# Patient Record
Sex: MALE | Race: ASIAN | NOT HISPANIC OR LATINO
[De-identification: names, ages, dates, MRNs, and addresses within clinical notes are randomized per-mention and may not be internally consistent; named-entity substitution may affect disease eponyms.]

---

## 2017-01-03 ENCOUNTER — RX RENEWAL (OUTPATIENT)
Age: 77
End: 2017-01-03

## 2017-01-26 ENCOUNTER — RX RENEWAL (OUTPATIENT)
Age: 77
End: 2017-01-26

## 2017-04-04 ENCOUNTER — MEDICATION RENEWAL (OUTPATIENT)
Age: 77
End: 2017-04-04

## 2017-04-05 ENCOUNTER — RX RENEWAL (OUTPATIENT)
Age: 77
End: 2017-04-05

## 2017-04-06 ENCOUNTER — MEDICATION RENEWAL (OUTPATIENT)
Age: 77
End: 2017-04-06

## 2017-05-11 ENCOUNTER — RX RENEWAL (OUTPATIENT)
Age: 77
End: 2017-05-11

## 2017-06-28 ENCOUNTER — RX RENEWAL (OUTPATIENT)
Age: 77
End: 2017-06-28

## 2017-06-29 ENCOUNTER — MEDICATION RENEWAL (OUTPATIENT)
Age: 77
End: 2017-06-29

## 2017-07-20 ENCOUNTER — NON-APPOINTMENT (OUTPATIENT)
Age: 77
End: 2017-07-20

## 2017-07-20 ENCOUNTER — APPOINTMENT (OUTPATIENT)
Dept: CARDIOLOGY | Facility: CLINIC | Age: 77
End: 2017-07-20

## 2017-07-20 VITALS
SYSTOLIC BLOOD PRESSURE: 124 MMHG | HEART RATE: 62 BPM | OXYGEN SATURATION: 96 % | HEIGHT: 68 IN | BODY MASS INDEX: 26.52 KG/M2 | WEIGHT: 175 LBS | DIASTOLIC BLOOD PRESSURE: 70 MMHG

## 2017-07-25 ENCOUNTER — MEDICATION RENEWAL (OUTPATIENT)
Age: 77
End: 2017-07-25

## 2017-08-03 ENCOUNTER — MEDICATION RENEWAL (OUTPATIENT)
Age: 77
End: 2017-08-03

## 2017-08-04 ENCOUNTER — RX RENEWAL (OUTPATIENT)
Age: 77
End: 2017-08-04

## 2017-09-25 ENCOUNTER — RX RENEWAL (OUTPATIENT)
Age: 77
End: 2017-09-25

## 2017-10-02 ENCOUNTER — RX RENEWAL (OUTPATIENT)
Age: 77
End: 2017-10-02

## 2017-10-09 ENCOUNTER — RX RENEWAL (OUTPATIENT)
Age: 77
End: 2017-10-09

## 2017-10-10 ENCOUNTER — MEDICATION RENEWAL (OUTPATIENT)
Age: 77
End: 2017-10-10

## 2017-10-20 ENCOUNTER — RX RENEWAL (OUTPATIENT)
Age: 77
End: 2017-10-20

## 2017-10-23 ENCOUNTER — MEDICATION RENEWAL (OUTPATIENT)
Age: 77
End: 2017-10-23

## 2017-12-11 ENCOUNTER — RX RENEWAL (OUTPATIENT)
Age: 77
End: 2017-12-11

## 2017-12-28 ENCOUNTER — RX RENEWAL (OUTPATIENT)
Age: 77
End: 2017-12-28

## 2018-01-02 ENCOUNTER — RX RENEWAL (OUTPATIENT)
Age: 78
End: 2018-01-02

## 2018-01-15 ENCOUNTER — MEDICATION RENEWAL (OUTPATIENT)
Age: 78
End: 2018-01-15

## 2018-01-15 ENCOUNTER — RX RENEWAL (OUTPATIENT)
Age: 78
End: 2018-01-15

## 2018-03-12 ENCOUNTER — RX RENEWAL (OUTPATIENT)
Age: 78
End: 2018-03-12

## 2018-03-15 ENCOUNTER — RX RENEWAL (OUTPATIENT)
Age: 78
End: 2018-03-15

## 2018-03-15 ENCOUNTER — MEDICATION RENEWAL (OUTPATIENT)
Age: 78
End: 2018-03-15

## 2018-04-02 ENCOUNTER — RX RENEWAL (OUTPATIENT)
Age: 78
End: 2018-04-02

## 2018-04-03 ENCOUNTER — MEDICATION RENEWAL (OUTPATIENT)
Age: 78
End: 2018-04-03

## 2018-04-03 ENCOUNTER — RX RENEWAL (OUTPATIENT)
Age: 78
End: 2018-04-03

## 2018-04-04 ENCOUNTER — MEDICATION RENEWAL (OUTPATIENT)
Age: 78
End: 2018-04-04

## 2018-04-04 ENCOUNTER — RX RENEWAL (OUTPATIENT)
Age: 78
End: 2018-04-04

## 2018-04-05 ENCOUNTER — NON-APPOINTMENT (OUTPATIENT)
Age: 78
End: 2018-04-05

## 2018-04-05 ENCOUNTER — APPOINTMENT (OUTPATIENT)
Dept: CARDIOLOGY | Facility: CLINIC | Age: 78
End: 2018-04-05
Payer: MEDICARE

## 2018-04-05 VITALS — HEIGHT: 68 IN | BODY MASS INDEX: 26.83 KG/M2 | WEIGHT: 177 LBS

## 2018-04-05 VITALS — SYSTOLIC BLOOD PRESSURE: 138 MMHG | OXYGEN SATURATION: 96 % | DIASTOLIC BLOOD PRESSURE: 76 MMHG | HEART RATE: 66 BPM

## 2018-04-05 PROCEDURE — 99214 OFFICE O/P EST MOD 30 MIN: CPT | Mod: 25

## 2018-04-05 PROCEDURE — 93000 ELECTROCARDIOGRAM COMPLETE: CPT

## 2018-04-05 RX ORDER — SIMVASTATIN 10 MG/1
10 TABLET, FILM COATED ORAL
Qty: 90 | Refills: 0 | Status: DISCONTINUED | COMMUNITY
End: 2018-04-05

## 2018-04-09 ENCOUNTER — RX RENEWAL (OUTPATIENT)
Age: 78
End: 2018-04-09

## 2018-05-01 ENCOUNTER — RX RENEWAL (OUTPATIENT)
Age: 78
End: 2018-05-01

## 2018-05-25 ENCOUNTER — APPOINTMENT (OUTPATIENT)
Dept: CARDIOLOGY | Facility: CLINIC | Age: 78
End: 2018-05-25

## 2018-06-07 ENCOUNTER — RX RENEWAL (OUTPATIENT)
Age: 78
End: 2018-06-07

## 2018-06-11 ENCOUNTER — MEDICATION RENEWAL (OUTPATIENT)
Age: 78
End: 2018-06-11

## 2018-06-11 ENCOUNTER — RX RENEWAL (OUTPATIENT)
Age: 78
End: 2018-06-11

## 2018-06-29 ENCOUNTER — RX RENEWAL (OUTPATIENT)
Age: 78
End: 2018-06-29

## 2018-08-02 ENCOUNTER — APPOINTMENT (OUTPATIENT)
Dept: CARDIOLOGY | Facility: CLINIC | Age: 78
End: 2018-08-02

## 2018-10-01 ENCOUNTER — RX RENEWAL (OUTPATIENT)
Age: 78
End: 2018-10-01

## 2018-10-01 ENCOUNTER — MEDICATION RENEWAL (OUTPATIENT)
Age: 78
End: 2018-10-01

## 2018-10-11 ENCOUNTER — APPOINTMENT (OUTPATIENT)
Dept: CARDIOLOGY | Facility: CLINIC | Age: 78
End: 2018-10-11
Payer: MEDICARE

## 2018-10-11 ENCOUNTER — NON-APPOINTMENT (OUTPATIENT)
Age: 78
End: 2018-10-11

## 2018-10-11 VITALS — WEIGHT: 176 LBS | HEIGHT: 68 IN | BODY MASS INDEX: 26.67 KG/M2

## 2018-10-11 VITALS — SYSTOLIC BLOOD PRESSURE: 113 MMHG | DIASTOLIC BLOOD PRESSURE: 62 MMHG | OXYGEN SATURATION: 98 % | HEART RATE: 62 BPM

## 2018-10-11 DIAGNOSIS — K62.5 HEMORRHAGE OF ANUS AND RECTUM: ICD-10-CM

## 2018-10-11 PROCEDURE — 93000 ELECTROCARDIOGRAM COMPLETE: CPT

## 2018-10-11 PROCEDURE — 90686 IIV4 VACC NO PRSV 0.5 ML IM: CPT

## 2018-10-11 PROCEDURE — G0008: CPT

## 2018-10-11 PROCEDURE — 99214 OFFICE O/P EST MOD 30 MIN: CPT | Mod: 25

## 2018-10-16 ENCOUNTER — MED ADMIN CHARGE (OUTPATIENT)
Age: 78
End: 2018-10-16

## 2018-10-30 ENCOUNTER — MEDICATION RENEWAL (OUTPATIENT)
Age: 78
End: 2018-10-30

## 2018-10-30 ENCOUNTER — RX RENEWAL (OUTPATIENT)
Age: 78
End: 2018-10-30

## 2018-11-16 ENCOUNTER — APPOINTMENT (OUTPATIENT)
Dept: CARDIOLOGY | Facility: CLINIC | Age: 78
End: 2018-11-16
Payer: MEDICARE

## 2018-11-16 PROCEDURE — 93880 EXTRACRANIAL BILAT STUDY: CPT

## 2018-11-27 ENCOUNTER — RX RENEWAL (OUTPATIENT)
Age: 78
End: 2018-11-27

## 2019-02-24 ENCOUNTER — RX RENEWAL (OUTPATIENT)
Age: 79
End: 2019-02-24

## 2019-02-28 ENCOUNTER — NON-APPOINTMENT (OUTPATIENT)
Age: 79
End: 2019-02-28

## 2019-02-28 ENCOUNTER — APPOINTMENT (OUTPATIENT)
Dept: CARDIOLOGY | Facility: CLINIC | Age: 79
End: 2019-02-28
Payer: MEDICARE

## 2019-02-28 VITALS
OXYGEN SATURATION: 93 % | SYSTOLIC BLOOD PRESSURE: 132 MMHG | HEART RATE: 61 BPM | DIASTOLIC BLOOD PRESSURE: 74 MMHG | BODY MASS INDEX: 26.37 KG/M2 | HEIGHT: 68 IN | WEIGHT: 174 LBS

## 2019-02-28 PROCEDURE — 93000 ELECTROCARDIOGRAM COMPLETE: CPT

## 2019-02-28 PROCEDURE — 99214 OFFICE O/P EST MOD 30 MIN: CPT | Mod: 25

## 2019-02-28 RX ORDER — SITAGLIPTIN AND METFORMIN HYDROCHLORIDE 50; 500 MG/1; MG/1
50-500 TABLET, FILM COATED ORAL TWICE DAILY
Qty: 180 | Refills: 1 | Status: DISCONTINUED | COMMUNITY
Start: 2018-10-19 | End: 2019-02-28

## 2019-02-28 NOTE — ASSESSMENT
[FreeTextEntry1] : Patient with above hx \par \par \par CAD  CABG Prior PCI  : stable , continue medication , continue his present medication . will obtain carotid doppler to rule out carotid stenosis .\par \par DM:elevated , uncontrolled , advised the patient to change metformin to 1000 mg po daily  add januvia 50 mg po BID  , glipizide to 5 mg twice   , home monitoring , will obtain blood work , recommended opthalmology , podiatric evaluation . \par \par HLD : continue low cholesterol diet ,change  medication due to side effect , crestor 5 mg po daily , \par \par HTN:controlled , continue metoprolol succinate , \par \par BPH , advised the patient to be evaluated by urologist ,\par

## 2019-02-28 NOTE — REVIEW OF SYSTEMS
[Fever] : no fever [Blurry Vision] : no blurred vision [Earache] : no earache [Shortness Of Breath] : no shortness of breath [Palpitations] : no palpitations [Cough] : no cough [Abdominal Pain] : no abdominal pain [Heartburn] : no heartburn [Urinary Frequency] : no change in urinary frequency [Joint Pain] : no joint pain [Muscle Cramps] : no muscle cramps [Skin: A Rash] : no rash: [Skin Lesions] : no skin lesions [Dizziness] : no dizziness [Confusion] : no confusion was observed [Excessive Thirst] : no polydipsia [Easy Bleeding] : no tendency for easy bleeding

## 2019-02-28 NOTE — DISCUSSION/SUMMARY
[Coronary Artery Disease] : coronary artery disease [Hyperlipidemia] : hyperlipidemia [Diet Modification] : diet modification [Exercise] : exercise [Hypertension] : hypertension [Stable] : stable [None] : none [Exercise Regimen] : an exercise regimen [Sodium Restriction] : sodium restriction

## 2019-02-28 NOTE — REASON FOR VISIT
[Follow-Up - Clinic] : a clinic follow-up of [Coronary Artery Disease] : coronary artery disease [Hyperlipidemia] : hyperlipidemia [Hypertension] : hypertension [Medication Management] : Medication management [FreeTextEntry1] : BPH ,DM

## 2019-02-28 NOTE — HISTORY OF PRESENT ILLNESS
[FreeTextEntry1] : Patient  with hx of DM,HTN,HLD CABG 3 vessel CABG who came for follow up  says he had rectal bleeding  bright red  blood last month ,which was heavy one time  ( had hemorrhoid in the past with bleed ) seen by  GI dr KIM , was recommended  to have colonoscopy   however he did not have \par \par  Patient denies any complains ,denies any exertional chest pain or shortness of breath or dizziness , Patient does exercise regularly without any difficult y\par \par Patient did have colonoscopy 5 years ago ,\par \par Patient had blood work showed elevated fasting sugar 154, hemoglobin A1c 8.6    patient is not taking  janumet \par \par patients home BS  159  patient is taking low dose statin as he claims he feels muscle ache  with simvastatin ,

## 2019-02-28 NOTE — PHYSICAL EXAM
[General Appearance - Well Developed] : well developed [Normal Conjunctiva] : the conjunctiva exhibited no abnormalities [Normal Oral Mucosa] : normal oral mucosa [Normal Jugular Venous A Waves Present] : normal jugular venous A waves present [] : no respiratory distress [Respiration, Rhythm And Depth] : normal respiratory rhythm and effort [Exaggerated Use Of Accessory Muscles For Inspiration] : no accessory muscle use [Auscultation Breath Sounds / Voice Sounds] : lungs were clear to auscultation bilaterally [Chest Palpation] : palpation of the chest revealed no abnormalities [Lungs Percussion] : the lungs were normal to percussion [Heart Rate And Rhythm] : heart rate and rhythm were normal [Heart Sounds] : normal S1 and S2 [Arterial Pulses Normal] : the arterial pulses were normal [Edema] : no peripheral edema present [Veins - Varicosity Changes] : no varicosital changes were noted in the lower extremities [Systolic grade ___/6] : A grade [unfilled]/6 systolic murmur was heard. [Bowel Sounds] : normal bowel sounds [Abdomen Soft] : soft [Abdomen Tenderness] : non-tender [Abnormal Walk] : normal gait [Nail Clubbing] : no clubbing of the fingernails [Skin Color & Pigmentation] : normal skin color and pigmentation [Skin Turgor] : normal skin turgor [Oriented To Time, Place, And Person] : oriented to person, place, and time

## 2019-03-12 ENCOUNTER — MEDICATION RENEWAL (OUTPATIENT)
Age: 79
End: 2019-03-12

## 2019-03-18 ENCOUNTER — RX RENEWAL (OUTPATIENT)
Age: 79
End: 2019-03-18

## 2019-03-20 ENCOUNTER — MEDICATION RENEWAL (OUTPATIENT)
Age: 79
End: 2019-03-20

## 2019-05-16 ENCOUNTER — RX RENEWAL (OUTPATIENT)
Age: 79
End: 2019-05-16

## 2019-06-17 ENCOUNTER — MEDICATION RENEWAL (OUTPATIENT)
Age: 79
End: 2019-06-17

## 2019-06-17 ENCOUNTER — RX RENEWAL (OUTPATIENT)
Age: 79
End: 2019-06-17

## 2019-06-20 ENCOUNTER — NON-APPOINTMENT (OUTPATIENT)
Age: 79
End: 2019-06-20

## 2019-06-20 ENCOUNTER — APPOINTMENT (OUTPATIENT)
Dept: CARDIOLOGY | Facility: CLINIC | Age: 79
End: 2019-06-20
Payer: MEDICARE

## 2019-06-20 VITALS
HEIGHT: 68 IN | DIASTOLIC BLOOD PRESSURE: 72 MMHG | SYSTOLIC BLOOD PRESSURE: 122 MMHG | OXYGEN SATURATION: 96 % | WEIGHT: 168 LBS | HEART RATE: 56 BPM | BODY MASS INDEX: 25.46 KG/M2

## 2019-06-20 PROCEDURE — 93000 ELECTROCARDIOGRAM COMPLETE: CPT

## 2019-06-20 PROCEDURE — 99214 OFFICE O/P EST MOD 30 MIN: CPT | Mod: 25

## 2019-06-20 NOTE — REVIEW OF SYSTEMS
[Fever] : no fever [Earache] : no earache [Blurry Vision] : no blurred vision [Shortness Of Breath] : no shortness of breath [Palpitations] : no palpitations [Cough] : no cough [Abdominal Pain] : no abdominal pain [Heartburn] : no heartburn [Urinary Frequency] : no change in urinary frequency [Joint Pain] : no joint pain [Muscle Cramps] : no muscle cramps [Skin: A Rash] : no rash: [Skin Lesions] : no skin lesions [Dizziness] : no dizziness [Confusion] : no confusion was observed [Excessive Thirst] : no polydipsia [Easy Bleeding] : no tendency for easy bleeding

## 2019-06-20 NOTE — PHYSICAL EXAM
[General Appearance - Well Developed] : well developed [Normal Conjunctiva] : the conjunctiva exhibited no abnormalities [Normal Jugular Venous A Waves Present] : normal jugular venous A waves present [Normal Oral Mucosa] : normal oral mucosa [Respiration, Rhythm And Depth] : normal respiratory rhythm and effort [] : no respiratory distress [Exaggerated Use Of Accessory Muscles For Inspiration] : no accessory muscle use [Auscultation Breath Sounds / Voice Sounds] : lungs were clear to auscultation bilaterally [Chest Palpation] : palpation of the chest revealed no abnormalities [Lungs Percussion] : the lungs were normal to percussion [Heart Rate And Rhythm] : heart rate and rhythm were normal [Arterial Pulses Normal] : the arterial pulses were normal [Edema] : no peripheral edema present [Heart Sounds] : normal S1 and S2 [Veins - Varicosity Changes] : no varicosital changes were noted in the lower extremities [Systolic grade ___/6] : A grade [unfilled]/6 systolic murmur was heard. [Bowel Sounds] : normal bowel sounds [Abdomen Soft] : soft [Abdomen Tenderness] : non-tender [Abnormal Walk] : normal gait [Skin Color & Pigmentation] : normal skin color and pigmentation [Nail Clubbing] : no clubbing of the fingernails [Oriented To Time, Place, And Person] : oriented to person, place, and time [Skin Turgor] : normal skin turgor

## 2019-06-20 NOTE — REASON FOR VISIT
[Follow-Up - Clinic] : a clinic follow-up of [Hyperlipidemia] : hyperlipidemia [Coronary Artery Disease] : coronary artery disease [Hypertension] : hypertension [Medication Management] : Medication management [FreeTextEntry1] : BPH ,DM

## 2019-06-20 NOTE — HISTORY OF PRESENT ILLNESS
[FreeTextEntry1] : Patient  with hx of DM,HTN,HLD CABG 3 vessel CABG who came for follow up says he is doing well , wants viagra  \par \par says he had rectal bleeding  bright red  blood last month ,which was heavy one time  ( had hemorrhoid in the past with bleed ) seen by  GI dr KIM , was recommended  to have colonoscopy   however he did not see \par \par  Patient denies any complains ,denies any exertional chest pain or shortness of breath or dizziness , Patient does exercise regularly without any difficulty\par \par Patient did have colonoscopy 5 years ago ,\par \par Patient had blood work showed elevated fasting sugar 127, hemoglobin A1c 7.8    patient is not taking  janumet \par \par patient is taking low dose statin as he claims he feels muscle ache  with simvastatin ,

## 2019-07-02 ENCOUNTER — RX RENEWAL (OUTPATIENT)
Age: 79
End: 2019-07-02

## 2019-07-02 ENCOUNTER — MEDICATION RENEWAL (OUTPATIENT)
Age: 79
End: 2019-07-02

## 2019-08-02 ENCOUNTER — RX RENEWAL (OUTPATIENT)
Age: 79
End: 2019-08-02

## 2019-08-02 ENCOUNTER — MEDICATION RENEWAL (OUTPATIENT)
Age: 79
End: 2019-08-02

## 2019-10-13 ENCOUNTER — RX RENEWAL (OUTPATIENT)
Age: 79
End: 2019-10-13

## 2019-10-30 ENCOUNTER — RX RENEWAL (OUTPATIENT)
Age: 79
End: 2019-10-30

## 2019-10-30 ENCOUNTER — MEDICATION RENEWAL (OUTPATIENT)
Age: 79
End: 2019-10-30

## 2019-11-08 ENCOUNTER — APPOINTMENT (OUTPATIENT)
Dept: CARDIOLOGY | Facility: CLINIC | Age: 79
End: 2019-11-08
Payer: MEDICARE

## 2019-11-08 ENCOUNTER — NON-APPOINTMENT (OUTPATIENT)
Age: 79
End: 2019-11-08

## 2019-11-08 VITALS — HEART RATE: 55 BPM | OXYGEN SATURATION: 99 % | DIASTOLIC BLOOD PRESSURE: 78 MMHG | SYSTOLIC BLOOD PRESSURE: 171 MMHG

## 2019-11-08 VITALS — DIASTOLIC BLOOD PRESSURE: 70 MMHG | SYSTOLIC BLOOD PRESSURE: 140 MMHG

## 2019-11-08 VITALS — HEIGHT: 68 IN | BODY MASS INDEX: 25.01 KG/M2 | WEIGHT: 165 LBS

## 2019-11-08 PROCEDURE — 93000 ELECTROCARDIOGRAM COMPLETE: CPT

## 2019-11-08 PROCEDURE — 99214 OFFICE O/P EST MOD 30 MIN: CPT

## 2019-11-08 NOTE — DISCUSSION/SUMMARY
[Coronary Artery Disease] : coronary artery disease [Hyperlipidemia] : hyperlipidemia [Exercise] : exercise [Diet Modification] : diet modification [Hypertension] : hypertension [Stable] : stable [None] : none [Exercise Regimen] : an exercise regimen [Sodium Restriction] : sodium restriction

## 2019-11-08 NOTE — PHYSICAL EXAM
[Normal Conjunctiva] : the conjunctiva exhibited no abnormalities [General Appearance - Well Developed] : well developed [Normal Jugular Venous A Waves Present] : normal jugular venous A waves present [Normal Oral Mucosa] : normal oral mucosa [FreeTextEntry1] : right carotid bruit  [Respiration, Rhythm And Depth] : normal respiratory rhythm and effort [] : no respiratory distress [Exaggerated Use Of Accessory Muscles For Inspiration] : no accessory muscle use [Auscultation Breath Sounds / Voice Sounds] : lungs were clear to auscultation bilaterally [Chest Palpation] : palpation of the chest revealed no abnormalities [Lungs Percussion] : the lungs were normal to percussion [Heart Rate And Rhythm] : heart rate and rhythm were normal [Heart Sounds] : normal S1 and S2 [Arterial Pulses Normal] : the arterial pulses were normal [Veins - Varicosity Changes] : no varicosital changes were noted in the lower extremities [Edema] : no peripheral edema present [Bowel Sounds] : normal bowel sounds [Abdomen Soft] : soft [Systolic grade ___/6] : A grade [unfilled]/6 systolic murmur was heard. [Abnormal Walk] : normal gait [Abdomen Tenderness] : non-tender [Skin Color & Pigmentation] : normal skin color and pigmentation [Skin Turgor] : normal skin turgor [Nail Clubbing] : no clubbing of the fingernails [Oriented To Time, Place, And Person] : oriented to person, place, and time

## 2019-11-08 NOTE — REVIEW OF SYSTEMS
[Fever] : no fever [Earache] : no earache [Blurry Vision] : no blurred vision [Palpitations] : no palpitations [Shortness Of Breath] : no shortness of breath [Heartburn] : no heartburn [Cough] : no cough [Abdominal Pain] : no abdominal pain [Joint Pain] : no joint pain [Urinary Frequency] : no change in urinary frequency [Muscle Cramps] : no muscle cramps [Skin: A Rash] : no rash: [Skin Lesions] : no skin lesions [Confusion] : no confusion was observed [Dizziness] : no dizziness [Easy Bleeding] : no tendency for easy bleeding [Excessive Thirst] : no polydipsia

## 2019-11-08 NOTE — ASSESSMENT
[FreeTextEntry1] : Patient with above hx \par \par \par CAD  CABG Prior PCI  : stable , continue medication , continue his present medication . \par \par DM:elevated , uncontrolled , advised the patient to change metformin to 1000 mg po daily ( add januvia 50 mg po BID   insurance )  , glipizide to 5 mg twice   , home monitoring , will obtain blood work , recommended opthalmology , podiatric evaluation . ( patient does not want to follow ) \par \par HLD : continue low cholesterol diet ,change  medication due to side effect , crestor 5 mg po daily , \par \par HTN:controlled , continue metoprolol succinate ,  will give viagra 50 mg  once a week as needed \par \par ESM   aortic sclerosis follow up echo \par \par BPH , advised the patient to be evaluated by urologist ,\par \par Carotid bruits  moderate right carotid disease continue to monitor \par

## 2019-11-08 NOTE — HISTORY OF PRESENT ILLNESS
[FreeTextEntry1] : Patient  with hx of DM,HTN,HLD CABG 3 vessel CABG who came for follow up says he is doing well , \par patient is not taking jauvia  , patient blood sugar are elevated , ordered medication still he did not receive \par patient blood pressure is mild elevated  blood pressure \par \par \par says he had rectal bleeding  bright red  blood last month ,which was heavy one time  ( had hemorrhoid in the past with bleed ) seen by  GI dr KIM , was recommended  to have colonoscopy   however he did not see  which he does not want to have colonoscopy \par \par  Patient denies any complains ,denies any exertional chest pain or shortness of breath or dizziness , Patient does exercise regularly without any difficulty\par \par Patient did have colonoscopy 5 years ago ,  patient did not see urologist despite several recommendation \par \par Patient had blood work showed elevated fasting sugar 127, hemoglobin A1c 7.8    patient is not taking  janumet \par \par patient is taking low dose statin as he claims he feels muscle ache  with simvastatin ,

## 2019-11-08 NOTE — REASON FOR VISIT
[Follow-Up - Clinic] : a clinic follow-up of [Coronary Artery Disease] : coronary artery disease [Medication Management] : Medication management [Hypertension] : hypertension [Hyperlipidemia] : hyperlipidemia [FreeTextEntry1] : BPH ,DM

## 2019-11-15 RX ORDER — SITAGLIPTIN 50 MG/1
50 TABLET, FILM COATED ORAL
Qty: 180 | Refills: 0 | Status: DISCONTINUED | COMMUNITY
Start: 2019-02-28 | End: 2019-11-15

## 2019-12-02 ENCOUNTER — RX RENEWAL (OUTPATIENT)
Age: 79
End: 2019-12-02

## 2019-12-30 ENCOUNTER — RX RENEWAL (OUTPATIENT)
Age: 79
End: 2019-12-30

## 2019-12-30 ENCOUNTER — MEDICATION RENEWAL (OUTPATIENT)
Age: 79
End: 2019-12-30

## 2019-12-31 ENCOUNTER — MEDICATION RENEWAL (OUTPATIENT)
Age: 79
End: 2019-12-31

## 2020-03-19 ENCOUNTER — APPOINTMENT (OUTPATIENT)
Dept: CARDIOLOGY | Facility: CLINIC | Age: 80
End: 2020-03-19

## 2020-05-18 ENCOUNTER — RX RENEWAL (OUTPATIENT)
Age: 80
End: 2020-05-18

## 2020-09-24 ENCOUNTER — NON-APPOINTMENT (OUTPATIENT)
Age: 80
End: 2020-09-24

## 2020-09-24 ENCOUNTER — APPOINTMENT (OUTPATIENT)
Dept: CARDIOLOGY | Facility: CLINIC | Age: 80
End: 2020-09-24
Payer: MEDICARE

## 2020-09-24 VITALS
BODY MASS INDEX: 25.01 KG/M2 | OXYGEN SATURATION: 99 % | HEIGHT: 68 IN | HEART RATE: 75 BPM | DIASTOLIC BLOOD PRESSURE: 69 MMHG | WEIGHT: 165 LBS | SYSTOLIC BLOOD PRESSURE: 150 MMHG

## 2020-09-24 VITALS — SYSTOLIC BLOOD PRESSURE: 120 MMHG | DIASTOLIC BLOOD PRESSURE: 70 MMHG

## 2020-09-24 PROCEDURE — 99214 OFFICE O/P EST MOD 30 MIN: CPT

## 2020-09-24 PROCEDURE — 93000 ELECTROCARDIOGRAM COMPLETE: CPT

## 2020-09-24 RX ORDER — RANITIDINE 150 MG/1
150 TABLET ORAL
Qty: 180 | Refills: 1 | Status: DISCONTINUED | COMMUNITY
Start: 2018-04-05 | End: 2020-09-24

## 2020-09-24 NOTE — PHYSICAL EXAM
[General Appearance - Well Developed] : well developed [Normal Conjunctiva] : the conjunctiva exhibited no abnormalities [Normal Oral Mucosa] : normal oral mucosa [Normal Jugular Venous A Waves Present] : normal jugular venous A waves present [FreeTextEntry1] : right carotid bruit  [] : no respiratory distress [Respiration, Rhythm And Depth] : normal respiratory rhythm and effort [Exaggerated Use Of Accessory Muscles For Inspiration] : no accessory muscle use [Auscultation Breath Sounds / Voice Sounds] : lungs were clear to auscultation bilaterally [Chest Palpation] : palpation of the chest revealed no abnormalities [Lungs Percussion] : the lungs were normal to percussion [Heart Rate And Rhythm] : heart rate and rhythm were normal [Heart Sounds] : normal S1 and S2 [Arterial Pulses Normal] : the arterial pulses were normal [Edema] : no peripheral edema present [Veins - Varicosity Changes] : no varicosital changes were noted in the lower extremities [Systolic grade ___/6] : A grade [unfilled]/6 systolic murmur was heard. [Bowel Sounds] : normal bowel sounds [Abdomen Soft] : soft [Abdomen Tenderness] : non-tender [Abnormal Walk] : normal gait [Nail Clubbing] : no clubbing of the fingernails [Skin Color & Pigmentation] : normal skin color and pigmentation [Skin Turgor] : normal skin turgor [Oriented To Time, Place, And Person] : oriented to person, place, and time

## 2020-09-24 NOTE — HISTORY OF PRESENT ILLNESS
[FreeTextEntry1] : Patient  with hx of DM,HTN,HLD CABG 3 vessel CABG who came for follow up says he is doing well , \par \par patient is not taking Tradjenta as it is expensive   , patient blood sugar are elevated  he is not taking as patient says it is expensive \par patient blood pressure is controlled . patient does heavy physical activity without any symptoms  patient does get acid reflex for which he takes PRN pepsid at home with relief \par \par \par says he had rectal bleeding  bright red  blood last month ,which was heavy one time  ( had hemorrhoid in the past with bleed ) seen by  GI dr KIM , was recommended  to have colonoscopy   however he did not see  which he does not want to have colonoscopy \par \par  Patient denies any complains ,denies any exertional chest pain or shortness of breath or dizziness , Patient does exercise regularly without any difficulty\par \par Patient did have colonoscopy 5 years ago ,  patient did not see urologist despite several recommendation \par \par patient is taking low dose statin as he claims he feels muscle ache  with simvastatin ,

## 2020-09-24 NOTE — ASSESSMENT
[FreeTextEntry1] : Patient with above hx \par \par \par CAD  CABG Prior PCI  : stable , continue medication , continue his present medication . \par \par DM:elevated , uncontrolled , advised the patient to change metformin to 1000 mg po BID  , glipizide to 5 mg twice   , home monitoring , will obtain blood work , recommended opthalmology , podiatric evaluation . ( patient does not want to follow ) \par \par HLD : continue low cholesterol diet ,change  medication due to side effect , crestor 5 mg po daily , \par \par HTN:controlled , continue metoprolol succinate ,  will give viagra 50 mg  once a week as needed \par \par ESM   aortic sclerosis follow up echo \par \par BPH , advised the patient to be evaluated by urologist ,\par \par Carotid bruits  moderate right carotid disease continue to monitor \par

## 2020-10-26 ENCOUNTER — RX RENEWAL (OUTPATIENT)
Age: 80
End: 2020-10-26

## 2021-02-04 ENCOUNTER — APPOINTMENT (OUTPATIENT)
Dept: CARDIOLOGY | Facility: CLINIC | Age: 81
End: 2021-02-04
Payer: MEDICARE

## 2021-02-04 ENCOUNTER — NON-APPOINTMENT (OUTPATIENT)
Age: 81
End: 2021-02-04

## 2021-02-04 VITALS
HEIGHT: 68 IN | WEIGHT: 162 LBS | BODY MASS INDEX: 24.55 KG/M2 | DIASTOLIC BLOOD PRESSURE: 76 MMHG | SYSTOLIC BLOOD PRESSURE: 123 MMHG | HEART RATE: 75 BPM | OXYGEN SATURATION: 98 %

## 2021-02-04 PROCEDURE — 99072 ADDL SUPL MATRL&STAF TM PHE: CPT

## 2021-02-04 PROCEDURE — 93000 ELECTROCARDIOGRAM COMPLETE: CPT

## 2021-02-04 PROCEDURE — 99214 OFFICE O/P EST MOD 30 MIN: CPT

## 2021-02-04 NOTE — PHYSICAL EXAM
[General Appearance - Well Developed] : well developed [Normal Conjunctiva] : the conjunctiva exhibited no abnormalities [Normal Oral Mucosa] : normal oral mucosa [Normal Jugular Venous A Waves Present] : normal jugular venous A waves present [FreeTextEntry1] : right carotid bruit  [] : no respiratory distress [Respiration, Rhythm And Depth] : normal respiratory rhythm and effort [Exaggerated Use Of Accessory Muscles For Inspiration] : no accessory muscle use [Auscultation Breath Sounds / Voice Sounds] : lungs were clear to auscultation bilaterally [Chest Palpation] : palpation of the chest revealed no abnormalities [Lungs Percussion] : the lungs were normal to percussion [Heart Rate And Rhythm] : heart rate and rhythm were normal [Heart Sounds] : normal S1 and S2 [Arterial Pulses Normal] : the arterial pulses were normal [Edema] : no peripheral edema present [Veins - Varicosity Changes] : no varicosital changes were noted in the lower extremities [Systolic grade ___/6] : A grade [unfilled]/6 systolic murmur was heard. [Abdomen Soft] : soft [Bowel Sounds] : normal bowel sounds [Abdomen Tenderness] : non-tender [Abnormal Walk] : normal gait [Nail Clubbing] : no clubbing of the fingernails [Skin Color & Pigmentation] : normal skin color and pigmentation [Skin Turgor] : normal skin turgor [Oriented To Time, Place, And Person] : oriented to person, place, and time

## 2021-02-04 NOTE — HISTORY OF PRESENT ILLNESS
[FreeTextEntry1] : Patient  with hx of DM,HTN,HLD CABG 3 vessel CABG who came for follow up says he is doing well ,  denies any chest pain or shortness of breath or dizziness  \par \par patient is not taking Tradjenta as it is expensive   , patient blood sugar are elevated  he is not taking as patient says it is expensive   his HB a1c 6.8   controlled lipids \par \par patient blood pressure is controlled . patient does heavy physical activity without any symptoms  patient does get acid reflex for which he takes PRN pepsid at home with relief \par \par \par says he had rectal bleeding  bright red  blood last month ,which was heavy one time  ( had hemorrhoid in the past with bleed ) seen by  GI dr KIM , was recommended  to have colonoscopy   however he did not see  which he does not want to have colonoscopy \par \par Patient did have colonoscopy 5 years ago ,  patient did not see urologist despite several recommendation \par \par patient is taking low dose statin as he claims he feels muscle ache  with simvastatin ,

## 2021-02-04 NOTE — REASON FOR VISIT
[Follow-Up - Clinic] : a clinic follow-up of [Coronary Artery Disease] : coronary artery disease [Hyperlipidemia] : hyperlipidemia [Hypertension] : hypertension [Medication Management] : Medication management [FreeTextEntry1] : BPH ,DM 12-Mar-2019

## 2021-02-04 NOTE — ASSESSMENT
[FreeTextEntry1] : Patient with above hx \par \par \par CAD  CABG Prior PCI  : stable , continue medication , continue his present medication . \par \par DM: failrly controlled HB a1c 6.8 , advised the patient to change metformin to 1000 mg po BID  , glipizide to 5 mg twice   , home monitoring , will obtain blood work , recommended opthalmology , podiatric evaluation . ( patient does not want to follow ) \par \par HLD : continue low cholesterol diet ,change  medication due to side effect , crestor 5 mg po daily , \par \par HTN:controlled , continue metoprolol succinate ,  will give viagra 50 mg  once a week as needed \par \par ESM   aortic sclerosis follow up echo \par \par BPH , advised the patient to be evaluated by urologist ,\par \par Carotid bruits  moderate right carotid disease continue to monitor \par

## 2021-03-23 ENCOUNTER — RX RENEWAL (OUTPATIENT)
Age: 81
End: 2021-03-23

## 2021-04-06 NOTE — ASSESSMENT
[FreeTextEntry1] : Patient with above hx \par \par \par CAD  CABG Prior PCI  : stable , continue medication , continue his present medication . \par \par DM:elevated , uncontrolled , advised the patient to change metformin to 1000 mg po daily ( add januvia 50 mg po BID   insurance )  , glipizide to 5 mg twice   , home monitoring , will obtain blood work , recommended opthalmology , podiatric evaluation . \par \par HLD : continue low cholesterol diet ,change  medication due to side effect , crestor 5 mg po daily , \par \par HTN:controlled , continue metoprolol succinate ,  will give viagra 50 mg  once a week as needed \par \par BPH , advised the patient to be evaluated by urologist ,\par 
06-Apr-2021 20:58

## 2021-06-02 NOTE — PHYSICAL EXAM
[Normal S1, S2] : normal S1, S2 [No Murmur] : no murmur [No Gallop] : no gallop [Soft] : abdomen soft [Non Tender] : non-tender [Normal Bowel Sounds] : normal bowel sounds [Normal] : moves all extremities, no focal deficits, normal speech [Alert and Oriented] : alert and oriented [Normal memory] : normal memory

## 2021-06-02 NOTE — REASON FOR VISIT
[Symptom and Test Evaluation] : symptom and test evaluation [Hyperlipidemia] : hyperlipidemia [Hypertension] : hypertension [Coronary Artery Disease] : coronary artery disease [Other: ____] : [unfilled]

## 2021-06-03 ENCOUNTER — NON-APPOINTMENT (OUTPATIENT)
Age: 81
End: 2021-06-03

## 2021-06-03 ENCOUNTER — APPOINTMENT (OUTPATIENT)
Dept: CARDIOLOGY | Facility: CLINIC | Age: 81
End: 2021-06-03
Payer: MEDICARE

## 2021-06-03 VITALS
WEIGHT: 160 LBS | HEART RATE: 75 BPM | SYSTOLIC BLOOD PRESSURE: 124 MMHG | HEIGHT: 68 IN | DIASTOLIC BLOOD PRESSURE: 70 MMHG | BODY MASS INDEX: 24.25 KG/M2 | OXYGEN SATURATION: 97 %

## 2021-06-03 PROCEDURE — 99214 OFFICE O/P EST MOD 30 MIN: CPT

## 2021-06-03 PROCEDURE — 93000 ELECTROCARDIOGRAM COMPLETE: CPT

## 2021-06-03 NOTE — ASSESSMENT
[FreeTextEntry1] : Patient with above hx \par \par \par CAD  CABG Prior PCI  : stable , continue medication , continue his present medication . will obtain echo for follow up LVEF , chemical stress test \par \par DM:elevated , uncontrolled , advised the patient to change metformin to 1000 mg po daily    , glipizide to 5 mg twice   ,tradjenta  home monitoring , will obtain blood work , recommended opthalmology , podiatric evaluation . \par \par HLD : continue low cholesterol diet ,change  medication due to side effect , crestor 5 mg po daily , \par \par HTN:controlled , continue metoprolol succinate , \par \par BPH , advised the patient to be evaluated by urologist ,\par \par \par patient was advised to see urologist ,

## 2021-06-03 NOTE — CARDIOLOGY SUMMARY
[de-identified] :  sinus rhythm non specific T changes  [de-identified] : 2016 [de-identified] : 2016 [de-identified] : 11/525851  16-49% mild plaque Right  ,1-15% minimal plaque left CCA

## 2021-06-03 NOTE — HISTORY OF PRESENT ILLNESS
[FreeTextEntry1] : Patient  with hx of DM,HTN,HLD CABG 3 vessel CABG who came for follow up  says he is doing well , \par \par \par  Patient denies any complains ,denies any exertional chest pain or shortness of breath or dizziness , Patient does exercise regularly without any difficult y\par \par Patient did have colonoscopy 5 years ago ,\par \par Patient had blood work showed elevated fasting sugar 138 , hemoglobin A1c 6.8    patient is not taking  janumet \par \par normal lipid profile \par \par  patient is taking low dose statin as he claims he feels muscle ache  with simvastatin , \par \par \par Patient refused  to have screening colonoscopy  or urology evaluation

## 2021-06-24 ENCOUNTER — APPOINTMENT (OUTPATIENT)
Dept: CARDIOLOGY | Facility: CLINIC | Age: 81
End: 2021-06-24
Payer: MEDICARE

## 2021-06-24 PROCEDURE — 93306 TTE W/DOPPLER COMPLETE: CPT

## 2021-07-08 ENCOUNTER — APPOINTMENT (OUTPATIENT)
Dept: CARDIOLOGY | Facility: CLINIC | Age: 81
End: 2021-07-08

## 2021-09-28 RX ORDER — TAMSULOSIN HYDROCHLORIDE 0.4 MG/1
0.4 CAPSULE ORAL
Qty: 90 | Refills: 1 | Status: DISCONTINUED | COMMUNITY
Start: 2021-03-23 | End: 2021-09-28

## 2021-10-07 ENCOUNTER — NON-APPOINTMENT (OUTPATIENT)
Age: 81
End: 2021-10-07

## 2021-10-07 ENCOUNTER — APPOINTMENT (OUTPATIENT)
Dept: CARDIOLOGY | Facility: CLINIC | Age: 81
End: 2021-10-07
Payer: MEDICARE

## 2021-10-07 VITALS
OXYGEN SATURATION: 100 % | SYSTOLIC BLOOD PRESSURE: 131 MMHG | HEART RATE: 67 BPM | HEIGHT: 68 IN | DIASTOLIC BLOOD PRESSURE: 75 MMHG | BODY MASS INDEX: 23.95 KG/M2 | WEIGHT: 158 LBS

## 2021-10-07 PROCEDURE — 93000 ELECTROCARDIOGRAM COMPLETE: CPT

## 2021-10-07 PROCEDURE — 99214 OFFICE O/P EST MOD 30 MIN: CPT

## 2021-10-07 NOTE — CARDIOLOGY SUMMARY
[de-identified] :  10/7/21 sinus rhythm non specific T changes  [de-identified] : 2016 [de-identified] : 6/24/21  Normal EF 55-60%  paradoxical septal motion   Minimal trace  [de-identified] : 11/923643  16-49% mild plaque Right  ,1-15% minimal plaque left CCA

## 2021-10-07 NOTE — HISTORY OF PRESENT ILLNESS
[FreeTextEntry1] : Patient  with hx of DM,HTN,HLD CABG 3 vessel CABG who came for follow up says he has problem with taking crestor , does feel pain in right arm only  on day when he takes medication ,  at night , not related to activity , there for long time , patient does  hard work in back yard . patient did not have stress test  yet , \par \par ,denies any exertional chest pain or shortness of breath or dizziness , Patient does exercise regularly without any difficult y\par \par \par Patient had blood work showed elevated fasting sugar 138 , hemoglobin A1c 6.8    patient is not taking  janumet \par \par normal lipid profile \par \par patient is taking low dose statin as he claims he feels muscle ache  with simvastatin , \par \par

## 2021-10-07 NOTE — ASSESSMENT
[FreeTextEntry1] : Patient with above hx \par \par \par CAD  CABG Prior PCI  : stable , continue medication , continue his present medication . will obtain echo for follow up LVEF , chemical stress test \par \par right arm pain , \par \par DM:elevated , uncontrolled , advised the patient to change metformin to 1000 mg po daily    , glipizide to 5 mg twice   ,tradjenta  home monitoring , will obtain blood work , recommended opthalmology , podiatric evaluation . \par \par HLD : continue low cholesterol diet ,change  medication due to side effect , crestor 5 mg po daily , \par \par HTN:controlled , continue metoprolol succinate , \par \par BPH , advised the patient to be evaluated by urologist ,\par \par \par patient was advised to see urologist ,

## 2021-10-26 ENCOUNTER — RX RENEWAL (OUTPATIENT)
Age: 81
End: 2021-10-26

## 2021-11-11 ENCOUNTER — APPOINTMENT (OUTPATIENT)
Dept: CARDIOLOGY | Facility: CLINIC | Age: 81
End: 2021-11-11

## 2022-01-18 ENCOUNTER — NON-APPOINTMENT (OUTPATIENT)
Age: 82
End: 2022-01-18

## 2022-01-20 ENCOUNTER — APPOINTMENT (OUTPATIENT)
Dept: CARDIOLOGY | Facility: CLINIC | Age: 82
End: 2022-01-20
Payer: MEDICARE

## 2022-01-20 PROCEDURE — 93015 CV STRESS TEST SUPVJ I&R: CPT

## 2022-01-20 PROCEDURE — 78452 HT MUSCLE IMAGE SPECT MULT: CPT

## 2022-01-20 PROCEDURE — A9500: CPT

## 2022-02-03 ENCOUNTER — APPOINTMENT (OUTPATIENT)
Dept: CARDIOLOGY | Facility: CLINIC | Age: 82
End: 2022-02-03
Payer: MEDICARE

## 2022-02-03 ENCOUNTER — NON-APPOINTMENT (OUTPATIENT)
Age: 82
End: 2022-02-03

## 2022-02-03 VITALS — SYSTOLIC BLOOD PRESSURE: 128 MMHG | DIASTOLIC BLOOD PRESSURE: 70 MMHG

## 2022-02-03 VITALS
WEIGHT: 160 LBS | SYSTOLIC BLOOD PRESSURE: 128 MMHG | BODY MASS INDEX: 24.25 KG/M2 | DIASTOLIC BLOOD PRESSURE: 70 MMHG | HEIGHT: 68 IN | OXYGEN SATURATION: 99 % | HEART RATE: 66 BPM

## 2022-02-03 PROCEDURE — 93000 ELECTROCARDIOGRAM COMPLETE: CPT

## 2022-02-03 PROCEDURE — 99214 OFFICE O/P EST MOD 30 MIN: CPT

## 2022-02-03 NOTE — HISTORY OF PRESENT ILLNESS
[FreeTextEntry1] : Patient  with hx of DM,HTN,HLD CABG 3 vessel CABG who came for follow up after having recommended stress test , patient says he is doing fine ,  his stress test showed mild basal mid anterior wall ischemia , denies any exertional fatigue or chest discomfort , did shovel snow  yesterday without discomfort , \par \par Patient says his blood sugar is running high lately in 150s \par \par Patient does exercise regularly without any difficult y\par \par Patient had blood work showed elevated fasting sugar 138 , hemoglobin A1c 6.8    patient is not taking  janumet \par \par normal lipid profile \par \par patient is taking low dose crestor 5 mg po daily now , tolerating well , \par \par

## 2022-02-03 NOTE — CARDIOLOGY SUMMARY
[de-identified] :  10/7/21 sinus rhythm non specific T changes  [de-identified] : 1/20/22  medium size mild to moderate basal ,mid anterior wall ischemia  EF 52% on chemical stress test  [de-identified] : 6/24/21  Normal EF 55-60%  paradoxical septal motion   Minimal trace  [de-identified] : 11/809109  16-49% mild plaque Right  ,1-15% minimal plaque left CCA

## 2022-02-03 NOTE — ASSESSMENT
[FreeTextEntry1] : Patient with above hx \par \par \par CAD  CABG Prior PCI  :  abnormal nuclear stress test   showing mild basal  mid anterior wall ischemia , patient says he does not have any exertional chest pain , he does not want to have cardiac cath ,  continue his present medication .  advised the patient to seek medical attention if he develops  chest pain or shortness of breath \par \par \par DM:elevated , uncontrolled , advised the patient to change metformin to 1000 mg po bid  , glipizide to 5 mg twice   ,tradjenta  home monitoring , will obtain blood work , recommended opthalmology , podiatric evaluation . \par \par HLD : continue low cholesterol diet ,change  medication due to side effect , crestor 5 mg po daily , \par \par HTN:controlled , continue metoprolol succinate , \par \par BPH , advised the patient to be evaluated by urologist ,\par \par \par patient was advised to see urologist ,

## 2022-05-05 ENCOUNTER — APPOINTMENT (OUTPATIENT)
Dept: CARDIOLOGY | Facility: CLINIC | Age: 82
End: 2022-05-05
Payer: MEDICARE

## 2022-05-05 LAB
25(OH)D3 SERPL-MCNC: 50.8 NG/ML
ALBUMIN SERPL ELPH-MCNC: 4.3 G/DL
ALP BLD-CCNC: 62 U/L
ALT SERPL-CCNC: 17 U/L
ANION GAP SERPL CALC-SCNC: 13 MMOL/L
APPEARANCE: CLEAR
AST SERPL-CCNC: 17 U/L
BASOPHILS # BLD AUTO: 0.05 K/UL
BASOPHILS NFR BLD AUTO: 0.9 %
BILIRUB SERPL-MCNC: 0.5 MG/DL
BILIRUBIN URINE: NEGATIVE
BLOOD URINE: NEGATIVE
BUN SERPL-MCNC: 23 MG/DL
CALCIUM SERPL-MCNC: 9.2 MG/DL
CHLORIDE SERPL-SCNC: 104 MMOL/L
CHOLEST SERPL-MCNC: 135 MG/DL
CO2 SERPL-SCNC: 23 MMOL/L
COLOR: NORMAL
CREAT SERPL-MCNC: 1.44 MG/DL
EGFR: 49 ML/MIN/1.73M2
EOSINOPHIL # BLD AUTO: 0.18 K/UL
EOSINOPHIL NFR BLD AUTO: 3.4 %
ESTIMATED AVERAGE GLUCOSE: 192 MG/DL
GLUCOSE QUALITATIVE U: NEGATIVE
GLUCOSE SERPL-MCNC: 170 MG/DL
HBA1C MFR BLD HPLC: 8.3 %
HCT VFR BLD CALC: 40.9 %
HDLC SERPL-MCNC: 52 MG/DL
HGB BLD-MCNC: 11.9 G/DL
IMM GRANULOCYTES NFR BLD AUTO: 0.4 %
KETONES URINE: NEGATIVE
LDLC SERPL CALC-MCNC: 61 MG/DL
LEUKOCYTE ESTERASE URINE: NEGATIVE
LYMPHOCYTES # BLD AUTO: 1.52 K/UL
LYMPHOCYTES NFR BLD AUTO: 28.3 %
MAN DIFF?: NORMAL
MCHC RBC-ENTMCNC: 23.5 PG
MCHC RBC-ENTMCNC: 29.1 GM/DL
MCV RBC AUTO: 80.7 FL
MONOCYTES # BLD AUTO: 0.67 K/UL
MONOCYTES NFR BLD AUTO: 12.5 %
NEUTROPHILS # BLD AUTO: 2.93 K/UL
NEUTROPHILS NFR BLD AUTO: 54.5 %
NITRITE URINE: NEGATIVE
NONHDLC SERPL-MCNC: 84 MG/DL
PH URINE: 6
PLATELET # BLD AUTO: 224 K/UL
POTASSIUM SERPL-SCNC: 4.6 MMOL/L
PROT SERPL-MCNC: 6.3 G/DL
PROTEIN URINE: NORMAL
PSA FREE FLD-MCNC: 37 %
PSA FREE SERPL-MCNC: 0.57 NG/ML
PSA SERPL-MCNC: 1.54 NG/ML
RBC # BLD: 5.07 M/UL
RBC # FLD: 14.6 %
SODIUM SERPL-SCNC: 140 MMOL/L
SPECIFIC GRAVITY URINE: 1.02
TRIGL SERPL-MCNC: 111 MG/DL
TSH SERPL-ACNC: 3.77 UIU/ML
UROBILINOGEN URINE: NORMAL
WBC # FLD AUTO: 5.37 K/UL

## 2022-05-19 ENCOUNTER — APPOINTMENT (OUTPATIENT)
Dept: CARDIOLOGY | Facility: CLINIC | Age: 82
End: 2022-05-19
Payer: MEDICARE

## 2022-05-19 ENCOUNTER — NON-APPOINTMENT (OUTPATIENT)
Age: 82
End: 2022-05-19

## 2022-05-19 VITALS
WEIGHT: 161 LBS | BODY MASS INDEX: 24.4 KG/M2 | HEIGHT: 68 IN | OXYGEN SATURATION: 97 % | DIASTOLIC BLOOD PRESSURE: 73 MMHG | HEART RATE: 68 BPM | SYSTOLIC BLOOD PRESSURE: 149 MMHG

## 2022-05-19 VITALS — DIASTOLIC BLOOD PRESSURE: 70 MMHG | SYSTOLIC BLOOD PRESSURE: 110 MMHG

## 2022-05-19 PROCEDURE — 99214 OFFICE O/P EST MOD 30 MIN: CPT

## 2022-05-19 PROCEDURE — 93000 ELECTROCARDIOGRAM COMPLETE: CPT

## 2022-05-19 NOTE — CARDIOLOGY SUMMARY
[de-identified] : 5/19/22  sinus rhythm non specific T changes  [de-identified] : 1/20/22  medium size mild to moderate basal ,mid anterior wall ischemia  EF 52% on chemical stress test  [de-identified] : 6/24/21  Normal EF 55-60%  paradoxical septal motion   Minimal trace  [de-identified] : 11/819954  16-49% mild plaque Right  ,1-15% minimal plaque left CCA

## 2022-05-19 NOTE — HISTORY OF PRESENT ILLNESS
[FreeTextEntry1] : Patient  with hx of DM,HTN,HLD CABG 3 vessel CABG who came for follow up says he is doing ok , but gets shortness of breath when he does unusual activity , no chest pain or wheezing , \par \par Patient does exercise regularly without any difficult y  gamal fu , patient does not sleep well due to urination problem , \par \par Patient had blood work showed elevated fasting sugar 170 , hemoglobin A1c 8.3     patient is not taking  janumet \par \par normal lipid profile    hemoglobin 11.9  decreased from 14.2 \par \par patient is taking low dose crestor 5 mg po daily now , tolerating well ,  blood pressure is well controlled \par \par

## 2022-05-19 NOTE — ASSESSMENT
[FreeTextEntry1] : Patient with above hx \par \par STINSON on unusual activity , stable  , BP controlled \par \par \par CAD  CABG Prior PCI  :  abnormal nuclear stress test   showing mild basal  mid anterior wall ischemia , patient says he does not have any exertional chest pain , he does not want to have cardiac cath ,  continue his present medication .  advised the patient to seek medical attention if he develops  chest pain or shortness of breath \par \par \par DM:elevated , uncontrolled  not very compliant to recommendation , advised the patient to change metformin to 1000 mg po bid  , glipizide to 5 mg twice   ,tradjenta  home monitoring ,recommended opthalmology , podiatric evaluation . \par \par HLD : continue low cholesterol diet ,change  medication due to side effect , crestor 5 mg po daily , \par \par HTN:controlled , continue metoprolol succinate , \par \par BPH , advised the patient to be evaluated by urologist ,\par anemia : dropped , recommended to see GI \par \par \par patient was advised to see urologist ,  gastroenterology

## 2022-10-20 ENCOUNTER — NON-APPOINTMENT (OUTPATIENT)
Age: 82
End: 2022-10-20

## 2022-10-20 ENCOUNTER — APPOINTMENT (OUTPATIENT)
Dept: CARDIOLOGY | Facility: CLINIC | Age: 82
End: 2022-10-20

## 2022-10-20 VITALS
DIASTOLIC BLOOD PRESSURE: 70 MMHG | BODY MASS INDEX: 24.25 KG/M2 | SYSTOLIC BLOOD PRESSURE: 130 MMHG | WEIGHT: 160 LBS | HEIGHT: 68 IN

## 2022-10-20 VITALS
BODY MASS INDEX: 24.25 KG/M2 | OXYGEN SATURATION: 100 % | DIASTOLIC BLOOD PRESSURE: 70 MMHG | SYSTOLIC BLOOD PRESSURE: 130 MMHG | HEIGHT: 68 IN | WEIGHT: 160 LBS | HEART RATE: 73 BPM

## 2022-10-20 PROCEDURE — 93000 ELECTROCARDIOGRAM COMPLETE: CPT

## 2022-10-20 PROCEDURE — 99214 OFFICE O/P EST MOD 30 MIN: CPT

## 2022-10-20 RX ORDER — FINASTERIDE 5 MG/1
5 TABLET, FILM COATED ORAL
Qty: 90 | Refills: 1 | Status: DISCONTINUED | COMMUNITY
Start: 2021-06-28 | End: 2022-10-20

## 2022-10-20 NOTE — ASSESSMENT
[FreeTextEntry1] : Patient with above hx \par \par STINSON on unusual activity , stable  , BP controlled \par \par CAD  CABG Prior PCI  :  abnormal nuclear stress test   showing mild basal  mid anterior wall ischemia , patient says he does not have any exertional chest pain , he does not want to have cardiac cath ,  continue his present medication .  advised the patient to seek medical attention if he develops  chest pain or shortness of breath \par \par DM:elevated , uncontrolled  not very compliant to recommendation , advised the patient to change metformin to 1000 mg po bid  , glipizide to 5 mg twice   ,tradjenta  home monitoring ,recommended opthalmology , podiatric evaluation . \par \par HLD : continue low cholesterol diet ,change  medication due to side effect , crestor 5 mg po daily , \par \par HTN:controlled , continue metoprolol succinate , \par \par BPH , advised the patient to be evaluated by urologist ,\par \par \par anemia : dropped , recommended to see GI \par \par \par patient was advised to see urologist ,  gastroenterology

## 2022-10-20 NOTE — CARDIOLOGY SUMMARY
[de-identified] : 10/20/22   sinus rhythm non specific T changes  [de-identified] : 1/20/22  medium size mild to moderate basal ,mid anterior wall ischemia  EF 52% on chemical stress test  [de-identified] : 6/24/21  Normal EF 55-60%  paradoxical septal motion   Minimal trace  [de-identified] : 11/051819  16-49% mild plaque Right  ,1-15% minimal plaque left CCA

## 2022-10-20 NOTE — HISTORY OF PRESENT ILLNESS
[FreeTextEntry1] : Patient  with hx of DM,HTN,HLD CABG 3 vessel CABG who came for follow up says  he is doing well  ,\par \par Patient denies any chest pain or shortness of breath or dizziness \par \par Patient does exercise regularly without any difficulty, patient does not sleep well due to urination problem , \par \par Patient had blood work showed elevated fasting sugar 170 , hemoglobin A1c 8.3    \par normal lipid profile    hemoglobin 11.9  decreased from 14.2 \par \par patient is taking low dose crestor 5 mg po daily now , tolerating well  LDL 61 ,,  blood pressure is well controlled\par \par Patient did not see urology , GI , he does not want to see any one  \par \par

## 2022-10-20 NOTE — PHYSICAL EXAM
[Normal S1, S2] : normal S1, S2 [No Murmur] : no murmur [No Gallop] : no gallop [Soft] : abdomen soft [Non Tender] : non-tender [Normal Bowel Sounds] : normal bowel sounds [Normal] : moves all extremities, no focal deficits, normal speech [Alert and Oriented] : alert and oriented [Normal memory] : normal memory [No Edema] : no edema

## 2023-02-23 LAB
25(OH)D3 SERPL-MCNC: 53 NG/ML
ALBUMIN SERPL ELPH-MCNC: 4.2 G/DL
ALP BLD-CCNC: 56 U/L
ALT SERPL-CCNC: 16 U/L
ANION GAP SERPL CALC-SCNC: 12 MMOL/L
AST SERPL-CCNC: 14 U/L
BASOPHILS # BLD AUTO: 0.05 K/UL
BASOPHILS NFR BLD AUTO: 1 %
BILIRUB SERPL-MCNC: 0.4 MG/DL
BUN SERPL-MCNC: 22 MG/DL
CALCIUM SERPL-MCNC: 9.3 MG/DL
CHLORIDE SERPL-SCNC: 101 MMOL/L
CHOLEST SERPL-MCNC: 128 MG/DL
CO2 SERPL-SCNC: 25 MMOL/L
CREAT SERPL-MCNC: 1.43 MG/DL
EGFR: 50 ML/MIN/1.73M2
EOSINOPHIL # BLD AUTO: 0.17 K/UL
EOSINOPHIL NFR BLD AUTO: 3.3 %
ESTIMATED AVERAGE GLUCOSE: 166 MG/DL
FERRITIN SERPL-MCNC: 10 NG/ML
FOLATE SERPL-MCNC: >20 NG/ML
GLUCOSE SERPL-MCNC: 145 MG/DL
HBA1C MFR BLD HPLC: 7.4 %
HCT VFR BLD CALC: 35.9 %
HDLC SERPL-MCNC: 56 MG/DL
HGB BLD-MCNC: 10.4 G/DL
IMM GRANULOCYTES NFR BLD AUTO: 0.2 %
LDLC SERPL CALC-MCNC: 55 MG/DL
LYMPHOCYTES # BLD AUTO: 1.48 K/UL
LYMPHOCYTES NFR BLD AUTO: 28.4 %
MAN DIFF?: NORMAL
MCHC RBC-ENTMCNC: 21.1 PG
MCHC RBC-ENTMCNC: 29 GM/DL
MCV RBC AUTO: 73 FL
MONOCYTES # BLD AUTO: 0.65 K/UL
MONOCYTES NFR BLD AUTO: 12.5 %
NEUTROPHILS # BLD AUTO: 2.85 K/UL
NEUTROPHILS NFR BLD AUTO: 54.6 %
NONHDLC SERPL-MCNC: 72 MG/DL
PLATELET # BLD AUTO: 256 K/UL
POTASSIUM SERPL-SCNC: 4.8 MMOL/L
PROT SERPL-MCNC: 6.5 G/DL
PSA FREE FLD-MCNC: 41 %
PSA FREE SERPL-MCNC: 0.57 NG/ML
PSA SERPL-MCNC: 1.4 NG/ML
RBC # BLD: 4.92 M/UL
RBC # FLD: 17.2 %
SODIUM SERPL-SCNC: 138 MMOL/L
TRIGL SERPL-MCNC: 84 MG/DL
TSH SERPL-ACNC: 2.83 UIU/ML
VIT B12 SERPL-MCNC: 1578 PG/ML
WBC # FLD AUTO: 5.21 K/UL

## 2023-02-28 ENCOUNTER — APPOINTMENT (OUTPATIENT)
Dept: GASTROENTEROLOGY | Facility: CLINIC | Age: 83
End: 2023-02-28
Payer: MEDICARE

## 2023-02-28 ENCOUNTER — NON-APPOINTMENT (OUTPATIENT)
Age: 83
End: 2023-02-28

## 2023-02-28 ENCOUNTER — APPOINTMENT (OUTPATIENT)
Dept: INTERNAL MEDICINE | Facility: CLINIC | Age: 83
End: 2023-02-28
Payer: MEDICARE

## 2023-02-28 VITALS
HEART RATE: 66 BPM | HEIGHT: 68 IN | SYSTOLIC BLOOD PRESSURE: 110 MMHG | BODY MASS INDEX: 23.95 KG/M2 | TEMPERATURE: 98.2 F | DIASTOLIC BLOOD PRESSURE: 66 MMHG | RESPIRATION RATE: 14 BRPM | WEIGHT: 158 LBS | OXYGEN SATURATION: 97 %

## 2023-02-28 VITALS
DIASTOLIC BLOOD PRESSURE: 61 MMHG | HEIGHT: 68 IN | BODY MASS INDEX: 23.95 KG/M2 | SYSTOLIC BLOOD PRESSURE: 114 MMHG | WEIGHT: 158 LBS | OXYGEN SATURATION: 100 % | HEART RATE: 64 BPM

## 2023-02-28 DIAGNOSIS — K52.9 NONINFECTIVE GASTROENTERITIS AND COLITIS, UNSPECIFIED: ICD-10-CM

## 2023-02-28 DIAGNOSIS — Z00.00 ENCOUNTER FOR GENERAL ADULT MEDICAL EXAMINATION W/OUT ABNORMAL FINDINGS: ICD-10-CM

## 2023-02-28 DIAGNOSIS — H91.90 UNSPECIFIED HEARING LOSS, UNSPECIFIED EAR: ICD-10-CM

## 2023-02-28 DIAGNOSIS — Z87.19 PERSONAL HISTORY OF OTHER DISEASES OF THE DIGESTIVE SYSTEM: ICD-10-CM

## 2023-02-28 PROCEDURE — G0439: CPT

## 2023-02-28 PROCEDURE — 99203 OFFICE O/P NEW LOW 30 MIN: CPT

## 2023-02-28 RX ORDER — FINASTERIDE 5 MG/1
5 TABLET, FILM COATED ORAL
Qty: 90 | Refills: 1 | Status: DISCONTINUED | COMMUNITY
Start: 2021-07-01 | End: 2023-02-28

## 2023-02-28 RX ORDER — SILDENAFIL 50 MG/1
50 TABLET ORAL
Qty: 21 | Refills: 0 | Status: DISCONTINUED | COMMUNITY
Start: 2019-06-20 | End: 2023-02-28

## 2023-02-28 RX ORDER — LINAGLIPTIN 5 MG/1
5 TABLET, FILM COATED ORAL DAILY
Qty: 90 | Refills: 2 | Status: DISCONTINUED | COMMUNITY
Start: 2019-11-15 | End: 2023-02-28

## 2023-02-28 RX ORDER — CHLORHEXIDINE GLUCONATE 4 %
325 (65 FE) LIQUID (ML) TOPICAL 3 TIMES DAILY
Refills: 0 | Status: ACTIVE | COMMUNITY
Start: 2023-02-28

## 2023-02-28 NOTE — REVIEW OF SYSTEMS
[Abdominal Pain] : no abdominal pain [Vomiting] : no vomiting [Constipation] : no constipation [Diarrhea] : no diarrhea [Heartburn] : no heartburn [Melena (black stool)] : no melena [Bleeding] : no bleeding [Fecal Incontinence (soiling)] : no fecal incontinence [Bloating (gassiness)] : no bloating

## 2023-02-28 NOTE — ASSESSMENT
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  We will schedule patient for both an upper endoscopy and colonoscopy after he has obtained cardiology clearance from Dr. Palla and has come off Plavix for 5 days.  Patient and his wife advised to continue famotidine and the iron supplementation until  just prior to his upper endoscopy and colonoscopy.  We will try to obtain the colonoscopy reports and upper endoscopy report [facility] Dr. Kumar [facility] [place] [place].

## 2023-02-28 NOTE — HISTORY OF PRESENT ILLNESS
[FreeTextEntry1] : establish care, AWV  [de-identified] : 83 y/o male with  PMH CAD s/p CABG x 3 vessels, type 2 DM, HTN, HLD, BPH, ulcerative colitis diagnosed 10-15 years ago, hard of hearing, who presents today for AWV. Patient has been following with Dr. Palla (cardio) and recently had labs done that were consistent with iron deficiency anemia. His wife is a retired hematologist and started him on ferrous sulfate TID, as well as famotidine. He states he was diagnosed with ulcerative colitis on colonoscopy 10 years ago and had been on medication at that time but stopped taking it. He denies any melena or hematochezia. Denies chest pain, palpitations, or SOB. \par \par Pt feels well today and has no acute complaints.

## 2023-02-28 NOTE — HISTORY OF PRESENT ILLNESS
[de-identified] : Last endoscopy done about 10 to 15 years ago by Dr. Kumar at [facility] in [place], [place] results not known [FreeTextEntry1] : Patient's wife claims that the patient had 4 colonoscopies in the past.  The first 2 revealed small colon polyps of unknown pathology, and the third colonoscopy done 10 to 15 years ago revealed proctosigmoiditis.

## 2023-02-28 NOTE — PLAN
[FreeTextEntry1] : HCM: pt to schedule colonoscopy, has apptment with Dr. Kirkpatrick. Up to date with flu, covid,and PNA vaccines. Will get Shingles vaccine at pharmacy. Routine labs were checked by cardiology last week. Will get EKG with cardio next week. Will recheck labs in May \par \par Iron deficiency anemia: continue iron supplementation. Pt to get colonoscopy in the next few weeks. Continue ferrous sulfate. Recheck CBC in 1-2 months. ER precautions provided. \par \par HTN: BP well controlled. Continue metoprolol and losartan \par \par HLD: lipid profile checked in February. Recheck in May. Continue statin. \par \par CAD: s/p CABG. Continue Plavix. Follows with cardio \par \par Type 2 DM: Recheck A1c in May. Continue Metformin and glipizide. Pt to get eye exam. Will check urine microalbumin in May \par \par GERD: continue famotidine. \par \par BPH: PSA checked in February 2023. Continue Finasteride and Flomax. \par \par \par \par

## 2023-02-28 NOTE — HEALTH RISK ASSESSMENT
[No] : No [No falls in past year] : Patient reported no falls in the past year [0] : 2) Feeling down, depressed, or hopeless: Not at all (0) [PHQ-2 Negative - No further assessment needed] : PHQ-2 Negative - No further assessment needed [Patient reported colonoscopy was abnormal] : Patient reported colonoscopy was abnormal [With Significant Other] : lives with significant other [Retired] : retired [] :  [Feels Safe at Home] : Feels safe at home [Fully functional (bathing, dressing, toileting, transferring, walking, feeding)] : Fully functional (bathing, dressing, toileting, transferring, walking, feeding) [Fully functional (using the telephone, shopping, preparing meals, housekeeping, doing laundry, using] : Fully functional and needs no help or supervision to perform IADLs (using the telephone, shopping, preparing meals, housekeeping, doing laundry, using transportation, managing medications and managing finances) [Never] : Never [de-identified] : walks  [facility]] : 4 [[id number]] : 0 [Reports changes in hearing] : Reports no changes in hearing [Reports changes in vision] : Reports no changes in vision [Reports changes in dental health] : Reports no changes in dental health [ColonoscopyDate] : 01/13 [ColonoscopyComments] : rectoscolitis  [de-identified] : hard of hearing

## 2023-02-28 NOTE — PHYSICAL EXAM
[Normal Rate] : normal rate  [Regular Rhythm] : with a regular rhythm [Normal S1, S2] : normal S1 and S2 [No Edema] : there was no peripheral edema [Soft] : abdomen soft [Non Tender] : non-tender [Non-distended] : non-distended [Normal Bowel Sounds] : normal bowel sounds [Coordination Grossly Intact] : coordination grossly intact [No Focal Deficits] : no focal deficits [Normal Gait] : normal gait [Normal] : affect was normal and insight and judgment were intact [de-identified] : +murmur

## 2023-02-28 NOTE — CONSULT LETTER
[Dear  ___] : Dear  [unfilled], [Please see my note below.] : Please see my note below. [Consult Closing:] : Thank you very much for allowing me to participate in the care of this patient.  If you have any questions, please do not hesitate to contact me. [DrRomeo  ___] : Dr. DEGROOT [FreeTextEntry2] : Dr. Rosita Maher

## 2023-02-28 NOTE — PHYSICAL EXAM
[None] : no edema [Normal] : normal bowel sounds, non-tender, no masses, soft, no no hepato-splenomegaly [No Rectal Mass] : no rectal mass [Occult Blood] : positive occult blood [+2] : prostate: +2 [FreeTextEntry1] : Mild pallor of conjunctiva [de-identified] : Hard of hearing [de-identified] : Stool dark from taking iron supplements; no rectal mass palpated [de-identified] : Well-healed sternotomy scar

## 2023-02-28 NOTE — REASON FOR VISIT
[Initial Evaluation] : an initial evaluation [Spouse] : spouse [FreeTextEntry1] : 82-year-old male with new onset iron deficiency anemia

## 2023-03-02 ENCOUNTER — APPOINTMENT (OUTPATIENT)
Dept: CARDIOLOGY | Facility: CLINIC | Age: 83
End: 2023-03-02
Payer: MEDICARE

## 2023-03-16 ENCOUNTER — APPOINTMENT (OUTPATIENT)
Dept: CARDIOLOGY | Facility: CLINIC | Age: 83
End: 2023-03-16
Payer: MEDICARE

## 2023-03-16 ENCOUNTER — NON-APPOINTMENT (OUTPATIENT)
Age: 83
End: 2023-03-16

## 2023-03-16 VITALS
DIASTOLIC BLOOD PRESSURE: 72 MMHG | BODY MASS INDEX: 23.95 KG/M2 | TEMPERATURE: 97.7 F | SYSTOLIC BLOOD PRESSURE: 162 MMHG | HEART RATE: 64 BPM | OXYGEN SATURATION: 100 % | HEIGHT: 68 IN | WEIGHT: 158 LBS

## 2023-03-16 DIAGNOSIS — Z01.810 ENCOUNTER FOR PREPROCEDURAL CARDIOVASCULAR EXAMINATION: ICD-10-CM

## 2023-03-16 PROCEDURE — 93000 ELECTROCARDIOGRAM COMPLETE: CPT | Mod: NC

## 2023-03-16 PROCEDURE — 99214 OFFICE O/P EST MOD 30 MIN: CPT

## 2023-03-16 NOTE — CARDIOLOGY SUMMARY
[de-identified] : 3/16/23   sinus rhythm non specific T changes  [de-identified] : 1/20/22  medium size mild to moderate basal ,mid anterior wall ischemia  EF 52% on chemical stress test  [de-identified] : 6/24/21  Normal EF 55-60%  paradoxical septal motion   Minimal trace  [de-identified] : 11/743058  16-49% mild plaque Right  ,1-15% minimal plaque left CCA

## 2023-03-16 NOTE — ASSESSMENT
[FreeTextEntry1] : Patient with above hx \par \par STINSON on unusual activity , stable  , \par \par CAD  CABG Prior PCI  :  abnormal nuclear stress test   showing mild basal  mid anterior wall ischemia , patient says he does not have any exertional chest pain , he does not want to have cardiac cath ,  continue his present medication .  advised the patient to seek medical attention if he develops  chest pain or shortness of breath \par \par DM:, improved from last visit  Hb a1c 7.4  , advised the patient to change metformin to 1000 mg po bid  , glipizide to 5 mg twice   ,tradjenta  home monitoring ,recommended opthalmology , podiatric evaluation . \par \par HLD : continue low cholesterol diet ,change  medication due to side effect , crestor 5 mg po daily , \par \par HTN:   elevated as he had salty food , advised to follow low salt diet , home BP monitor ,continue metoprolol succinate , losartan 100 mg po daily ,\par \par BPH , advised the patient to be evaluated by urologist ,\par \par \par anemia : dropped , scheduled to have colonoscopy , \par \par Patient is stable and optimal for low risk procedure ,  Patient  is intermediate risk .  advised patient to hold plavix 5 days prior to prior \par \par \par patient was advised to see urologist ,

## 2023-03-16 NOTE — HISTORY OF PRESENT ILLNESS
[FreeTextEntry1] : Patient  with hx of DM,HTN,HLD CABG 3 vessel CABG who came for pre op evaluation for endoscopy /colonoscopy  as patient had anemia  .\par \par Patient denies any chest pain or shortness of breath or dizziness . \par \par Patient does exercise regularly without any difficulty, patient does not sleep well due to urination problem , \par \par Patient had blood work showed elevated fasting sugar 145 , hemoglobin A1c 7.4   \par normal lipid profile   slowly worsening of  hemoglobin 10.4 decreased from 11.9   decreased from 14.2  ferritin 10\par \par patient is taking low dose crestor 5 mg po daily now , tolerating well  LDL 61 , creatinine 1.43  blood pressure is  elevated as he had very salty food last night \par \par Patient did not see urology , \par \par

## 2023-03-28 LAB — SARS-COV-2 N GENE NPH QL NAA+PROBE: NOT DETECTED

## 2023-03-29 ENCOUNTER — TRANSCRIPTION ENCOUNTER (OUTPATIENT)
Age: 83
End: 2023-03-29

## 2023-03-30 ENCOUNTER — OUTPATIENT (OUTPATIENT)
Dept: OUTPATIENT SERVICES | Facility: HOSPITAL | Age: 83
LOS: 1 days | End: 2023-03-30
Payer: MEDICARE

## 2023-03-30 ENCOUNTER — RESULT REVIEW (OUTPATIENT)
Age: 83
End: 2023-03-30

## 2023-03-30 ENCOUNTER — APPOINTMENT (OUTPATIENT)
Dept: GASTROENTEROLOGY | Facility: HOSPITAL | Age: 83
End: 2023-03-30

## 2023-03-30 DIAGNOSIS — D50.9 IRON DEFICIENCY ANEMIA, UNSPECIFIED: ICD-10-CM

## 2023-03-30 PROCEDURE — 88305 TISSUE EXAM BY PATHOLOGIST: CPT

## 2023-03-30 PROCEDURE — 88313 SPECIAL STAINS GROUP 2: CPT

## 2023-03-30 PROCEDURE — C1889: CPT

## 2023-03-30 PROCEDURE — 88312 SPECIAL STAINS GROUP 1: CPT

## 2023-03-30 PROCEDURE — 45385 COLONOSCOPY W/LESION REMOVAL: CPT

## 2023-03-30 PROCEDURE — 88305 TISSUE EXAM BY PATHOLOGIST: CPT | Mod: 26

## 2023-03-30 PROCEDURE — 88312 SPECIAL STAINS GROUP 1: CPT | Mod: 26

## 2023-03-30 PROCEDURE — 45385 COLONOSCOPY W/LESION REMOVAL: CPT | Mod: 59

## 2023-03-30 PROCEDURE — 43239 EGD BIOPSY SINGLE/MULTIPLE: CPT

## 2023-03-30 PROCEDURE — 45381 COLONOSCOPY SUBMUCOUS NJX: CPT

## 2023-03-30 PROCEDURE — 88313 SPECIAL STAINS GROUP 2: CPT | Mod: 26

## 2023-03-30 PROCEDURE — 82962 GLUCOSE BLOOD TEST: CPT

## 2023-03-30 DEVICE — CLIP RESOLUTION 360 235CM: Type: IMPLANTABLE DEVICE | Status: FUNCTIONAL

## 2023-03-31 LAB — SURGICAL PATHOLOGY STUDY: SIGNIFICANT CHANGE UP

## 2023-04-01 ENCOUNTER — TRANSCRIPTION ENCOUNTER (OUTPATIENT)
Age: 83
End: 2023-04-01

## 2023-04-04 ENCOUNTER — APPOINTMENT (OUTPATIENT)
Dept: GASTROENTEROLOGY | Facility: CLINIC | Age: 83
End: 2023-04-04

## 2023-04-11 ENCOUNTER — APPOINTMENT (OUTPATIENT)
Dept: GASTROENTEROLOGY | Facility: CLINIC | Age: 83
End: 2023-04-11
Payer: MEDICARE

## 2023-04-11 VITALS
DIASTOLIC BLOOD PRESSURE: 68 MMHG | WEIGHT: 152 LBS | BODY MASS INDEX: 23.04 KG/M2 | SYSTOLIC BLOOD PRESSURE: 119 MMHG | HEART RATE: 70 BPM | OXYGEN SATURATION: 97 % | RESPIRATION RATE: 16 BRPM | HEIGHT: 68 IN

## 2023-04-11 DIAGNOSIS — D13.1 BENIGN NEOPLASM OF STOMACH: ICD-10-CM

## 2023-04-11 DIAGNOSIS — D36.9 BENIGN NEOPLASM, UNSPECIFIED SITE: ICD-10-CM

## 2023-04-11 DIAGNOSIS — K29.80 DUODENITIS W/OUT BLEEDING: ICD-10-CM

## 2023-04-11 PROCEDURE — 99213 OFFICE O/P EST LOW 20 MIN: CPT

## 2023-04-13 NOTE — PHYSICAL EXAM
[None] : no edema [Normal] : normal bowel sounds, non-tender, no masses, soft, no no hepato-splenomegaly [de-identified] : Deferred

## 2023-04-13 NOTE — HISTORY OF PRESENT ILLNESS
[FreeTextEntry1] : Patient is an 83-year-old male on Plavix who had a drop in his hemoglobin and hematocrit for which she underwent an upper endoscopy and a colonoscopy on June 30, 2023.  Upper endoscopy revealed gastroduodenitis and 2 small fundic polyps.  Colonoscopy revealed adenomatous polyps in the sigmoid colon x2, and in the right colon there was a larger approximately 3 cm relatively sessile lesion which was partially removed after saline lifting and tattoo and Endo Clip pathology revealed fundic gland polyps and mild nonspecific chronic duodenitis.  Stomach biopsies were negative for gastritis or Helicobacter pylori.  Colon polyps in the sigmoid were adenomatous polyps x2 and a villoglandular polyp in the right colon.  Patient has resumed his Plavix and he is taking ferrous sulfate twice a day.  He denies rectal bleeding or melanotic stools.  His energy level is improving and he is less fatigued.

## 2023-04-13 NOTE — ASSESSMENT
[FreeTextEntry1] : Patient is not 83-year-old male who was first evaluated for anemia with a drop in his hemoglobin and hematocrit with a hemoglobin of 10.4 and hematocrit of 35.9 with a baseline hemoglobin of 15.  Patient was found to have duodenitis and adenomatous polyps.  2 adenomas were removed from the sigmoid colon and a larger sessile for low glandular polyp was located in the right colon which was partially removed, tattooed, and Endo Clipped.  Will refer patient to Dr. Chriss Rose for evaluation of repeat colonoscopy and polypectomy in an attempt to prevent laparoscopic colectomy in this elderly patient on anticoagulation.

## 2023-05-10 NOTE — PHYSICAL EXAM
[Normal S1, S2] : normal S1, S2 [No Murmur] : no murmur [No Gallop] : no gallop [Soft] : abdomen soft [Non Tender] : non-tender [Normal Bowel Sounds] : normal bowel sounds [No Edema] : no edema [Normal] : moves all extremities, no focal deficits, normal speech [Alert and Oriented] : alert and oriented [Normal memory] : normal memory

## 2023-05-11 ENCOUNTER — APPOINTMENT (OUTPATIENT)
Dept: CARDIOLOGY | Facility: CLINIC | Age: 83
End: 2023-05-11
Payer: MEDICARE

## 2023-05-11 VITALS
WEIGHT: 154 LBS | SYSTOLIC BLOOD PRESSURE: 116 MMHG | HEART RATE: 73 BPM | OXYGEN SATURATION: 97 % | DIASTOLIC BLOOD PRESSURE: 68 MMHG | HEIGHT: 68 IN | BODY MASS INDEX: 23.34 KG/M2

## 2023-05-11 PROCEDURE — 93000 ELECTROCARDIOGRAM COMPLETE: CPT

## 2023-05-11 PROCEDURE — 99214 OFFICE O/P EST MOD 30 MIN: CPT

## 2023-05-11 NOTE — HISTORY OF PRESENT ILLNESS
[FreeTextEntry1] : Patient  with hx of DM,HTN,HLD CABG 3 vessel CABG  anemia , who came for follow up , had endoscopy and colonoscopy , noted to have gastroduodenitis , colon polyps ,  scheduled to see a nother GI \par \par Patient lost 4  pounds  ,   \par \par Patient denies any chest pain or shortness of breath or dizziness . \par \par Patient does exercise regularly without any difficulty, patient does not sleep well due to urination problem , \par \par Patient had blood work showed elevated fasting sugar 145 , hemoglobin A1c 7.4   \par \par normal lipid profile   slowly worsening of  hemoglobin 10.4 decreased from 11.9   decreased from 14.2  ferritin 10 on Iron tablet , which he is not taking well \par \par patient is taking low dose crestor 5 mg po daily now , tolerating well  LDL 61 , creatinine 1.43  blood pressure is  elevated as he had very salty food last night \par \par Patient did not see urology , \par \par

## 2023-05-11 NOTE — ASSESSMENT
[FreeTextEntry1] : Patient with above hx \par \par Anemia due to iron deficiency: gastroduodenitis  colon polyps ,  residual polyp , to have polypectomy , dr Rose  \par  advised to continue Iron , continue omeprazole 40 mg po daily \par \par STINSON on unusual activity , stable  , \par \par CAD  CABG Prior PCI  :  abnormal nuclear stress test   showing mild basal  mid anterior wall ischemia , patient says he does not have any exertional chest pain , he does not want to have cardiac cath ,  continue his present medication .  advised the patient to seek medical attention if he develops  chest pain or shortness of breath \par \par DM:, improved from last visit  Hb a1c 7.4 ,advised the patient to change metformin to 1000 mg po bid  , glipizide to 5 mg twice ,tradjenta  home monitoring ,recommended opthalmology , podiatric evaluation . \par \par HLD : continue low cholesterol diet ,change  medication due to side effect , crestor 5 mg po daily , \par \par HTN:  improved  , advised to follow low salt diet , home BP monitor ,continue metoprolol succinate , losartan 100 mg po daily ,\par \par BPH , advised the patient to be evaluated by urologist ,\par \par \par patient was advised to see urologist ,

## 2023-05-11 NOTE — CARDIOLOGY SUMMARY
[de-identified] : 3/16/23   sinus rhythm non specific T changes  [de-identified] : 1/20/22  medium size mild to moderate basal ,mid anterior wall ischemia  EF 52% on chemical stress test  [de-identified] : 6/24/21  Normal EF 55-60%  paradoxical septal motion   Minimal trace  [de-identified] : 11/067570  16-49% mild plaque Right  ,1-15% minimal plaque left CCA

## 2023-05-17 LAB
BASOPHILS # BLD AUTO: 0.05 K/UL
BASOPHILS NFR BLD AUTO: 1 %
EOSINOPHIL # BLD AUTO: 0.21 K/UL
EOSINOPHIL NFR BLD AUTO: 4 %
HCT VFR BLD CALC: 39.3 %
HGB BLD-MCNC: 12 G/DL
IMM GRANULOCYTES NFR BLD AUTO: 0.2 %
LYMPHOCYTES # BLD AUTO: 1.23 K/UL
LYMPHOCYTES NFR BLD AUTO: 23.5 %
MAN DIFF?: NORMAL
MCHC RBC-ENTMCNC: 25.5 PG
MCHC RBC-ENTMCNC: 30.5 GM/DL
MCV RBC AUTO: 83.4 FL
MONOCYTES # BLD AUTO: 0.47 K/UL
MONOCYTES NFR BLD AUTO: 9 %
NEUTROPHILS # BLD AUTO: 3.27 K/UL
NEUTROPHILS NFR BLD AUTO: 62.3 %
PLATELET # BLD AUTO: 207 K/UL
RBC # BLD: 4.71 M/UL
RBC # FLD: 18.4 %
WBC # FLD AUTO: 5.24 K/UL

## 2023-05-30 ENCOUNTER — APPOINTMENT (OUTPATIENT)
Dept: INTERNAL MEDICINE | Facility: CLINIC | Age: 83
End: 2023-05-30

## 2023-06-08 ENCOUNTER — APPOINTMENT (OUTPATIENT)
Dept: GASTROENTEROLOGY | Facility: CLINIC | Age: 83
End: 2023-06-08
Payer: MEDICARE

## 2023-06-08 VITALS
SYSTOLIC BLOOD PRESSURE: 122 MMHG | WEIGHT: 150 LBS | BODY MASS INDEX: 22.73 KG/M2 | HEART RATE: 67 BPM | TEMPERATURE: 97.3 F | HEIGHT: 68 IN | OXYGEN SATURATION: 98 % | DIASTOLIC BLOOD PRESSURE: 79 MMHG

## 2023-06-08 PROCEDURE — 99215 OFFICE O/P EST HI 40 MIN: CPT

## 2023-06-14 ENCOUNTER — RX RENEWAL (OUTPATIENT)
Age: 83
End: 2023-06-14

## 2023-06-14 NOTE — CONSULT LETTER
[Dear  ___] : Dear  [unfilled], [Consult Letter:] : I had the pleasure of evaluating your patient, [unfilled]. [Please see my note below.] : Please see my note below. [Consult Closing:] : Thank you very much for allowing me to participate in the care of this patient.  If you have any questions, please do not hesitate to contact me. [Sincerely,] : Sincerely, [FreeTextEntry3] : Chriss Rose MD, MPH, SELINA, MARÍA\par Chief of Clinical Quality in Gastroenterology, [facility]\par Associate Chief of Gastroenterology, [facility]\par Interim Director of Endoscopy, [facility]\par Director of Endoscopic Ultrasound, [facility]\par [address], [address]\par [facility], [address]\par 24 hours [phone]\par \par

## 2023-06-14 NOTE — HISTORY OF PRESENT ILLNESS
[FreeTextEntry1] : Patient referred for large adenomatous polyp, specifically villoglandular polyp, in the right colon, which was partially removed during colonoscopy, and patient referred for completion polypectomy.\par \par Has some weight loss.\par \par No fevers, chills, abdominal pain, heartburn, dysphagia, nausea, vomiting, constipation, diarrhea, melena, hematochezia, jaundice\par \par No chest pain, dyspnea, dizziness.\par \par Colonoscopy demonstrated approximately 3 cm relatively sessile lesion in the right colon which was partially removed by endoscopic mucosal resection, with Endo Clip placement on polypectomy site, and area was marked with tattoo.  Additional 2 sigmoid adenomatous polyps removed.\par \par \par \par

## 2023-06-14 NOTE — PHYSICAL EXAM
[Alert] : alert [Sclera] : the sclera and conjunctiva were normal [Auscultation Breath Sounds / Voice Sounds] : lungs were clear to auscultation bilaterally [Heart Rate And Rhythm] : heart rate was normal and rhythm regular [No CVA Tenderness] : no CVA  tenderness [de-identified] : Soft, nondistended, nontender, bowel sounds present. [de-identified] : No confusion. [de-identified] : No jaundice.

## 2023-06-14 NOTE — ASSESSMENT
[FreeTextEntry1] : Impression: \par \par #0.  Unintentional weight loss\par \par #1.  Approximately 3 cm right colon lesion, partially removed by endoscopic mucosal resection with Hemoclip placement to reduce risk of bleeding, referred for completion polypectomy.\par \par #2.  Iron deficiency anemia.  \par \par #3.  Coronary artery disease on Plavix and rosuvastatin\par \par #4.  Diabetes on metformin\par \par Recommendations:\par \par #1.  Laboratory testing as below.\par \par #2.  CT scan without contrast due to contrast allergy.  If unable to obtain CT scan, would recommend ultrasound of the abdomen.\par \par #3.  We will schedule colonoscopy after above testing is done.  Patient will notify me when complete. \par \par Will need presurgical testing appointment.\par \par Risks, benefits, alternatives of the procedure were discussed with the patient and the patient was educated about the procedure. Risks include, but are not limited to, bleeding, infection, injury to internal organs, possible need for further procedures including emergency surgery, missed lesions, risk of anesthesia, and risk of IV site problems.  Patient understands and agrees to proceed.\par

## 2023-06-20 LAB
AMYLASE/CREAT SERPL: 103 U/L
CANCER AG19-9 SERPL-ACNC: 26 U/ML
CEA SERPL-MCNC: 3.6 NG/ML
LPL SERPL-CCNC: 74 U/L

## 2023-06-29 ENCOUNTER — RESULT REVIEW (OUTPATIENT)
Age: 83
End: 2023-06-29

## 2023-07-01 ENCOUNTER — APPOINTMENT (OUTPATIENT)
Dept: ULTRASOUND IMAGING | Facility: CLINIC | Age: 83
End: 2023-07-01
Payer: MEDICARE

## 2023-07-01 ENCOUNTER — OUTPATIENT (OUTPATIENT)
Dept: OUTPATIENT SERVICES | Facility: HOSPITAL | Age: 83
LOS: 1 days | End: 2023-07-01
Payer: MEDICARE

## 2023-07-01 ENCOUNTER — APPOINTMENT (OUTPATIENT)
Dept: CT IMAGING | Facility: CLINIC | Age: 83
End: 2023-07-01
Payer: MEDICARE

## 2023-07-01 DIAGNOSIS — D50.9 IRON DEFICIENCY ANEMIA, UNSPECIFIED: ICD-10-CM

## 2023-07-01 DIAGNOSIS — R63.4 ABNORMAL WEIGHT LOSS: ICD-10-CM

## 2023-07-01 DIAGNOSIS — D36.9 BENIGN NEOPLASM, UNSPECIFIED SITE: ICD-10-CM

## 2023-07-01 PROCEDURE — 76700 US EXAM ABDOM COMPLETE: CPT | Mod: 26

## 2023-07-01 PROCEDURE — 74176 CT ABD & PELVIS W/O CONTRAST: CPT | Mod: 26

## 2023-07-01 PROCEDURE — 74176 CT ABD & PELVIS W/O CONTRAST: CPT

## 2023-07-01 PROCEDURE — 76700 US EXAM ABDOM COMPLETE: CPT

## 2023-08-10 ENCOUNTER — INPATIENT (INPATIENT)
Facility: HOSPITAL | Age: 83
LOS: 1 days | Discharge: HOME | DRG: 867 | End: 2023-08-12
Attending: HOSPITALIST | Admitting: INTERNAL MEDICINE
Payer: MEDICARE

## 2023-08-10 VITALS — HEIGHT: 70 IN | WEIGHT: 149.91 LBS

## 2023-08-10 DIAGNOSIS — E87.1 HYPO-OSMOLALITY AND HYPONATREMIA: ICD-10-CM

## 2023-08-10 LAB
ADD ON TEST-SPECIMEN IN LAB: SIGNIFICANT CHANGE UP
ALBUMIN SERPL ELPH-MCNC: 2.5 G/DL — LOW (ref 3.3–5)
ALP SERPL-CCNC: 65 U/L — SIGNIFICANT CHANGE UP (ref 40–120)
ALT FLD-CCNC: 28 U/L — SIGNIFICANT CHANGE UP (ref 12–78)
ANION GAP SERPL CALC-SCNC: 6 MMOL/L — SIGNIFICANT CHANGE UP (ref 5–17)
ANISOCYTOSIS BLD QL: SLIGHT — SIGNIFICANT CHANGE UP
APPEARANCE UR: CLEAR — SIGNIFICANT CHANGE UP
APTT BLD: 32.3 SEC — SIGNIFICANT CHANGE UP (ref 24.5–35.6)
AST SERPL-CCNC: 22 U/L — SIGNIFICANT CHANGE UP (ref 15–37)
BACTERIA # UR AUTO: ABNORMAL
BASOPHILS # BLD AUTO: 0 K/UL — SIGNIFICANT CHANGE UP (ref 0–0.2)
BASOPHILS NFR BLD AUTO: 0 % — SIGNIFICANT CHANGE UP (ref 0–2)
BILIRUB SERPL-MCNC: 0.7 MG/DL — SIGNIFICANT CHANGE UP (ref 0.2–1.2)
BILIRUB UR-MCNC: NEGATIVE — SIGNIFICANT CHANGE UP
BUN SERPL-MCNC: 23 MG/DL — SIGNIFICANT CHANGE UP (ref 7–23)
CALCIUM SERPL-MCNC: 8.2 MG/DL — LOW (ref 8.5–10.1)
CHLORIDE SERPL-SCNC: 98 MMOL/L — SIGNIFICANT CHANGE UP (ref 96–108)
CO2 SERPL-SCNC: 23 MMOL/L — SIGNIFICANT CHANGE UP (ref 22–31)
COLOR SPEC: YELLOW — SIGNIFICANT CHANGE UP
CREAT SERPL-MCNC: 1.37 MG/DL — HIGH (ref 0.5–1.3)
DIFF PNL FLD: ABNORMAL
EGFR: 52 ML/MIN/1.73M2 — LOW
ELLIPTOCYTES BLD QL SMEAR: SLIGHT — SIGNIFICANT CHANGE UP
EOSINOPHIL # BLD AUTO: 0 K/UL — SIGNIFICANT CHANGE UP (ref 0–0.5)
EOSINOPHIL NFR BLD AUTO: 0 % — SIGNIFICANT CHANGE UP (ref 0–6)
EPI CELLS # UR: NEGATIVE — SIGNIFICANT CHANGE UP
GLUCOSE SERPL-MCNC: 226 MG/DL — HIGH (ref 70–99)
GLUCOSE UR QL: 250
HCT VFR BLD CALC: 29.1 % — LOW (ref 39–50)
HGB BLD-MCNC: 10.2 G/DL — LOW (ref 13–17)
INR BLD: 1.25 RATIO — HIGH (ref 0.85–1.18)
KETONES UR-MCNC: NEGATIVE — SIGNIFICANT CHANGE UP
LACTATE SERPL-SCNC: 1.5 MMOL/L — SIGNIFICANT CHANGE UP (ref 0.7–2)
LEUKOCYTE ESTERASE UR-ACNC: NEGATIVE — SIGNIFICANT CHANGE UP
LIDOCAIN IGE QN: 272 U/L — SIGNIFICANT CHANGE UP (ref 73–393)
LYMPHOCYTES # BLD AUTO: 1.15 K/UL — SIGNIFICANT CHANGE UP (ref 1–3.3)
LYMPHOCYTES # BLD AUTO: 20 % — SIGNIFICANT CHANGE UP (ref 13–44)
MAGNESIUM SERPL-MCNC: 2.1 MG/DL — SIGNIFICANT CHANGE UP (ref 1.6–2.6)
MANUAL SMEAR VERIFICATION: SIGNIFICANT CHANGE UP
MCHC RBC-ENTMCNC: 28.3 PG — SIGNIFICANT CHANGE UP (ref 27–34)
MCHC RBC-ENTMCNC: 35.1 GM/DL — SIGNIFICANT CHANGE UP (ref 32–36)
MCV RBC AUTO: 80.8 FL — SIGNIFICANT CHANGE UP (ref 80–100)
MONOCYTES # BLD AUTO: 1.62 K/UL — HIGH (ref 0–0.9)
MONOCYTES NFR BLD AUTO: 28 % — HIGH (ref 2–14)
NEUTROPHILS # BLD AUTO: 2.89 K/UL — SIGNIFICANT CHANGE UP (ref 1.8–7.4)
NEUTROPHILS NFR BLD AUTO: 50 % — SIGNIFICANT CHANGE UP (ref 43–77)
NITRITE UR-MCNC: NEGATIVE — SIGNIFICANT CHANGE UP
NRBC # BLD: 0 /100 — SIGNIFICANT CHANGE UP (ref 0–0)
NRBC # BLD: SIGNIFICANT CHANGE UP /100 WBCS (ref 0–0)
OVALOCYTES BLD QL SMEAR: SLIGHT — SIGNIFICANT CHANGE UP
PARASITE BLD: SIGNIFICANT CHANGE UP
PH UR: 6 — SIGNIFICANT CHANGE UP (ref 5–8)
PLAT MORPH BLD: NORMAL — SIGNIFICANT CHANGE UP
PLATELET # BLD AUTO: 129 K/UL — LOW (ref 150–400)
POIKILOCYTOSIS BLD QL AUTO: SLIGHT — SIGNIFICANT CHANGE UP
POTASSIUM SERPL-MCNC: 4.7 MMOL/L — SIGNIFICANT CHANGE UP (ref 3.5–5.3)
POTASSIUM SERPL-SCNC: 4.7 MMOL/L — SIGNIFICANT CHANGE UP (ref 3.5–5.3)
PROT SERPL-MCNC: 6.3 GM/DL — SIGNIFICANT CHANGE UP (ref 6–8.3)
PROT UR-MCNC: NEGATIVE — SIGNIFICANT CHANGE UP
PROTHROM AB SERPL-ACNC: 14 SEC — HIGH (ref 9.5–13)
RBC # BLD: 3.6 M/UL — LOW (ref 4.2–5.8)
RBC # FLD: 15.1 % — HIGH (ref 10.3–14.5)
RBC BLD AUTO: ABNORMAL
RBC CASTS # UR COMP ASSIST: SIGNIFICANT CHANGE UP /HPF (ref 0–4)
SODIUM SERPL-SCNC: 127 MMOL/L — LOW (ref 135–145)
SP GR SPEC: 1.01 — SIGNIFICANT CHANGE UP (ref 1.01–1.02)
TROPONIN I, HIGH SENSITIVITY RESULT: 7.27 NG/L — SIGNIFICANT CHANGE UP
UROBILINOGEN FLD QL: NEGATIVE — SIGNIFICANT CHANGE UP
VARIANT LYMPHS # BLD: 2 % — SIGNIFICANT CHANGE UP (ref 0–6)
WBC # BLD: 5.77 K/UL — SIGNIFICANT CHANGE UP (ref 3.8–10.5)
WBC # FLD AUTO: 5.77 K/UL — SIGNIFICANT CHANGE UP (ref 3.8–10.5)
WBC UR QL: NEGATIVE /HPF — SIGNIFICANT CHANGE UP (ref 0–5)

## 2023-08-10 PROCEDURE — 93010 ELECTROCARDIOGRAM REPORT: CPT

## 2023-08-10 PROCEDURE — 0225U NFCT DS DNA&RNA 21 SARSCOV2: CPT

## 2023-08-10 PROCEDURE — 70450 CT HEAD/BRAIN W/O DYE: CPT | Mod: 26,MA

## 2023-08-10 PROCEDURE — 97163 PT EVAL HIGH COMPLEX 45 MIN: CPT | Mod: GP

## 2023-08-10 PROCEDURE — 83036 HEMOGLOBIN GLYCOSYLATED A1C: CPT

## 2023-08-10 PROCEDURE — 72125 CT NECK SPINE W/O DYE: CPT | Mod: 26,MA

## 2023-08-10 PROCEDURE — 71045 X-RAY EXAM CHEST 1 VIEW: CPT | Mod: 26

## 2023-08-10 PROCEDURE — 97116 GAIT TRAINING THERAPY: CPT | Mod: GP

## 2023-08-10 PROCEDURE — 87798 DETECT AGENT NOS DNA AMP: CPT

## 2023-08-10 PROCEDURE — 85025 COMPLETE CBC W/AUTO DIFF WBC: CPT

## 2023-08-10 PROCEDURE — 84300 ASSAY OF URINE SODIUM: CPT

## 2023-08-10 PROCEDURE — 72170 X-RAY EXAM OF PELVIS: CPT | Mod: 26

## 2023-08-10 PROCEDURE — 82962 GLUCOSE BLOOD TEST: CPT

## 2023-08-10 PROCEDURE — 99285 EMERGENCY DEPT VISIT HI MDM: CPT

## 2023-08-10 PROCEDURE — 87468 ANAPLSMA PHGCYTOPHLM AMP PRB: CPT

## 2023-08-10 PROCEDURE — 80048 BASIC METABOLIC PNL TOTAL CA: CPT

## 2023-08-10 PROCEDURE — 80053 COMPREHEN METABOLIC PANEL: CPT

## 2023-08-10 PROCEDURE — 83935 ASSAY OF URINE OSMOLALITY: CPT

## 2023-08-10 PROCEDURE — 36415 COLL VENOUS BLD VENIPUNCTURE: CPT

## 2023-08-10 PROCEDURE — 86618 LYME DISEASE ANTIBODY: CPT

## 2023-08-10 PROCEDURE — 87484 EHRLICHA CHAFFEENSIS AMP PRB: CPT

## 2023-08-10 PROCEDURE — 83930 ASSAY OF BLOOD OSMOLALITY: CPT

## 2023-08-10 PROCEDURE — G0378: CPT

## 2023-08-10 RX ORDER — AZITHROMYCIN 500 MG/1
TABLET, FILM COATED ORAL
Refills: 0 | Status: DISCONTINUED | OUTPATIENT
Start: 2023-08-10 | End: 2023-08-11

## 2023-08-10 RX ORDER — AZITHROMYCIN 500 MG/1
500 TABLET, FILM COATED ORAL EVERY 24 HOURS
Refills: 0 | Status: DISCONTINUED | OUTPATIENT
Start: 2023-08-11 | End: 2023-08-11

## 2023-08-10 RX ORDER — SODIUM CHLORIDE 9 MG/ML
500 INJECTION INTRAMUSCULAR; INTRAVENOUS; SUBCUTANEOUS ONCE
Refills: 0 | Status: COMPLETED | OUTPATIENT
Start: 2023-08-10 | End: 2023-08-10

## 2023-08-10 RX ORDER — AZITHROMYCIN 500 MG/1
500 TABLET, FILM COATED ORAL ONCE
Refills: 0 | Status: COMPLETED | OUTPATIENT
Start: 2023-08-10 | End: 2023-08-10

## 2023-08-10 RX ORDER — ATOVAQUONE 750 MG/5ML
750 SUSPENSION ORAL
Refills: 0 | Status: DISCONTINUED | OUTPATIENT
Start: 2023-08-10 | End: 2023-08-12

## 2023-08-10 RX ADMIN — ATOVAQUONE 750 MILLIGRAM(S): 750 SUSPENSION ORAL at 22:31

## 2023-08-10 RX ADMIN — AZITHROMYCIN 500 MILLIGRAM(S): 500 TABLET, FILM COATED ORAL at 22:42

## 2023-08-10 RX ADMIN — SODIUM CHLORIDE 333.33 MILLILITER(S): 9 INJECTION INTRAMUSCULAR; INTRAVENOUS; SUBCUTANEOUS at 20:41

## 2023-08-10 RX ADMIN — SODIUM CHLORIDE 500 MILLILITER(S): 9 INJECTION INTRAMUSCULAR; INTRAVENOUS; SUBCUTANEOUS at 21:41

## 2023-08-10 NOTE — ED PROVIDER NOTE - EYES, MLM
Clear bilaterally, pupils equal, round and reactive to light. Clear bilaterally, pupils equal, round and reactive to light.  EOMI.  No raccoon's.

## 2023-08-10 NOTE — ED PROVIDER NOTE - NEUROLOGICAL, MLM
Alert and oriented, no focal deficits, no motor or sensory deficits. cranial nerves intact except +[place]. normal speech.

## 2023-08-10 NOTE — ED PROVIDER NOTE - OBJECTIVE STATEMENT
84 yo male with PMHx of HTN, HLD, DM type 2, CAD s/p CABG, iron deficiency anemia, on plavix presents to ED s/p fall mid afternoon today. wife suspects head injury, also concerned about pt being more weak and tired x 3 days. as per wife, pt inc in urinary frequency and urgency. tactile fever last night. endorses decreased appetite and mild cough. pt fell 2 days ago with no pain, no md eval at time. pt denies n/v, ha, dysuria, pain to neck, back, extremities from fall. admits feeling extra weak and tired. baseline hard of hearing. NKDA. today bgm 204. endorses some recent weight loss. wife c/o pt sleeping a lot.     cardio: dr. palla  pcp: dr. allen 82 yo male with PMHx of HTN, HLD, DM type 2, CAD s/p CABG, iron deficiency anemia, on plavix, BIB pvt car from home to ED s/p fall mid-afternoon today. wife suspects head injury, also concerned about pt being more weak and tired x 3 days. as per wife, pt + increased urinary frequency and urgency. tactile fever last night. endorses decreased appetite and mild cough, no SOB. pt also fell 2 days ago with no pain, no md eval at time. pt denies n/v, ha, dysuria, pain to neck, back, extremities from fall. admits feeling extra weak and tired. baseline hard of hearing. NKDA. today bgm 204. endorses some recent weight loss. wife c/o pt sleeping a lot, not his baseline.    cardio: dr. palla  pcp: dr. allen

## 2023-08-10 NOTE — ED PROVIDER NOTE - PROGRESS NOTE DETAILS
Bina Renteria :  rectal exam normal, minimal stool and rectal vault, guaiac neg, lot 239, expires 10/31/23, qc passed. minimal prostate tenderness on palpation. Bina Bauman for Dr. Renteria : blood bank called ?parasites within rbc cells. they are further reviewing and will do blood smears. Bina Bauman for Dr. Renteria : blood bank called ?parasites within rbc cells. they are further reviewing. Bina Bauman for Dr. Renteria : blood bank called: ?parasites within rbc cells. they are further reviewing.

## 2023-08-10 NOTE — ED PROVIDER NOTE - CLINICAL SUMMARY MEDICAL DECISION MAKING FREE TEXT BOX
82 yo male with PMHx of HTN, HLD, DM type 2, iron deficiency anemia, CAD s/p CABG bib private vehicle via family s/p fall today with suspected head injury. no loc, +plavix. wife also reports 3 days inc gen weakness, malaise, urinary frequency and urgency. pt sleeping a lot. no focal tenderness on exam. pt alert. no resp distress. plan na: ct head, c spine. xr chest and pelvis. labs incl ua w/culture, blood culture, lactate, trop, magnesium, ekg, fall precautions, cautious ivf, monitor, observe, reassess. 84 yo male with PMHx of HTN, HLD, DM type 2, iron deficiency anemia, CAD s/p CABG bib private vehicle via family s/p fall today with suspected head injury. no loc, +plavix. wife also reports 3 days inc gen weakness, malaise, urinary frequency and urgency. pt sleeping a lot. no focal tenderness on exam. pt alert. no resp distress.   Plan Neuro Alert: CT head, c spine. xr chest and pelvis. labs incl ua w/culture, blood culture, lactate, trop, magnesium, ekg, fall precautions, cautious ivf, monitor, observe, reassess.    21:30, Bina Bauman for Dr. Renteria :  rectal exam normal, minimal stool and rectal vault, guaiac neg, lot 239, expires 10/31/23, qc passed. minimal prostate tenderness on palpation.    21:32, Bina Bauman for Dr. Renteria : blood bank called ?parasites within rbc cells. they are further reviewing.    21:40, RG Renteria MD:  Labs results notable for Hgb 10 (was ~ 12 just 3 mos. ago) though guaiac negative.  Also + moderate hypoNa of 127 (baseline 138 - 140).  U/A negative.   Dr. Chavez aware of Med admission as well as RBC morphology suggestive of Babesiosis: he ordered further blood tests & IV meds. 82 yo male with PMHx of HTN, HLD, DM type 2, iron deficiency anemia, CAD s/p CABG bib private vehicle via family s/p fall today with suspected head injury. no loc, +plavix. wife also reports 3 days increased general weakness, malaise, urinary frequency and urgency. pt sleeping a lot. no focal tenderness on exam. pt alert. no resp distress.   Plan Neuro Alert: CT head, c spine. XR chest and pelvis. Labs incl u/a w/culture, blood cultures, lactate, troponin, magnesium, ekg, fall precautions, cautious ivf, monitor, observe, reassess.    21:30, Bina Bauman for Dr. Renteria :  rectal exam normal, minimal stool and rectal vault, guaiac neg, lot 239, expires 10/31/23, qc passed. minimal prostate tenderness on palpation.    21:32, Bina Bauman for Dr. Renteria : blood bank called: ?parasites within rbc cells. they are further reviewing.    21:40, RG Renteria MD:  Labs results notable for Hgb 10 (was ~ 12 just 3 mos. ago) though guaiac negative.  Also + moderate hypoNa of 127 (baseline 138 - 140).  U/A negative.   Dr. Chavez aware of Med admission as well as RBC morphology suggestive of Babesiosis: he ordered further blood tests & IV meds.

## 2023-08-10 NOTE — ED ADULT NURSE NOTE - CHIEF COMPLAINT QUOTE
pt presents to ED with complaints of weakness x 3 days. per wife, pt fell today on plavix. wife unsure of head strike. pt also having urinary symptoms of frequency. Md Contino in triage to [facility] for neuro alert. neuro alert initiated.

## 2023-08-10 NOTE — ED PROVIDER NOTE - ENMT, MLM
Airway patent, Nasal mucosa clear. Mouth with normal mucosa. Throat has no vesicles, no oropharyngeal exudates and uvula is midline. NC/AT.  Airway patent, Nasal mucosa clear. Mouth with normal mucosa. Throat has no vesicles, no oropharyngeal exudates and uvula is midline.  No Thomas's.

## 2023-08-10 NOTE — ED ADULT TRIAGE NOTE - CHIEF COMPLAINT QUOTE
pt presents to ED with complaints of weakness x 3 days. per wife, pt fell today on plavix. wife unsure of head strike. pt also having urinary symptoms of frequency. Md Contino in triage to [facility] for neuro alert. neuro alert initiated.

## 2023-08-10 NOTE — ED ADULT NURSE NOTE - OBJECTIVE STATEMENT
Pt. presented to the ED c/o weakness/feeling tired x 3days. Pt. stated that at night he goes to the bathroom to urinate approximately 10x and doesn't know what's going on with him. Pt. also mentioned that he fell today. Pt. on plavix. Neuro alert activated. Hx. HTN, DM, HLD.

## 2023-08-10 NOTE — ED PROVIDER NOTE - CONSTITUTIONAL, MLM
normal... Well appearing, awake, alert, oriented to person, place, time/situation and in no apparent distress. non toxic. mildly ill. Well appearing, awake, alert, oriented to person, place, time/situation and in no apparent distress. non-toxic. mildly ill.

## 2023-08-10 NOTE — ED PROVIDER NOTE - NSICDXPASTMEDICALHX_GEN_ALL_CORE_FT
PAST MEDICAL HISTORY:  CAD (coronary artery disease)     DM2 (diabetes mellitus, type 2)     HLD (hyperlipidemia)     HTN (hypertension)     Iron deficiency anemia

## 2023-08-10 NOTE — ED PROVIDER NOTE - MUSCULOSKELETAL, MLM
connors x4. Spine appears normal, range of motion is not limited, no muscle or joint tenderness. no cvat. neck nt, non supple, w/o pain. back, cw, pelvis nt and stable. DENNIS x4. Spine appears normal, range of motion is not limited, no muscle or joint tenderness. Neck NT, supple w/o pain. Back, chest wall, pelvis NT and stable.

## 2023-08-11 ENCOUNTER — APPOINTMENT (OUTPATIENT)
Dept: INTERNAL MEDICINE | Facility: CLINIC | Age: 83
End: 2023-08-11

## 2023-08-11 ENCOUNTER — APPOINTMENT (OUTPATIENT)
Dept: CARDIOLOGY | Facility: CLINIC | Age: 83
End: 2023-08-11

## 2023-08-11 DIAGNOSIS — Z95.1 PRESENCE OF AORTOCORONARY BYPASS GRAFT: Chronic | ICD-10-CM

## 2023-08-11 DIAGNOSIS — Z95.5 PRESENCE OF CORONARY ANGIOPLASTY IMPLANT AND GRAFT: Chronic | ICD-10-CM

## 2023-08-11 LAB
A1C WITH ESTIMATED AVERAGE GLUCOSE RESULT: 6.8 % — HIGH (ref 4–5.6)
ALBUMIN SERPL ELPH-MCNC: 2.5 G/DL — LOW (ref 3.3–5)
ALP SERPL-CCNC: 60 U/L — SIGNIFICANT CHANGE UP (ref 40–120)
ALT FLD-CCNC: 23 U/L — SIGNIFICANT CHANGE UP (ref 12–78)
ANION GAP SERPL CALC-SCNC: 6 MMOL/L — SIGNIFICANT CHANGE UP (ref 5–17)
AST SERPL-CCNC: 19 U/L — SIGNIFICANT CHANGE UP (ref 15–37)
B BURGDOR C6 AB SER-ACNC: NEGATIVE — SIGNIFICANT CHANGE UP
B BURGDOR IGG+IGM SER QL IB: SIGNIFICANT CHANGE UP
B BURGDOR IGG+IGM SER-ACNC: 0.21 INDEX — SIGNIFICANT CHANGE UP (ref 0.01–0.89)
BABESIA MICROTI PCR, BLD RESULT: DETECTED
BASOPHILS # BLD AUTO: 0.04 K/UL — SIGNIFICANT CHANGE UP (ref 0–0.2)
BASOPHILS NFR BLD AUTO: 0.8 % — SIGNIFICANT CHANGE UP (ref 0–2)
BILIRUB SERPL-MCNC: 1 MG/DL — SIGNIFICANT CHANGE UP (ref 0.2–1.2)
BUN SERPL-MCNC: 19 MG/DL — SIGNIFICANT CHANGE UP (ref 7–23)
CALCIUM SERPL-MCNC: 8.1 MG/DL — LOW (ref 8.5–10.1)
CHLORIDE SERPL-SCNC: 102 MMOL/L — SIGNIFICANT CHANGE UP (ref 96–108)
CO2 SERPL-SCNC: 24 MMOL/L — SIGNIFICANT CHANGE UP (ref 22–31)
CREAT SERPL-MCNC: 1.22 MG/DL — SIGNIFICANT CHANGE UP (ref 0.5–1.3)
CULTURE RESULTS: SIGNIFICANT CHANGE UP
CULTURE RESULTS: SIGNIFICANT CHANGE UP
EGFR: 60 ML/MIN/1.73M2 — SIGNIFICANT CHANGE UP
EOSINOPHIL # BLD AUTO: 0.08 K/UL — SIGNIFICANT CHANGE UP (ref 0–0.5)
EOSINOPHIL NFR BLD AUTO: 1.6 % — SIGNIFICANT CHANGE UP (ref 0–6)
ESTIMATED AVERAGE GLUCOSE: 148 MG/DL — HIGH (ref 68–114)
GLUCOSE BLDC GLUCOMTR-MCNC: 163 MG/DL — HIGH (ref 70–99)
GLUCOSE BLDC GLUCOMTR-MCNC: 204 MG/DL — HIGH (ref 70–99)
GLUCOSE BLDC GLUCOMTR-MCNC: 208 MG/DL — HIGH (ref 70–99)
GLUCOSE BLDC GLUCOMTR-MCNC: 247 MG/DL — HIGH (ref 70–99)
GLUCOSE SERPL-MCNC: 175 MG/DL — HIGH (ref 70–99)
HCT VFR BLD CALC: 29.3 % — LOW (ref 39–50)
HGB BLD-MCNC: 10.2 G/DL — LOW (ref 13–17)
IMM GRANULOCYTES NFR BLD AUTO: 0.4 % — SIGNIFICANT CHANGE UP (ref 0–0.9)
LYMPHOCYTES # BLD AUTO: 1.51 K/UL — SIGNIFICANT CHANGE UP (ref 1–3.3)
LYMPHOCYTES # BLD AUTO: 30.1 % — SIGNIFICANT CHANGE UP (ref 13–44)
MCHC RBC-ENTMCNC: 28.1 PG — SIGNIFICANT CHANGE UP (ref 27–34)
MCHC RBC-ENTMCNC: 34.8 GM/DL — SIGNIFICANT CHANGE UP (ref 32–36)
MCV RBC AUTO: 80.7 FL — SIGNIFICANT CHANGE UP (ref 80–100)
MONOCYTES # BLD AUTO: 1.3 K/UL — HIGH (ref 0–0.9)
MONOCYTES NFR BLD AUTO: 25.9 % — HIGH (ref 2–14)
NEUTROPHILS # BLD AUTO: 2.06 K/UL — SIGNIFICANT CHANGE UP (ref 1.8–7.4)
NEUTROPHILS NFR BLD AUTO: 41.2 % — LOW (ref 43–77)
OSMOLALITY UR: 213 MOSM/KG — SIGNIFICANT CHANGE UP (ref 50–1200)
PLATELET # BLD AUTO: 128 K/UL — LOW (ref 150–400)
POTASSIUM SERPL-MCNC: 4.3 MMOL/L — SIGNIFICANT CHANGE UP (ref 3.5–5.3)
POTASSIUM SERPL-SCNC: 4.3 MMOL/L — SIGNIFICANT CHANGE UP (ref 3.5–5.3)
PROT SERPL-MCNC: 6.1 GM/DL — SIGNIFICANT CHANGE UP (ref 6–8.3)
RAPID RVP RESULT: SIGNIFICANT CHANGE UP
RBC # BLD: 3.63 M/UL — LOW (ref 4.2–5.8)
RBC # FLD: 15 % — HIGH (ref 10.3–14.5)
SARS-COV-2 RNA SPEC QL NAA+PROBE: SIGNIFICANT CHANGE UP
SODIUM SERPL-SCNC: 132 MMOL/L — LOW (ref 135–145)
SODIUM UR-SCNC: 52 MMOL/L — SIGNIFICANT CHANGE UP
SPECIMEN SOURCE: SIGNIFICANT CHANGE UP
SPECIMEN SOURCE: SIGNIFICANT CHANGE UP
WBC # BLD: 5.01 K/UL — SIGNIFICANT CHANGE UP (ref 3.8–10.5)
WBC # FLD AUTO: 5.01 K/UL — SIGNIFICANT CHANGE UP (ref 3.8–10.5)

## 2023-08-11 PROCEDURE — 99223 1ST HOSP IP/OBS HIGH 75: CPT

## 2023-08-11 RX ORDER — ACETAMINOPHEN 500 MG
650 TABLET ORAL EVERY 6 HOURS
Refills: 0 | Status: DISCONTINUED | OUTPATIENT
Start: 2023-08-11 | End: 2023-08-11

## 2023-08-11 RX ORDER — CLOPIDOGREL BISULFATE 75 MG/1
75 TABLET, FILM COATED ORAL DAILY
Refills: 0 | Status: DISCONTINUED | OUTPATIENT
Start: 2023-08-11 | End: 2023-08-12

## 2023-08-11 RX ORDER — INSULIN GLARGINE 100 [IU]/ML
10 INJECTION, SOLUTION SUBCUTANEOUS AT BEDTIME
Refills: 0 | Status: DISCONTINUED | OUTPATIENT
Start: 2023-08-11 | End: 2023-08-11

## 2023-08-11 RX ORDER — SODIUM CHLORIDE 9 MG/ML
1000 INJECTION, SOLUTION INTRAVENOUS
Refills: 0 | Status: DISCONTINUED | OUTPATIENT
Start: 2023-08-11 | End: 2023-08-12

## 2023-08-11 RX ORDER — METOPROLOL TARTRATE 50 MG
25 TABLET ORAL DAILY
Refills: 0 | Status: DISCONTINUED | OUTPATIENT
Start: 2023-08-11 | End: 2023-08-12

## 2023-08-11 RX ORDER — ROSUVASTATIN CALCIUM 5 MG/1
5 TABLET ORAL AT BEDTIME
Refills: 0 | Status: DISCONTINUED | OUTPATIENT
Start: 2023-08-11 | End: 2023-08-12

## 2023-08-11 RX ORDER — FINASTERIDE 5 MG/1
5 TABLET, FILM COATED ORAL DAILY
Refills: 0 | Status: DISCONTINUED | OUTPATIENT
Start: 2023-08-11 | End: 2023-08-12

## 2023-08-11 RX ORDER — INSULIN LISPRO 100/ML
VIAL (ML) SUBCUTANEOUS AT BEDTIME
Refills: 0 | Status: DISCONTINUED | OUTPATIENT
Start: 2023-08-11 | End: 2023-08-12

## 2023-08-11 RX ORDER — LOSARTAN POTASSIUM 100 MG/1
100 TABLET, FILM COATED ORAL DAILY
Refills: 0 | Status: DISCONTINUED | OUTPATIENT
Start: 2023-08-11 | End: 2023-08-12

## 2023-08-11 RX ORDER — DEXTROSE 50 % IN WATER 50 %
12.5 SYRINGE (ML) INTRAVENOUS ONCE
Refills: 0 | Status: DISCONTINUED | OUTPATIENT
Start: 2023-08-11 | End: 2023-08-12

## 2023-08-11 RX ORDER — LANOLIN ALCOHOL/MO/W.PET/CERES
3 CREAM (GRAM) TOPICAL AT BEDTIME
Refills: 0 | Status: DISCONTINUED | OUTPATIENT
Start: 2023-08-11 | End: 2023-08-12

## 2023-08-11 RX ORDER — INSULIN LISPRO 100/ML
VIAL (ML) SUBCUTANEOUS AT BEDTIME
Refills: 0 | Status: DISCONTINUED | OUTPATIENT
Start: 2023-08-11 | End: 2023-08-11

## 2023-08-11 RX ORDER — LINAGLIPTIN 5 MG/1
5 TABLET, FILM COATED ORAL DAILY
Refills: 0 | Status: DISCONTINUED | OUTPATIENT
Start: 2023-08-11 | End: 2023-08-12

## 2023-08-11 RX ORDER — ACETAMINOPHEN 500 MG
650 TABLET ORAL EVERY 6 HOURS
Refills: 0 | Status: DISCONTINUED | OUTPATIENT
Start: 2023-08-11 | End: 2023-08-12

## 2023-08-11 RX ORDER — GLUCAGON INJECTION, SOLUTION 0.5 MG/.1ML
1 INJECTION, SOLUTION SUBCUTANEOUS ONCE
Refills: 0 | Status: DISCONTINUED | OUTPATIENT
Start: 2023-08-11 | End: 2023-08-12

## 2023-08-11 RX ORDER — TAMSULOSIN HYDROCHLORIDE 0.4 MG/1
0.4 CAPSULE ORAL AT BEDTIME
Refills: 0 | Status: DISCONTINUED | OUTPATIENT
Start: 2023-08-11 | End: 2023-08-12

## 2023-08-11 RX ORDER — DEXTROSE 50 % IN WATER 50 %
25 SYRINGE (ML) INTRAVENOUS ONCE
Refills: 0 | Status: DISCONTINUED | OUTPATIENT
Start: 2023-08-11 | End: 2023-08-12

## 2023-08-11 RX ORDER — DEXTROSE 50 % IN WATER 50 %
15 SYRINGE (ML) INTRAVENOUS ONCE
Refills: 0 | Status: DISCONTINUED | OUTPATIENT
Start: 2023-08-11 | End: 2023-08-12

## 2023-08-11 RX ORDER — AZITHROMYCIN 500 MG/1
500 TABLET, FILM COATED ORAL AT BEDTIME
Refills: 0 | Status: DISCONTINUED | OUTPATIENT
Start: 2023-08-11 | End: 2023-08-12

## 2023-08-11 RX ORDER — ONDANSETRON 8 MG/1
4 TABLET, FILM COATED ORAL EVERY 8 HOURS
Refills: 0 | Status: DISCONTINUED | OUTPATIENT
Start: 2023-08-11 | End: 2023-08-12

## 2023-08-11 RX ORDER — INSULIN LISPRO 100/ML
3 VIAL (ML) SUBCUTANEOUS
Refills: 0 | Status: DISCONTINUED | OUTPATIENT
Start: 2023-08-11 | End: 2023-08-11

## 2023-08-11 RX ORDER — INSULIN LISPRO 100/ML
VIAL (ML) SUBCUTANEOUS
Refills: 0 | Status: DISCONTINUED | OUTPATIENT
Start: 2023-08-11 | End: 2023-08-12

## 2023-08-11 RX ORDER — PANTOPRAZOLE SODIUM 20 MG/1
40 TABLET, DELAYED RELEASE ORAL
Refills: 0 | Status: DISCONTINUED | OUTPATIENT
Start: 2023-08-11 | End: 2023-08-12

## 2023-08-11 RX ADMIN — AZITHROMYCIN 500 MILLIGRAM(S): 500 TABLET, FILM COATED ORAL at 21:38

## 2023-08-11 RX ADMIN — ATOVAQUONE 750 MILLIGRAM(S): 750 SUSPENSION ORAL at 05:13

## 2023-08-11 RX ADMIN — Medication 2: at 12:35

## 2023-08-11 RX ADMIN — ROSUVASTATIN CALCIUM 5 MILLIGRAM(S): 5 TABLET ORAL at 21:38

## 2023-08-11 RX ADMIN — CLOPIDOGREL BISULFATE 75 MILLIGRAM(S): 75 TABLET, FILM COATED ORAL at 09:12

## 2023-08-11 RX ADMIN — LINAGLIPTIN 5 MILLIGRAM(S): 5 TABLET, FILM COATED ORAL at 17:37

## 2023-08-11 RX ADMIN — TAMSULOSIN HYDROCHLORIDE 0.4 MILLIGRAM(S): 0.4 CAPSULE ORAL at 21:38

## 2023-08-11 RX ADMIN — ATOVAQUONE 750 MILLIGRAM(S): 750 SUSPENSION ORAL at 17:39

## 2023-08-11 RX ADMIN — Medication 1: at 08:45

## 2023-08-11 RX ADMIN — Medication 2: at 17:38

## 2023-08-11 RX ADMIN — PANTOPRAZOLE SODIUM 40 MILLIGRAM(S): 20 TABLET, DELAYED RELEASE ORAL at 05:14

## 2023-08-11 RX ADMIN — Medication 650 MILLIGRAM(S): at 01:25

## 2023-08-11 RX ADMIN — FINASTERIDE 5 MILLIGRAM(S): 5 TABLET, FILM COATED ORAL at 09:11

## 2023-08-11 RX ADMIN — Medication 3 MILLIGRAM(S): at 21:38

## 2023-08-11 NOTE — H&P ADULT - NSICDXFAMILYHX_GEN_ALL_CORE_FT
FAMILY HISTORY:  Father  Still living? Unknown  Family history of CVA, Age at diagnosis: Age Unknown  Family hx of hypertension, Age at diagnosis: Age Unknown  FH: type 2 diabetes mellitus, Age at diagnosis: Age Unknown    Mother  Still living? Unknown  Family history of CVA, Age at diagnosis: Age Unknown  Family hx of hypertension, Age at diagnosis: Age Unknown  FH: type 2 diabetes mellitus, Age at diagnosis: Age Unknown

## 2023-08-11 NOTE — H&P ADULT - HISTORY OF PRESENT ILLNESS
83 year old male PMHx significant for CAD, s/p CABG x 3, Diabetes Mellitus Type 2, Hypertension, Ulcerative Colitis, hx colon adenomas, GERD, BPH, and Iron Deficiency Anemia presents to the  for further evaluation of generalized weakness/malaise, and tiredness over the past 3 days resulting in mechanical falls. Of note per the patient's wife the patient has additionally had decreased PO intake, and subjective fevers, fell earlier in the day, and 2 days prior to arrival. Vital signs in triage => /47, HR 81/min, RR 18/min, TMax 102.2'F, SpO2 97% on room air. Labs => Hgb/Hct 10.2/29.1, , Na 127, BUN/Cr 23/1.37, Glu 229, Alb 2.5. CT Head => No acute intracranial injury. CT Cervical Spine => No cervical vertebral fracture. CT ABD/Pelvis => No evidence of acute inflammatory or obstructive process in the abdomen and pelvis. Abd US => Normal abdominal ultrasound. In the ED the patient was given Atovaquone 750mg PO x 1, Azithromycin 500mg IVPB x 1, and NS x 500mL IVPB x 1.

## 2023-08-11 NOTE — DIETITIAN INITIAL EVALUATION ADULT - PERTINENT LABORATORY DATA
08-11    132<L>  |  102  |  19  ----------------------------<  175<H>  4.3   |  24  |  1.22    Ca    8.1<L>      11 Aug 2023 06:48  Mg     2.1     08-10    TPro  6.1  /  Alb  2.5<L>  /  TBili  1.0  /  DBili  x   /  AST  19  /  ALT  23  /  AlkPhos  60  08-11  POCT Blood Glucose.: 247 mg/dL (08-11-23 @ 12:11)  A1C with Estimated Average Glucose Result: 6.8 % (08-11-23 @ 06:48)

## 2023-08-11 NOTE — H&P ADULT - NSICDXPASTMEDICALHX_GEN_ALL_CORE_FT
PAST MEDICAL HISTORY:  3-vessel CAD     BPH (benign prostatic hyperplasia)     Diabetes mellitus, type 2     GERD (gastroesophageal reflux disease)     HLD (hyperlipidemia)     HTN (hypertension)     Ulcerative colitis

## 2023-08-11 NOTE — H&P ADULT - ASSESSMENT
83 year old male PMHx significant for CAD, s/p CABG x 3, Diabetes Mellitus Type 2, Hypertension, Ulcerative Colitis, hx colon adenomas, GERD, BPH, and Iron Deficiency Anemia presents to the  for further evaluation of generalized weakness/malaise, and tiredness over the past 3 days resulting in mechanical falls. Of note per the patient's wife the patient has additionally had decreased PO intake, and subjective fevers, fell earlier in the day, and 2 days prior to arrival. Vital signs in triage => /47, HR 81/min, RR 18/min, TMax 102.2'F, SpO2 97% on room air. Labs => Hgb/Hct 10.2/29.1, , Na 127, BUN/Cr 23/1.37, Glu 229, Alb 2.5. CT Head => No acute intracranial injury. CT Cervical Spine => No cervical vertebral fracture. CT ABD/Pelvis => No evidence of acute inflammatory or obstructive process in the abdomen and pelvis. Abd US => Normal abdominal ultrasound. In the ED the patient was given Atovaquone 750mg PO x 1, Azithromycin 500mg IVPB x 1, and NS x 500mL IVPB x 1. 83 year old male PMHx significant for CAD, s/p CABG x 3, Diabetes Mellitus Type 2, Hypertension, Ulcerative Colitis, hx colon adenomas, GERD, BPH, and Iron Deficiency Anemia presents to the  for further evaluation of generalized weakness/malaise, and tiredness over the past 3 days resulting in mechanical falls. Of note per the patient's wife the patient has additionally had decreased PO intake, and subjective fevers, fell earlier in the day, and 2 days prior to arrival. Vital signs in triage => /47, HR 81/min, RR 18/min, TMax 102.2'F, SpO2 97% on room air. Labs => Hgb/Hct 10.2/29.1, , Na 127, BUN/Cr 23/1.37, Glu 229, Alb 2.5. CT Head => No acute intracranial injury. CT Cervical Spine => No cervical vertebral fracture. CT ABD/Pelvis => No evidence of acute inflammatory or obstructive process in the abdomen and pelvis. Abd US => Normal abdominal ultrasound. In the ED the patient was given Atovaquone 750mg PO x 1, Azithromycin 500mg IVPB x 1, and NS x 500mL IVPB x 1.    #Acute Babesiosis  ~admit to Medicine  ~f/u PAN C+S  ~f/u [place] Vise IgG/IgM AB Reflex  ~f/u Babesia microti PCR  ~f/u Ehrlichea/Anaplasma PCR  ~f/u w/ ID consultation in the am  ~cont. Atovaquone 750mg po q12h  ~cont. Azithromycin 500mg IVPB daily    #Hyponatremia  ~will hold Losartan (takes 100mg po daily)  ~f/u Urine studies  ~f/u serum Osmolality  ~f/u am labs     #CAD/Hx CABG  ~cont. Clopidogrel 75mg po daily  ~cont. Metoprolol ER 25mg po daily    #Hyperlipidemia  ~cont. Rosuvastatin 5mg po qhs     #Diabetes Mellitus Type 2  ~FS qAC/HS  ~cont ISS per protocol  ~cont. low carb diet    #BPH  ~cont. Finasteride 5mg po daily  ~cont. Tamsulosin 0.4mg po qhs     #Vte ppx  ~cont. SCDs for now

## 2023-08-11 NOTE — PROGRESS NOTE ADULT - NUTRITIONAL ASSESSMENT
This patient has been assessed with a concern for Malnutrition and has been determined to have a diagnosis/diagnoses of Severe protein-calorie malnutrition.    This patient is being managed with:   Diet Consistent Carbohydrate w/Evening Snack-  Entered: Aug 11 2023 10:35AM

## 2023-08-11 NOTE — PATIENT PROFILE ADULT - CAREGIVER ADDRESS
-- Message is from the [facility]--    Called and left voicemail to inform patient to call the practice site back to update insurance information below:    REG-LEFT MESSAGE FOR PARENT TO CALL BACK WITH UPDATED INS INFO. MAILBOX IS FULL    ACC AGENT: When patient calls back:  Do not transfer patient to registration.  Collect missing insurance information to get to the green checkmark and verify insurance (refer to PIE tool).          
[address] [place] [address]

## 2023-08-11 NOTE — DIETITIAN INITIAL EVALUATION ADULT - OTHER INFO
83 year old male PMHx significant for CAD, s/p CABG x 3, Diabetes Mellitus Type 2, Hypertension, Ulcerative Colitis, hx colon adenomas, GERD, BPH, and Iron Deficiency Anemia presents to the  for further evaluation of generalized weakness/malaise, and tiredness over the past 3 days resulting in mechanical falls. Of note per the patient's wife the patient has additionally had decreased PO intake, and subjective fevers, fell earlier in the day, and 2 days prior to arrival. Admitted for acute babesiosis.     Pt eating breakfast at time of RD visit, <25% consumed. Reports poor appetite currently. Appears thin, NFPE reveals moderate muscle/ fat wasting; meets criteria for PCM. Reports he has lost "a lot of weight very suddenly" but is unsure of exact amount or time frame. Reports # and believes he weighs ~150# currently. Admit wt 150#, RD took bed scale wt but pt sitting on side of bed and likely inaccurate. Unintentional wt loss of 19#/ 11.2% x unknown time frame, likely clinically significant. Currently on consistent CHO/ DASH/TLC diet; A1C 6.8%, WDL for age. 1 POCT elevated. Would suggest to Liberalize diet to regular to maximize caloric and nutrient intake. Will trial francia Auspherix shake TID 2/2 lactose intolerant and tries to avoid dairy products. She additional recommendations below.

## 2023-08-11 NOTE — PHYSICAL THERAPY INITIAL EVALUATION ADULT - PLANNED THERAPY INTERVENTIONS, PT EVAL
Stair training. Eval, transfers x 15'. Pt left sitting at EOB with nursing assistant present. Pt with no c/o pain. Will cont to follow pt and increase as tolerated./bed mobility training/gait training/transfer training

## 2023-08-11 NOTE — PROVIDER CONTACT NOTE (CRITICAL VALUE NOTIFICATION) - ACTION/TREATMENT ORDERED:
Antibiotics ordered, RN will continue to monitor
ID doctor involved, antibiotics ordered. RN will continue to monitor

## 2023-08-11 NOTE — DIETITIAN INITIAL EVALUATION ADULT - PERTINENT MEDS FT
MEDICATIONS  (STANDING):  atovaquone  Suspension 750 milliGRAM(s) Oral two times a day  azithromycin   Tablet 500 milliGRAM(s) Oral at bedtime  clopidogrel Tablet 75 milliGRAM(s) Oral daily  dextrose 5%. 1000 milliLiter(s) (50 mL/Hr) IV Continuous <Continuous>  dextrose 5%. 1000 milliLiter(s) (100 mL/Hr) IV Continuous <Continuous>  dextrose 50% Injectable 25 Gram(s) IV Push once  dextrose 50% Injectable 12.5 Gram(s) IV Push once  dextrose 50% Injectable 25 Gram(s) IV Push once  finasteride 5 milliGRAM(s) Oral daily  glucagon  Injectable 1 milliGRAM(s) IntraMuscular once  insulin lispro (ADMELOG) corrective regimen sliding scale   SubCutaneous three times a day before meals  insulin lispro (ADMELOG) corrective regimen sliding scale   SubCutaneous at bedtime  losartan 100 milliGRAM(s) Oral daily  metoprolol succinate ER 25 milliGRAM(s) Oral daily  pantoprazole    Tablet 40 milliGRAM(s) Oral before breakfast  rosuvastatin 5 milliGRAM(s) Oral at bedtime  tamsulosin 0.4 milliGRAM(s) Oral at bedtime    MEDICATIONS  (PRN):  acetaminophen     Tablet .. 650 milliGRAM(s) Oral every 6 hours PRN Temp greater or equal to 38C (100.4F), Mild Pain (1 - 3)  aluminum hydroxide/magnesium hydroxide/simethicone Suspension 30 milliLiter(s) Oral every 4 hours PRN Dyspepsia  dextrose Oral Gel 15 Gram(s) Oral once PRN Blood Glucose LESS THAN 70 milliGRAM(s)/deciliter  melatonin 3 milliGRAM(s) Oral at bedtime PRN Insomnia  ondansetron Injectable 4 milliGRAM(s) IV Push every 8 hours PRN Nausea and/or Vomiting

## 2023-08-11 NOTE — PATIENT PROFILE ADULT - FALL HARM RISK - HARM RISK INTERVENTIONS
Assistance with ambulation/Assistance OOB with selected safe patient handling equipment/Communicate Risk of Fall with Harm to all staff/Discuss with provider need for PT consult/Monitor gait and stability/Reinforce activity limits and safety measures with patient and family/Tailored Fall Risk Interventions/Toileting schedule using arm’s reach rule for commode and bathroom/Visual Cue: Yellow wristband and red socks/Bed in lowest position, wheels locked, appropriate side rails in place/Call bell, personal items and telephone in reach/Instruct patient to call for assistance before getting out of bed or chair/Non-slip footwear when patient is out of bed/[place] to call system/Physically safe environment - no spills, clutter or unnecessary equipment/Purposeful Proactive Rounding/Room/bathroom lighting operational, light cord in reach

## 2023-08-11 NOTE — CONSULT NOTE ADULT - SUBJECTIVE AND OBJECTIVE BOX
Patient is a 83y old  Male who presents with a chief complaint of Recurrent Falls, Generalized Malaise (11 Aug 2023 02:25)    HPI:  83 year old male PMHx significant for CAD, s/p CABG x 3, Diabetes Mellitus Type 2, Hypertension, Ulcerative Colitis, hx colon adenomas, GERD, BPH, and Iron Deficiency Anemia presents to the  for further evaluation of generalized weakness/malaise, and tiredness over the past 3 days resulting in mechanical falls. Of note per the patient's wife the patient has additionally had decreased PO intake, and subjective fevers, fell earlier in the day, and 2 days prior to arrival. Vital signs in triage => /47, HR 81/min, RR 18/min, TMax 102.2'F, SpO2 97% on room air. Labs => Hgb/Hct 10.2/29.1, , Na 127, BUN/Cr 23/1.37, Glu 229, Alb 2.5. CT Head => No acute intracranial injury. CT Cervical Spine => No cervical vertebral fracture. CT ABD/Pelvis => No evidence of acute inflammatory or obstructive process in the abdomen and pelvis. Abd US => Normal abdominal ultrasound. In the ED the patient was given Atovaquone 750mg PO x 1, Azithromycin 500mg IVPB x 1, and NS x 500mL IVPB x 1.       PMH: as above  PSH: as above  Meds: per reconciliation sheet, noted below  MEDICATIONS  (STANDING):  atovaquone  Suspension 750 milliGRAM(s) Oral two times a day  azithromycin   Tablet 500 milliGRAM(s) Oral at bedtime  clopidogrel Tablet 75 milliGRAM(s) Oral daily  dextrose 5%. 1000 milliLiter(s) (50 mL/Hr) IV Continuous <Continuous>  dextrose 5%. 1000 milliLiter(s) (100 mL/Hr) IV Continuous <Continuous>  dextrose 50% Injectable 25 Gram(s) IV Push once  dextrose 50% Injectable 12.5 Gram(s) IV Push once  dextrose 50% Injectable 25 Gram(s) IV Push once  finasteride 5 milliGRAM(s) Oral daily  glucagon  Injectable 1 milliGRAM(s) IntraMuscular once  insulin glargine Injectable (LANTUS) 10 Unit(s) SubCutaneous at bedtime  insulin lispro (ADMELOG) corrective regimen sliding scale   SubCutaneous at bedtime  insulin lispro (ADMELOG) corrective regimen sliding scale   SubCutaneous three times a day before meals  insulin lispro Injectable (ADMELOG) 3 Unit(s) SubCutaneous three times a day before meals  losartan 100 milliGRAM(s) Oral daily  metoprolol succinate ER 25 milliGRAM(s) Oral daily  pantoprazole    Tablet 40 milliGRAM(s) Oral before breakfast  rosuvastatin 5 milliGRAM(s) Oral at bedtime  tamsulosin 0.4 milliGRAM(s) Oral at bedtime      Allergies    CT Dye (Unknown)  No Known Drug Allergies    Intolerances      Social: no smoking, no alcohol, no illegal drugs; no recent travel, no exposure to TB  FAMILY HISTORY:  Family history of CVA (Father, Mother)    FH: type 2 diabetes mellitus (Father, Mother)    Family hx of hypertension (Father, Mother)       no history of premature cardiovascular disease in first degree relatives    ROS: +fever, chills, no HA, no dizziness, no sore throat, no blurry vision, no CP, no palpitations, no SOB, no cough, no abdominal pain, no diarrhea, no N/V, no dysuria, no leg pain, no claudication, no rash, no joint aches, no rectal pain or bleeding, no night sweats    All other systems reviewed and are negative    Vital Signs Last 24 Hrs  T(C): 37.3 (11 Aug 2023 08:05), Max: 39 (11 Aug 2023 01:17)  T(F): 99.1 (11 Aug 2023 08:05), Max: 102.2 (11 Aug 2023 01:17)  HR: 67 (11 Aug 2023 09:15) (67 - 81)  BP: 98/68 (11 Aug 2023 09:15) (98/68 - 150/88)  BP(mean): 59 (10 Aug 2023 18:55) (59 - 59)  RR: 18 (11 Aug 2023 08:05) (16 - 18)  SpO2: 98% (11 Aug 2023 08:05) (97% - 99%)    Parameters below as of 11 Aug 2023 08:05  Patient On (Oxygen Delivery Method): room air      Daily Height in cm: 177.8 (10 Aug 2023 18:53)    Daily Weight in k.2 (11 Aug 2023 02:01)    PE:  Constitutional: frail looking  HEENT: NC/AT, EOMI, PERRLA, conjunctivae clear; ears and nose atraumatic; pharynx benign  Neck: supple; thyroid not palpable  Back: no tenderness  Respiratory: respiratory effort normal; clear to auscultation  Cardiovascular: S1S2 regular, no murmurs  Abdomen: soft, not tender, not distended, positive BS; liver and spleen WNL  Genitourinary: no suprapubic tenderness  Lymphatic: no LN palpable  Musculoskeletal: no muscle tenderness, no joint swelling or tenderness  Extremities: no pedal edema  Neurological/ Psychiatric: AxOx3, Judgement and insight normal;  moving all extremities  Skin: no rashes; no palpable lesions    Labs: all available labs reviewed                        10.2   .  )-----------( 128      ( 11 Aug 2023 06:48 )             29.3     08-11    132<L>  |  102  |  19  ----------------------------<  175<H>  4.3   |  24  |  1.22    Ca    8.1<L>      11 Aug 2023 06:48  Mg     2.1     08-10    TPro  6.1  /  Alb  2.5<L>  /  TBili  1.0  /  DBili  x   /  AST  19  /  ALT  23  /  AlkPhos  60  08-11     LIVER FUNCTIONS - ( 11 Aug 2023 06:48 )  Alb: 2.5 g/dL / Pro: 6.1 gm/dL / ALK PHOS: 60 U/L / ALT: 23 U/L / AST: 19 U/L / GGT: x           Urinalysis Basic - ( 11 Aug 2023 06:48 )    Color: x / Appearance: x / SG: x / pH: x  Gluc: 175 mg/dL / Ketone: x  / Bili: x / Urobili: x   Blood: x / Protein: x / Nitrite: x   Leuk Esterase: x / RBC: x / WBC x   Sq Epi: x / Non Sq Epi: x / Bacteria: x        Culture Results:   NEGATIVE for Plasmodium antigens. Microscopy is performed for  confirmation.  The Rapid Antigen test does not detect the presence of  Babesia species. If Babesiosis is suspected, please order  test Babesia PCR: Babesia microti PCR Bld  ************************************************************  Babesia species  by Giemsa Stain  Parasitemia = <1% (08-10 @ 20:07)    Radiology: all available radiological tests reviewed  < from: Xray Chest 1 View AP/PA. (08.10.23 @ 19:23) >    ACC: [id number] EXAM:  XR CHEST 1 VIEW   ORDERED BY: ADDIS JONES     ACC: [id number] EXAM:  XR PELVIS AP ONLY 1-2 VIEWS   ORDERED BY:   ADDIS JONES     PROCEDURE DATE:  08/10/2023          INTERPRETATION:  Pelvis and chest. Patient had a fall.    Pelvis.    Hips are relatively free of degeneration and are symmetric.    There is some lower lumbar facet degeneration.    No bone destruction or fracture. Clips are seen in the upper medial left   thigh.    Findings are similar to CAT scan of  this year.    AP chest on August 10, 2023 at 7:17 PM.    COMPARISON: None available.    Heart magnified by technique. Sternotomy is noted.    There are slightly increased markings at left base.    Right thoracic curve seen fracture.    IMPRESSION: No fracture. Slight increased markings left base. Sternotomy.    --- End of Report ---      < end of copied text >    Advanced directives addressed: full resuscitation

## 2023-08-11 NOTE — DIETITIAN INITIAL EVALUATION ADULT - NSFNSGIIOFT_GEN_A_CORE
I&O's Detail    10 Aug 2023 07:01  -  11 Aug 2023 07:00  --------------------------------------------------------  IN:  Total IN: 0 mL    OUT:    Voided (mL): 500 mL  Total OUT: 500 mL    Total NET: -500 mL

## 2023-08-11 NOTE — DIETITIAN INITIAL EVALUATION ADULT - ORAL INTAKE PTA/DIET HISTORY
states he eats small meals, 3 per day. Chinese-based foods. Also seems to follow a lot of cultural Chinese practices. Reports diarrhea every day  when eats dairy products. ? lactose intolerance   reports relief from lactacid pills  Lives with wife who cooks shops for pt

## 2023-08-11 NOTE — PHARMACOTHERAPY INTERVENTION NOTE - COMMENTS
83y male admitted with Hyponatremia, hypo-osmolarity, or hypo-osmolar hyponatremia    HPI:  83 year old male with a PMHx significant for CAD s/p CABG x 3, Diabetes Mellitus Type 2, Hypertension, Ulcerative Colitis, hx colon adenomas, GERD, BPH, and Iron Deficiency Anemia presents for generalized weakness/malaise over the past 3 days resulting in mechanical falls. He has had decreased PO intake, and subjective fevers, fell earlier in the day, and 2 days prior to arrival. Vital signs in triage => /47, HR 81/min, RR 18/min, TMax 102.2'F, SpO2 97% on room air. Labs => Hgb/Hct 10.2/29.1, , Na 127, BUN/Cr 23/1.37, Glu 229, Alb 2.5. CT Head => No acute intracranial injury. CT Cervical Spine => No cervical vertebral fracture. CT ABD/Pelvis => No evidence of acute inflammatory or obstructive process in the abdomen and pelvis. Abd US => Normal abdominal ultrasound. In the ED the patient was given Atovaquone 750mg PO x 1, Azithromycin 500mg IVPB x 1, and NS x 500mL IVPB x 1. (11 Aug 2023 02:25)    Pertinent PMH/PSH  3-vessel CAD S/P CABG x 3  HTN (hypertension)  HLD (hyperlipidemia)  Diabetes mellitus, type 2  BPH (benign prostatic hyperplasia)  GERD (gastroesophageal reflux disease)  Ulcerative colitis    Home Medications: Incomplete  clopidogrel 75 mg oral tablet: 1 tab(s) orally once a day (11 Aug 2023 01:24)  finasteride 5 mg oral tablet: 1 tab(s) orally once a day (11 Aug 2023 01:22)  glipiZIDE 5 mg oral tablet: 1 tab(s) orally 2 times a day (11 Aug 2023 01:25)  losartan 100 mg oral tablet: 1 tab(s) orally once a day (11 Aug 2023 01:25)  metFORMIN 1000 mg oral tablet: 1 tab(s) orally 2 times a day (11 Aug 2023 01:24)  metoprolol succinate 25 mg oral tablet, extended release: 1 tab(s) orally once a day (11 Aug 2023 01:23)  omeprazole 40 mg oral delayed release capsule: 1 cap(s) orally once a day (11 Aug 2023 01:21)  rosuvastatin 5 mg oral tablet: 1 tab(s) orally once a day (at bedtime) (11 Aug 2023 01:26)  tamsulosin 0.4 mg oral capsule: 1 cap(s) orally once a day (at bedtime) (11 Aug 2023 01:23)    A1C Result(s) Within Prior 3 Months: Pending    Assessment/Plan:  Pending 83y male admitted with Hyponatremia, hypo-osmolarity, or hypo-osmolar hyponatremia    HPI:  83 year old male with a PMHx significant for CAD s/p CABG x 3, Diabetes Mellitus Type 2, Hypertension, Ulcerative Colitis, hx colon adenomas, GERD, BPH, and Iron Deficiency Anemia presents for generalized weakness/malaise over the past 3 days resulting in mechanical falls. He has had decreased PO intake, and subjective fevers, fell earlier in the day, and 2 days prior to arrival. Vital signs in triage => /47, HR 81/min, RR 18/min, TMax 102.2'F, SpO2 97% on room air. Labs => Hgb/Hct 10.2/29.1, , Na 127, BUN/Cr 23/1.37, Glu 229, Alb 2.5. CT Head => No acute intracranial injury. CT Cervical Spine => No cervical vertebral fracture. CT ABD/Pelvis => No evidence of acute inflammatory or obstructive process in the abdomen and pelvis. Abd US => Normal abdominal ultrasound. In the ED the patient was given Atovaquone 750mg PO x 1, Azithromycin 500mg IVPB x 1, and NS x 500mL IVPB x 1. (11 Aug 2023 02:25)    Pertinent PMH/PSH  3-vessel CAD S/P CABG x 3  HTN (hypertension)  HLD (hyperlipidemia)  Diabetes mellitus, type 2  BPH (benign prostatic hyperplasia)  GERD (gastroesophageal reflux disease)  Ulcerative colitis    Home Medications:   clopidogrel 75 mg oral tablet: 1 tab(s) orally once a day (11 Aug 2023 01:24)  finasteride 5 mg oral tablet: 1 tab(s) orally once a day (11 Aug 2023 01:22)  glipiZIDE 5 mg oral tablet: 1 tab(s) orally 2 times a day (11 Aug 2023 01:25)  losartan 100 mg oral tablet: 1 tab(s) orally once a day (11 Aug 2023 01:25)  metFORMIN 1000 mg oral tablet: 1 tab(s) orally 2 times a day (11 Aug 2023 01:24)  metoprolol succinate 25 mg oral tablet, extended release: 1 tab(s) orally once a day (11 Aug 2023 01:23)  omeprazole 40 mg oral delayed release capsule: 1 cap(s) orally once a day (11 Aug 2023 01:21)  rosuvastatin 5 mg oral tablet: 1 tab(s) orally once a day (at bedtime) (11 Aug 2023 01:26)  tamsulosin 0.4 mg oral capsule: 1 cap(s) orally once a day (at bedtime) (11 Aug 2023 01:23)    A1C Result(s) Within Prior 3 Months: 6.8    Renal Function Within Prior 72 Hours  Creatinine: 1.37 mg/dL (08-10-23 @ 20:19)  Creatinine: 1.22 mg/dL (08-11-23 @ 06:48)    Current Orders  insulin glargine Injectable (LANTUS) 10 Unit(s) SubCutaneous at bedtime  insulin lispro (ADMELOG) corrective regimen sliding scale   SubCutaneous three times a day before meals  insulin lispro (ADMELOG) corrective regimen sliding scale   SubCutaneous at bedtime  insulin lispro Injectable (ADMELOG) 3 Unit(s) SubCutaneous three times a day before meals    POCT Within Prior 24 Hours  POCT Blood Glucose.: 163 mg/dL (08-11-23 @ 08:15)  POCT Blood Glucose.: 247 mg/dL (08-11-23 @ 12:11)    Insulin Administration Within Prior 24 Hours  insulin lispro (ADMELOG) corrective regimen sliding scale   1 Unit(s) SubCutaneous (08-11-23 @ 08:45)   2 Unit(s) SubCutaneous (08-11-23 @ 12:35)    Other Administration(s) (i.e. Steroids, PO diabetes medications) Within Prior 24 Hours  azithromycin  IVPB in 250 mL of D5W (08-10-23 @ 22:42) *Changed to PO*    Height (cm): 177.8 (08-10-23 @ 18:53)  Weight (kg): 68 (08-10-23 @ 18:53)  BMI (kg/m2): 21.5 (08-10-23 @ 18:53)    Current Diet  Diet, Consistent Carbohydrate w/Evening Snack (08-11-23 @ 10:35) [Active]    Assessment/Plan:  - Patient with a history of Type 2 Diabetes Mellitus: Glycemic control to be managed by Inpatient Diabetes Management Team  - A1C is 6.8%: Patients treatment goal is A1C < 8.0% due to age and the presence of multiple comorbid conditions per the 2023 ADA standards and thus patient has acceptable glycemic control outpatient, currently on glipiZIDE 5 mg PO BID and metFORMIN 1000 mg PO BID [eGFR 60]  - Patient is insulin naive, currently ordered a low intensity insulin correction scale TID AC and HS  - Fasting POCT 163 [Within protocol range] and pre-lunch POCT 247 [Above protocol range]  - Patient was receiving azithromycin 500 mg IVPB in 250 mL D5W, which was changed to PO and on a regular diet, which was changed to a consistent carbohydrate diet this morning  - Will start Tradjenta 5 mg PO QD, continue low intensity insulin correction scale, and titrate therapy to glycemic POCT target 100-180

## 2023-08-11 NOTE — H&P ADULT - NSHPPHYSICALEXAM_GEN_ALL_CORE
Vital Signs Last 24 Hrs  T(C): 38.5 (11 Aug 2023 02:01), Max: 39 (11 Aug 2023 01:17)  T(F): 101.3 (11 Aug 2023 02:01), Max: 102.2 (11 Aug 2023 01:17)  HR: 70 (11 Aug 2023 02:01) (70 - 81)  BP: 144/61 (11 Aug 2023 02:01) (101/47 - 150/88)  BP(mean): 59 (10 Aug 2023 18:55) (59 - 59)  RR: 17 (11 Aug 2023 02:01) (16 - 18)  SpO2: 99% (11 Aug 2023 02:01) (97% - 99%)    Parameters below as of 11 Aug 2023 02:01  Patient On (Oxygen Delivery Method): room air

## 2023-08-11 NOTE — PHYSICAL THERAPY INITIAL EVALUATION ADULT - GAIT TRAINING, PT EVAL
By D/C: Pt will amb without device 300' with independence. By D/C: Pt will ascend/descend 5 steps with HR, step to gait pattern, and independence.

## 2023-08-11 NOTE — PROGRESS NOTE ADULT - SUBJECTIVE AND OBJECTIVE BOX
· Assessment	  83 year old male PMHx significant for CAD, s/p CABG x 3, Diabetes Mellitus Type 2, Hypertension, Ulcerative Colitis, hx colon adenomas, GERD, BPH, and Iron Deficiency Anemia presents to the  for further evaluation of generalized weakness/malaise, and tiredness over the past 3 days resulting in mechanical falls. Of note per the patient's wife the patient has additionally had decreased PO intake, and subjective fevers, fell earlier in the day, and 2 days prior to arrival. Vital signs in triage => /47, HR 81/min, RR 18/min, TMax 102.2'F, SpO2 97% on room air. Labs => Hgb/Hct 10.2/29.1, , Na 127, BUN/Cr 23/1.37, Glu 229, Alb 2.5. CT Head => No acute intracranial injury. CT Cervical Spine => No cervical vertebral fracture. CT ABD/Pelvis => No evidence of acute inflammatory or obstructive process in the abdomen and pelvis. Abd US => Normal abdominal ultrasound. In the ED the patient was given Atovaquone 750mg PO x 1, Azithromycin 500mg IVPB x 1, and NS x 500mL IVPB x 1.    #Acute Babesiosis  - case d/w ID.   ~admit to Medicine  ~f/u PAN C+S  ~f/u Lyme Vise IgG/IgM AB Reflex  ~f/u Babesia microti PCR  ~f/u Ehrlichea/Anaplasma PCR  ~cont. Atovaquone 750mg po q12h  ~cont. Azithromycin 500mg IVPB daily    #Hyponatremia - improving. asymptomatic   ~will hold Losartan (takes 100mg po daily)  ~f/u Urine studies  ~f/u serum Osmolality  ~f/u am labs     #CAD/Hx CABG  ~cont. Clopidogrel 75mg po daily  ~cont. Metoprolol ER 25mg po daily    #Hyperlipidemia  ~cont. Rosuvastatin 5mg po qhs     #Diabetes Mellitus Type 2  ~FS qAC/HS  ~cont ISS per protocol  ~cont. low carb diet    #BPH  ~cont. Finasteride 5mg po daily  ~cont. Tamsulosin 0.4mg po qhs     #Vte ppx  ~cont. SCDs for now     dispo - likely dc in AM if improving

## 2023-08-11 NOTE — PROVIDER CONTACT NOTE (CRITICAL VALUE NOTIFICATION) - ASSESSMENT
Patient vitals stable, afebrile, some fatigue endorsed.
Patient vitals stable, afebrile, endorses fatigue

## 2023-08-11 NOTE — PATIENT PROFILE ADULT - FUNCTIONAL ASSESSMENT - BASIC MOBILITY 6.
2-calculated by average/Not able to assess (calculate score using [facility] averaging method)

## 2023-08-11 NOTE — DIETITIAN INITIAL EVALUATION ADULT - FUNCTIONAL SCREEN CURRENT LEVEL: SWALLOWING (IF SCORE 2 OR MORE FOR ANY ITEM, CONSULT REHAB SERVICES), MLM)
[facility] Cardiothoracic Surgery - HOSPITAL DISCHARGE FOLLOW-UP    Contacted Ms. Hutson regarding follow-up after hospital discharge. She was discharged from the hospital on 10/22/20.     1. Patient is wearing GRACIE stockings as directed: Yes    2. Patient is ambulating: Yes   How often: several times a day   How far: 300 ft    3. Binder usage: N/A    4. IS usage 3-5 times per day minimum: Yes and patient is able to get I.S. Up to:[id number]     5. Taking medications as prescribed? Yes    6. Any issues with meds? No    7. Pain control? Good pain control    8. How much pain meds used each day? Oxycodone at night if needed, not using tylenol, \"don't need it\"      Last BM? daily    9. Incision:   Dressing off? Yes   Clean? Yes   Dry? Yes   Staples/Sutures Intact? Yes, chest tube doing well emptying as directed, maybe 10 cc at a time    10. Leg wounds? (CABG only): n/a    11. Appetite? Good    12. Are they sleeping?  yes    13.  Daily Showers?  Yes    14. Follow up appointment scheduled for 10/28/20  at 0900 with Matty Cody PA-C.  Labs and xray are to be done 45 min [facility]  Pt verbalizes appt date and times.     0 = swallows foods/liquids without difficulty

## 2023-08-11 NOTE — DIETITIAN INITIAL EVALUATION ADULT - ADD RECOMMEND
1) Liberalize diet to regular to maximize caloric and nutrient intake; monitor POCT and maintain between 140- 180 mg/dL, 2) [employer] TID to optimize nutritional needs (provides 325 kcal, 16 g protein/ shake), 3) Encourage protein-rich foods, maximize food preferences, 4) MVI w/ minerals daily to ensure 100% RDA met, 5) Consider adding thiamine 100 mg daily 2/2 poor PO intake/ malnutrition, 6) Monitor bowel movements, if no BM for >3 days, consider implementing bowel regimen, 7) Confirm goals of care regarding nutrition support, 8) Consider adding appetite stimulant such as Remeron or Marinol 2/2 poor appetite/ PO intake. RD will continue to monitor PO intake, labs, hydration, and wt prn.

## 2023-08-11 NOTE — CONSULT NOTE ADULT - ASSESSMENT
83 year old male PMHx significant for CAD, s/p CABG x 3, Diabetes Mellitus Type 2, Hypertension, Ulcerative Colitis, hx colon adenomas, GERD, BPH, and Iron Deficiency Anemia presents to the  for further evaluation of generalized weakness/malaise, and tiredness over the past 3 days resulting in mechanical falls. Of note per the patient's wife the patient has additionally had decreased PO intake, and subjective fevers, fell earlier in the day, and 2 days prior to arrival. Vital signs in triage => /47, HR 81/min, RR 18/min, TMax 102.2'F, SpO2 97% on room air. Labs => Hgb/Hct 10.2/29.1, , Na 127, BUN/Cr 23/1.37, Glu 229, Alb 2.5. CT Head => No acute intracranial injury. CT Cervical Spine => No cervical vertebral fracture. CT ABD/Pelvis => No evidence of acute inflammatory or obstructive process in the abdomen and pelvis. Abd US => Normal abdominal ultrasound. In the ED the patient was given Atovaquone 750mg PO x 1, Azithromycin 500mg IVPB x 1, and NS x 500mL IVPB x 1.     1. Fever. Tick borne illness. Babesiosis  - agree with mepron 750mg BID   - on azithromycin 500mg daily   - complete 10 day course  - f/u lyme screen, ehrlichia, anaplasma pcr r/o co infection  - monitor temps  - tolerating abx well so far; no side effects noted  - reason for abx use and side effects reviewed with patient  - supportive care  - fu cbc    2. other issues - care per medicine

## 2023-08-12 ENCOUNTER — TRANSCRIPTION ENCOUNTER (OUTPATIENT)
Age: 83
End: 2023-08-12

## 2023-08-12 VITALS
HEART RATE: 65 BPM | RESPIRATION RATE: 18 BRPM | SYSTOLIC BLOOD PRESSURE: 133 MMHG | TEMPERATURE: 98 F | DIASTOLIC BLOOD PRESSURE: 55 MMHG | OXYGEN SATURATION: 98 %

## 2023-08-12 LAB
ANION GAP SERPL CALC-SCNC: 6 MMOL/L — SIGNIFICANT CHANGE UP (ref 5–17)
BUN SERPL-MCNC: 22 MG/DL — SIGNIFICANT CHANGE UP (ref 7–23)
CALCIUM SERPL-MCNC: 8.1 MG/DL — LOW (ref 8.5–10.1)
CHLORIDE SERPL-SCNC: 102 MMOL/L — SIGNIFICANT CHANGE UP (ref 96–108)
CO2 SERPL-SCNC: 22 MMOL/L — SIGNIFICANT CHANGE UP (ref 22–31)
CREAT SERPL-MCNC: 1.2 MG/DL — SIGNIFICANT CHANGE UP (ref 0.5–1.3)
EGFR: 61 ML/MIN/1.73M2 — SIGNIFICANT CHANGE UP
GLUCOSE BLDC GLUCOMTR-MCNC: 191 MG/DL — HIGH (ref 70–99)
GLUCOSE SERPL-MCNC: 188 MG/DL — HIGH (ref 70–99)
OSMOLALITY SERPL: 275 MOSMOL/KG — LOW (ref 280–301)
POTASSIUM SERPL-MCNC: 4.2 MMOL/L — SIGNIFICANT CHANGE UP (ref 3.5–5.3)
POTASSIUM SERPL-SCNC: 4.2 MMOL/L — SIGNIFICANT CHANGE UP (ref 3.5–5.3)
SODIUM SERPL-SCNC: 130 MMOL/L — LOW (ref 135–145)

## 2023-08-12 PROCEDURE — 99239 HOSP IP/OBS DSCHRG MGMT >30: CPT

## 2023-08-12 RX ORDER — AZITHROMYCIN 500 MG/1
1 TABLET, FILM COATED ORAL
Qty: 8 | Refills: 0
Start: 2023-08-12 | End: 2023-08-19

## 2023-08-12 RX ORDER — ATOVAQUONE 750 MG/5ML
5 SUSPENSION ORAL
Qty: 80 | Refills: 0
Start: 2023-08-12 | End: 2023-08-19

## 2023-08-12 RX ADMIN — CLOPIDOGREL BISULFATE 75 MILLIGRAM(S): 75 TABLET, FILM COATED ORAL at 08:59

## 2023-08-12 RX ADMIN — LOSARTAN POTASSIUM 100 MILLIGRAM(S): 100 TABLET, FILM COATED ORAL at 09:39

## 2023-08-12 RX ADMIN — LINAGLIPTIN 5 MILLIGRAM(S): 5 TABLET, FILM COATED ORAL at 08:59

## 2023-08-12 RX ADMIN — PANTOPRAZOLE SODIUM 40 MILLIGRAM(S): 20 TABLET, DELAYED RELEASE ORAL at 05:30

## 2023-08-12 RX ADMIN — ATOVAQUONE 750 MILLIGRAM(S): 750 SUSPENSION ORAL at 05:30

## 2023-08-12 RX ADMIN — Medication 25 MILLIGRAM(S): at 08:59

## 2023-08-12 RX ADMIN — FINASTERIDE 5 MILLIGRAM(S): 5 TABLET, FILM COATED ORAL at 08:59

## 2023-08-12 RX ADMIN — Medication 1: at 08:46

## 2023-08-12 NOTE — DISCHARGE NOTE NURSING/CASE MANAGEMENT/SOCIAL WORK - NSDCPEFALRISK_GEN_ALL_CORE
For information on Fall & Injury Prevention, visit: [email or web address] OR  [email or web address] OR  https://www.cdc.gov/steadi/patient.html

## 2023-08-12 NOTE — DISCHARGE NOTE PROVIDER - NSDCCPCAREPLAN_GEN_ALL_CORE_FT
PRINCIPAL DISCHARGE DIAGNOSIS  Diagnosis: Babesiosis, unspecified  Assessment and Plan of Treatment: complete course of antibiotics  see your [facility]irm[facility] doctor within 7 days   return to ER if unable to ambulate or worsening fevers.  low grade fevers are expected next few days as infeciton clears      SECONDARY DISCHARGE DIAGNOSES  Diagnosis: Babesiosis, unspecified  Assessment and Plan of Treatment:

## 2023-08-12 NOTE — DISCHARGE NOTE PROVIDER - NSDCMRMEDTOKEN_GEN_ALL_CORE_FT
clopidogrel 75 mg oral tablet: 1 tab(s) orally once a day  finasteride 5 mg oral tablet: 1 tab(s) orally once a day  glipiZIDE 5 mg oral tablet: 1 tab(s) orally 2 times a day  losartan 100 mg oral tablet: 1 tab(s) orally once a day  metFORMIN 1000 mg oral tablet: 1 tab(s) orally 2 times a day  metoprolol succinate 25 mg oral tablet, extended release: 1 tab(s) orally once a day  omeprazole 40 mg oral delayed release capsule: 1 cap(s) orally once a day  rosuvastatin 5 mg oral tablet: 1 tab(s) orally once a day (at bedtime)  tamsulosin 0.4 mg oral capsule: 1 cap(s) orally once a day (at bedtime)   atovaquone 750 mg/5 mL oral suspension: 5 milliliter(s) orally 2 times a day  azithromycin 500 mg oral tablet: 1 tab(s) orally once a day  clopidogrel 75 mg oral tablet: 1 tab(s) orally once a day  finasteride 5 mg oral tablet: 1 tab(s) orally once a day  glipiZIDE 5 mg oral tablet: 1 tab(s) orally 2 times a day  losartan 100 mg oral tablet: 1 tab(s) orally once a day  metFORMIN 1000 mg oral tablet: 1 tab(s) orally 2 times a day  metoprolol succinate 25 mg oral tablet, extended release: 1 tab(s) orally once a day  omeprazole 40 mg oral delayed release capsule: 1 cap(s) orally once a day  rosuvastatin 5 mg oral tablet: 1 tab(s) orally once a day (at bedtime)  tamsulosin 0.4 mg oral capsule: 1 cap(s) orally once a day (at bedtime)

## 2023-08-12 NOTE — DISCHARGE NOTE PROVIDER - HOSPITAL COURSE
83 year old male PMHx significant for CAD, s/p CABG x 3, Diabetes Mellitus Type 2, Hypertension, Ulcerative Colitis, hx colon adenomas, GERD, BPH, and Iron Deficiency Anemia presents to the  for further evaluation of generalized weakness/malaise, and tiredness over the past 3 days resulting in mechanical falls. Of note per the patient's wife the patient has additionally had decreased PO intake, and subjective fevers, fell earlier in the day, and 2 days prior to arrival. Vital signs in triage => /47, HR 81/min, RR 18/min, TMax 102.2'F, SpO2 97% on room air. Labs => Hgb/Hct 10.2/29.1, , Na 127, BUN/Cr 23/1.37, Glu 229, Alb 2.5. CT Head => No acute intracranial injury. CT Cervical Spine => No cervical vertebral fracture. CT ABD/Pelvis => No evidence of acute inflammatory or obstructive process in the abdomen and pelvis. Abd US => Normal abdominal ultrasound. In the ED the patient was given Atovaquone 750mg PO x 1, Azithromycin 500mg IVPB x 1, and NS x 500mL IVPB x 1.    pt seen by ID for acute babesiosis and now patient feeling better with fevers improving.  will dc on po abx as per ID recs    #Acute Babesiosis  - dc as per ID recs   ~cont. Atovaquone 750mg po q12h  ~cont. Azithromycin 500mg IVPB daily    #Hyponatremia - improving. asymptomatic   resume home meds     #CAD/Hx CABG  ~cont. Clopidogrel 75mg po daily  ~cont. Metoprolol ER 25mg po daily    #Hyperlipidemia  ~cont. Rosuvastatin 5mg po qhs     #Diabetes Mellitus Type 2  resume home meds    #BPH  ~cont. Finasteride 5mg po daily  ~cont. Tamsulosin 0.4mg po qhs     #Vte ppx  ~cont. SCDs for now     dc home   time spent 35 mins   83 year old male PMHx significant for CAD, s/p CABG x 3, Diabetes Mellitus Type 2, Hypertension, Ulcerative Colitis, hx colon adenomas, GERD, BPH, and Iron Deficiency Anemia presents to the  for further evaluation of generalized weakness/malaise, and tiredness over the past 3 days resulting in mechanical falls. Of note per the patient's wife the patient has additionally had decreased PO intake, and subjective fevers, fell earlier in the day, and 2 days prior to arrival. Vital signs in triage => /47, HR 81/min, RR 18/min, TMax 102.2'F, SpO2 97% on room air. Labs => Hgb/Hct 10.2/29.1, , Na 127, BUN/Cr 23/1.37, Glu 229, Alb 2.5. CT Head => No acute intracranial injury. CT Cervical Spine => No cervical vertebral fracture. CT ABD/Pelvis => No evidence of acute inflammatory or obstructive process in the abdomen and pelvis. Abd US => Normal abdominal ultrasound. In the ED the patient was given Atovaquone 750mg PO x 1, Azithromycin 500mg IVPB x 1, and NS x 500mL IVPB x 1.    pt seen by ID for acute babesiosis and now patient feeling better with fevers improving.  will dc on po abx as per ID recs    #Acute Babesiosis  - dc as per ID recs   ~cont. Atovaquone 750mg po q12h and Azithromycin 500mg po daily  - lyme negative.  - other pending tick serologies will be followed up and will notify patient if positive.    #Hyponatremia - improving. asymptomatic   resume home meds     #CAD/Hx CABG  ~cont. Clopidogrel 75mg po daily  ~cont. Metoprolol ER 25mg po daily    #Hyperlipidemia  ~cont. Rosuvastatin 5mg po qhs     #Diabetes Mellitus Type 2  resume home meds    #BPH  ~cont. Finasteride 5mg po daily  ~cont. Tamsulosin 0.4mg po qhs     #Vte ppx  ~cont. SCDs for now     dc home   time spent 35 mins

## 2023-08-12 NOTE — DISCHARGE NOTE NURSING/CASE MANAGEMENT/SOCIAL WORK - PATIENT PORTAL LINK FT
You can access the FollowMyHealth Patient Portal offered by [facility] by registering at the following website: [email or web address]. By joining [employer]’s FollowMyHealth portal, you will also be able to view your health information using other applications (apps) compatible with our system.

## 2023-08-12 NOTE — DISCHARGE NOTE PROVIDER - CARE PROVIDER_API CALL
Rosita Maher  [facility]  [address] [place] [id number]  Phone: [phone]  Fax: [phone]  Follow Up Time:

## 2023-08-12 NOTE — DISCHARGE NOTE PROVIDER - NSDCFUSCHEDAPPT_GEN_ALL_CORE_FT
Palla, Venugopal R  [facility] Physician [facility]  CARDIOLOGY [address]  Scheduled Appointment: 08/31/2023    Tee Kirkpatrick  [facility] Physician [facility]  GASTRO [address]  Scheduled Appointment: 10/17/2023

## 2023-08-12 NOTE — DISCHARGE NOTE PROVIDER - DETAILS OF MALNUTRITION DIAGNOSIS/DIAGNOSES
This patient has been assessed with a concern for Malnutrition and was treated during this hospitalization for the following Nutrition diagnosis/diagnoses:     -  08/11/2023: Severe protein-calorie malnutrition

## 2023-08-14 ENCOUNTER — APPOINTMENT (OUTPATIENT)
Dept: UROLOGY | Facility: CLINIC | Age: 83
End: 2023-08-14

## 2023-08-14 PROBLEM — E11.9 TYPE 2 DIABETES MELLITUS WITHOUT COMPLICATIONS: Chronic | Status: ACTIVE | Noted: 2023-08-11

## 2023-08-14 PROBLEM — E78.5 HYPERLIPIDEMIA, UNSPECIFIED: Chronic | Status: ACTIVE | Noted: 2023-08-11

## 2023-08-14 PROBLEM — I10 ESSENTIAL (PRIMARY) HYPERTENSION: Chronic | Status: ACTIVE | Noted: 2023-08-11

## 2023-08-14 PROBLEM — N40.0 BENIGN PROSTATIC HYPERPLASIA WITHOUT LOWER URINARY TRACT SYMPTOMS: Chronic | Status: ACTIVE | Noted: 2023-08-11

## 2023-08-14 PROBLEM — K51.90 ULCERATIVE COLITIS, UNSPECIFIED, WITHOUT COMPLICATIONS: Chronic | Status: ACTIVE | Noted: 2023-08-11

## 2023-08-14 PROBLEM — K21.9 GASTRO-ESOPHAGEAL REFLUX DISEASE WITHOUT ESOPHAGITIS: Chronic | Status: ACTIVE | Noted: 2023-08-11

## 2023-08-14 PROBLEM — I25.10 ATHEROSCLEROTIC HEART DISEASE OF NATIVE CORONARY ARTERY WITHOUT ANGINA PECTORIS: Chronic | Status: ACTIVE | Noted: 2023-08-11

## 2023-08-14 LAB — MANUAL DIF COMMENT BLD-IMP: SIGNIFICANT CHANGE UP

## 2023-08-15 LAB
A PHAGOCYTOPH DNA BLD QL NAA+PROBE: NEGATIVE — SIGNIFICANT CHANGE UP
E CHAFFEENSIS DNA BLD QL NAA+PROBE: NEGATIVE — SIGNIFICANT CHANGE UP
E EWINGII DNA SPEC QL NAA+PROBE: NEGATIVE — SIGNIFICANT CHANGE UP
EHRLICHIA DNA SPEC QL NAA+PROBE: NEGATIVE — SIGNIFICANT CHANGE UP

## 2023-08-16 LAB
CULTURE RESULTS: SIGNIFICANT CHANGE UP
CULTURE RESULTS: SIGNIFICANT CHANGE UP
SPECIMEN SOURCE: SIGNIFICANT CHANGE UP
SPECIMEN SOURCE: SIGNIFICANT CHANGE UP

## 2023-08-19 DIAGNOSIS — H91.90 UNSPECIFIED HEARING LOSS, UNSPECIFIED EAR: ICD-10-CM

## 2023-08-19 DIAGNOSIS — E43 UNSPECIFIED SEVERE PROTEIN-CALORIE MALNUTRITION: ICD-10-CM

## 2023-08-19 DIAGNOSIS — Z95.5 PRESENCE OF CORONARY ANGIOPLASTY IMPLANT AND GRAFT: ICD-10-CM

## 2023-08-19 DIAGNOSIS — N40.0 BENIGN PROSTATIC HYPERPLASIA WITHOUT LOWER URINARY TRACT SYMPTOMS: ICD-10-CM

## 2023-08-19 DIAGNOSIS — Z91.81 HISTORY OF FALLING: ICD-10-CM

## 2023-08-19 DIAGNOSIS — Z79.84 LONG TERM (CURRENT) USE OF ORAL HYPOGLYCEMIC DRUGS: ICD-10-CM

## 2023-08-19 DIAGNOSIS — Z95.1 PRESENCE OF AORTOCORONARY BYPASS GRAFT: ICD-10-CM

## 2023-08-19 DIAGNOSIS — E11.9 TYPE 2 DIABETES MELLITUS WITHOUT COMPLICATIONS: ICD-10-CM

## 2023-08-19 DIAGNOSIS — K21.9 GASTRO-ESOPHAGEAL REFLUX DISEASE WITHOUT ESOPHAGITIS: ICD-10-CM

## 2023-08-19 DIAGNOSIS — E78.5 HYPERLIPIDEMIA, UNSPECIFIED: ICD-10-CM

## 2023-08-19 DIAGNOSIS — Z79.01 LONG TERM (CURRENT) USE OF ANTICOAGULANTS: ICD-10-CM

## 2023-08-19 DIAGNOSIS — B60.00 BABESIOSIS, UNSPECIFIED: ICD-10-CM

## 2023-08-19 DIAGNOSIS — I25.10 ATHEROSCLEROTIC HEART DISEASE OF NATIVE CORONARY ARTERY WITHOUT ANGINA PECTORIS: ICD-10-CM

## 2023-08-19 DIAGNOSIS — E87.1 HYPO-OSMOLALITY AND HYPONATREMIA: ICD-10-CM

## 2023-08-19 DIAGNOSIS — I10 ESSENTIAL (PRIMARY) HYPERTENSION: ICD-10-CM

## 2023-08-21 ENCOUNTER — APPOINTMENT (OUTPATIENT)
Dept: INTERNAL MEDICINE | Facility: CLINIC | Age: 83
End: 2023-08-21
Payer: MEDICARE

## 2023-08-21 VITALS
HEART RATE: 88 BPM | SYSTOLIC BLOOD PRESSURE: 140 MMHG | OXYGEN SATURATION: 98 % | DIASTOLIC BLOOD PRESSURE: 70 MMHG | WEIGHT: 145 LBS | TEMPERATURE: 98.1 F | RESPIRATION RATE: 16 BRPM | HEIGHT: 68 IN | BODY MASS INDEX: 21.98 KG/M2

## 2023-08-21 DIAGNOSIS — Z86.19 PERSONAL HISTORY OF OTHER INFECTIOUS AND PARASITIC DISEASES: ICD-10-CM

## 2023-08-21 DIAGNOSIS — N40.0 BENIGN PROSTATIC HYPERPLASIA WITHOUT LOWER URINARY TRACT SYMPMS: ICD-10-CM

## 2023-08-21 PROCEDURE — 99214 OFFICE O/P EST MOD 30 MIN: CPT

## 2023-08-21 RX ORDER — BLOOD-GLUCOSE METER
W/DEVICE EACH MISCELLANEOUS
Qty: 1 | Refills: 0 | Status: ACTIVE | COMMUNITY
Start: 2023-08-21 | End: 1900-01-01

## 2023-08-21 NOTE — PLAN
[FreeTextEntry1] : Patient improving, almost back to baseline per patient and wife.  S/p atovaquone and azithromycin for babesiosis- completed course.  Given hyponatremia and anemia while hospitalized, will check CMP, CBC, iron studies, reticulocyte count  Diabetes: check A1c, lipids, albumin/creatinine ration. Advised pt to see ophthalmology for retinal exam. Continue metformin 1000 mg BID and glipizide 5 mg BID  HTN: BP borderline however reports controlled at home. Continue losartan 100 mg daily, metoprolol 100 mg daily HLD: continue crestor 5 mg daily. Check lipid profile.

## 2023-08-21 NOTE — HISTORY OF PRESENT ILLNESS
[Post-hospitalization from ___ Hospital] : Post-hospitalization from [unfilled] Hospital [Admitted on: ___] : The patient was admitted on [unfilled] [Discharged on ___] : discharged on [unfilled] [FreeTextEntry2] : 83 year old male with PMH CAD s/p CABGx3, type 2 DM, HTN, UC, GERD, BPH, anemia who presents today for hospital follow-up. He was admitted for generalized weakness, malaise, and fevers, and found to be positive for babesiosis via PCR. He was evaluated by ID and started on atovaquone and azithromycin. He was also noted to be anemic and hyponatremic. He did not require a blood transfusion. He did receive fluids and sodium improved from 127---> 130.  Since discharge, he has completed course of medication. He has been afebfrile and has gained much of his strength back. He is back to baseline per patient.

## 2023-08-22 LAB
ALBUMIN SERPL ELPH-MCNC: 3.9 G/DL
ALP BLD-CCNC: 93 U/L
ALT SERPL-CCNC: 27 U/L
ANION GAP SERPL CALC-SCNC: 13 MMOL/L
AST SERPL-CCNC: 22 U/L
BILIRUB SERPL-MCNC: 0.6 MG/DL
BUN SERPL-MCNC: 35 MG/DL
CALCIUM SERPL-MCNC: 9.4 MG/DL
CHLORIDE SERPL-SCNC: 100 MMOL/L
CHOLEST SERPL-MCNC: 147 MG/DL
CO2 SERPL-SCNC: 21 MMOL/L
CREAT SERPL-MCNC: 1.29 MG/DL
CREAT SPEC-SCNC: 70 MG/DL
EGFR: 56 ML/MIN/1.73M2
ESTIMATED AVERAGE GLUCOSE: 148 MG/DL
FERRITIN SERPL-MCNC: 700 NG/ML
GLUCOSE SERPL-MCNC: 226 MG/DL
HBA1C MFR BLD HPLC: 6.8 %
HDLC SERPL-MCNC: 42 MG/DL
IRON SATN MFR SERPL: 19 %
IRON SERPL-MCNC: 63 UG/DL
LDLC SERPL CALC-MCNC: 78 MG/DL
MICROALBUMIN 24H UR DL<=1MG/L-MCNC: <1.2 MG/DL
MICROALBUMIN/CREAT 24H UR-RTO: NORMAL MG/G
NONHDLC SERPL-MCNC: 104 MG/DL
POTASSIUM SERPL-SCNC: 4.8 MMOL/L
PROT SERPL-MCNC: 6.7 G/DL
RBC # BLD: 4.07 M/UL
RETICS # AUTO: 5.4 %
RETICS AGGREG/RBC NFR: 221.4 K/UL
SODIUM SERPL-SCNC: 135 MMOL/L
TIBC SERPL-MCNC: 327 UG/DL
TRIGL SERPL-MCNC: 152 MG/DL
UIBC SERPL-MCNC: 264 UG/DL
VIT B12 SERPL-MCNC: >2000 PG/ML

## 2023-09-21 ENCOUNTER — APPOINTMENT (OUTPATIENT)
Dept: GASTROENTEROLOGY | Facility: CLINIC | Age: 83
End: 2023-09-21
Payer: MEDICARE

## 2023-09-21 VITALS
DIASTOLIC BLOOD PRESSURE: 78 MMHG | OXYGEN SATURATION: 98 % | HEART RATE: 75 BPM | WEIGHT: 148 LBS | HEIGHT: 68 IN | BODY MASS INDEX: 22.43 KG/M2 | SYSTOLIC BLOOD PRESSURE: 118 MMHG

## 2023-09-21 DIAGNOSIS — D50.9 IRON DEFICIENCY ANEMIA, UNSPECIFIED: ICD-10-CM

## 2023-09-21 DIAGNOSIS — R63.4 ABNORMAL WEIGHT LOSS: ICD-10-CM

## 2023-09-21 DIAGNOSIS — D12.6 BENIGN NEOPLASM OF COLON, UNSPECIFIED: ICD-10-CM

## 2023-09-21 PROCEDURE — 99214 OFFICE O/P EST MOD 30 MIN: CPT

## 2023-09-23 PROBLEM — D50.9 IRON DEFICIENCY ANEMIA: Status: ACTIVE | Noted: 2023-02-28

## 2023-09-23 PROBLEM — R63.4 UNINTENTIONAL WEIGHT LOSS: Status: ACTIVE | Noted: 2023-06-08

## 2023-09-23 PROBLEM — D12.6 COLON ADENOMAS: Status: ACTIVE | Noted: 2023-04-11

## 2023-10-05 ENCOUNTER — APPOINTMENT (OUTPATIENT)
Dept: CARDIOLOGY | Facility: CLINIC | Age: 83
End: 2023-10-05

## 2023-10-17 ENCOUNTER — APPOINTMENT (OUTPATIENT)
Dept: GASTROENTEROLOGY | Facility: CLINIC | Age: 83
End: 2023-10-17

## 2023-12-06 ENCOUNTER — RX RENEWAL (OUTPATIENT)
Age: 83
End: 2023-12-06

## 2023-12-08 ENCOUNTER — RX RENEWAL (OUTPATIENT)
Age: 83
End: 2023-12-08

## 2023-12-13 ENCOUNTER — OUTPATIENT (OUTPATIENT)
Dept: OUTPATIENT SERVICES | Facility: HOSPITAL | Age: 83
LOS: 1 days | End: 2023-12-13
Payer: MEDICARE

## 2023-12-13 VITALS
SYSTOLIC BLOOD PRESSURE: 98 MMHG | HEART RATE: 78 BPM | HEIGHT: 68 IN | OXYGEN SATURATION: 100 % | WEIGHT: 149.91 LBS | DIASTOLIC BLOOD PRESSURE: 58 MMHG | TEMPERATURE: 97 F | RESPIRATION RATE: 16 BRPM

## 2023-12-13 DIAGNOSIS — Z95.5 PRESENCE OF CORONARY ANGIOPLASTY IMPLANT AND GRAFT: Chronic | ICD-10-CM

## 2023-12-13 DIAGNOSIS — D12.6 BENIGN NEOPLASM OF COLON, UNSPECIFIED: ICD-10-CM

## 2023-12-13 DIAGNOSIS — Z01.818 ENCOUNTER FOR OTHER PREPROCEDURAL EXAMINATION: ICD-10-CM

## 2023-12-13 DIAGNOSIS — E11.9 TYPE 2 DIABETES MELLITUS WITHOUT COMPLICATIONS: ICD-10-CM

## 2023-12-13 DIAGNOSIS — Z95.5 PRESENCE OF CORONARY ANGIOPLASTY IMPLANT AND GRAFT: ICD-10-CM

## 2023-12-13 DIAGNOSIS — Z98.890 OTHER SPECIFIED POSTPROCEDURAL STATES: Chronic | ICD-10-CM

## 2023-12-13 DIAGNOSIS — Z95.1 PRESENCE OF AORTOCORONARY BYPASS GRAFT: Chronic | ICD-10-CM

## 2023-12-13 LAB
A1C WITH ESTIMATED AVERAGE GLUCOSE RESULT: 7.3 % — HIGH (ref 4–5.6)
A1C WITH ESTIMATED AVERAGE GLUCOSE RESULT: 7.3 % — HIGH (ref 4–5.6)
ANION GAP SERPL CALC-SCNC: 12 MMOL/L — SIGNIFICANT CHANGE UP (ref 5–17)
BUN SERPL-MCNC: 31 MG/DL — HIGH (ref 7–23)
CALCIUM SERPL-MCNC: 9.2 MG/DL — SIGNIFICANT CHANGE UP (ref 8.4–10.5)
CHLORIDE SERPL-SCNC: 101 MMOL/L — SIGNIFICANT CHANGE UP (ref 96–108)
CO2 SERPL-SCNC: 23 MMOL/L — SIGNIFICANT CHANGE UP (ref 22–31)
CREAT SERPL-MCNC: 1.14 MG/DL — SIGNIFICANT CHANGE UP (ref 0.5–1.3)
EGFR: 65 ML/MIN/1.73M2 — SIGNIFICANT CHANGE UP
ESTIMATED AVERAGE GLUCOSE: 163 MG/DL — HIGH (ref 68–114)
ESTIMATED AVERAGE GLUCOSE: 163 MG/DL — HIGH (ref 68–114)
GLUCOSE SERPL-MCNC: 240 MG/DL — HIGH (ref 70–99)
HCT VFR BLD CALC: 34.4 % — LOW (ref 39–50)
HGB BLD-MCNC: 10.6 G/DL — LOW (ref 13–17)
MCHC RBC-ENTMCNC: 24.4 PG — LOW (ref 27–34)
MCHC RBC-ENTMCNC: 30.8 GM/DL — LOW (ref 32–36)
MCV RBC AUTO: 79.3 FL — LOW (ref 80–100)
NRBC # BLD: 0 /100 WBCS — SIGNIFICANT CHANGE UP (ref 0–0)
PLATELET # BLD AUTO: 208 K/UL — SIGNIFICANT CHANGE UP (ref 150–400)
POTASSIUM SERPL-MCNC: 4.5 MMOL/L — SIGNIFICANT CHANGE UP (ref 3.5–5.3)
POTASSIUM SERPL-SCNC: 4.5 MMOL/L — SIGNIFICANT CHANGE UP (ref 3.5–5.3)
RBC # BLD: 4.34 M/UL — SIGNIFICANT CHANGE UP (ref 4.2–5.8)
RBC # FLD: 13.6 % — SIGNIFICANT CHANGE UP (ref 10.3–14.5)
SODIUM SERPL-SCNC: 136 MMOL/L — SIGNIFICANT CHANGE UP (ref 135–145)
WBC # BLD: 5.15 K/UL — SIGNIFICANT CHANGE UP (ref 3.8–10.5)
WBC # FLD AUTO: 5.15 K/UL — SIGNIFICANT CHANGE UP (ref 3.8–10.5)

## 2023-12-13 PROCEDURE — 80048 BASIC METABOLIC PNL TOTAL CA: CPT

## 2023-12-13 PROCEDURE — 85027 COMPLETE CBC AUTOMATED: CPT

## 2023-12-13 PROCEDURE — G0463: CPT

## 2023-12-13 PROCEDURE — 83036 HEMOGLOBIN GLYCOSYLATED A1C: CPT

## 2023-12-13 RX ORDER — METOPROLOL TARTRATE 50 MG
1 TABLET ORAL
Refills: 0 | DISCHARGE

## 2023-12-13 NOTE — H&P PST ADULT - PROBLEM SELECTOR PLAN 2
HGA1C ordered and obtained at [facility]  FS on admit  Pt advised to hold Glipizide DOS with last dose on 12/19/23 AM  Pt advised to hold Metformin DOS with last dose on 12/19/23 PM HGA1C ordered and obtained at [facility]  FS on admit  Pt advised to hold Glipizide DOS with last dose on 12/19/23 AM  Pt advised to hold Metformin DOS with last dose on 12/19/23 PM

## 2023-12-13 NOTE — H&P PST ADULT - HISTORY OF PRESENT ILLNESS
83 year old male with PMH HTN, HLD, GERD, BPH, CAD s/p PCI intervention x 1 (25 years ago) and CABG x 3 (20 years ago - on plavix) reports that he recently had an EGD colonoscopy due to anemia in 7/2023. As per Dr. Rose's note, a large adenomatous polyp was partially removed and biopsied during colonoscopy. As per patient, biopsy results were negative. Of note, he was hospitalized on 8/10/23 for recurrent mechanical falls and malaise for about 3 days. Pt was treated for a positive babesiosis infection and states that he feels better now. Family reports that the anemia was attributed to acute infection and has since resolved. Pt now presents to [facility] for scheduled colonoscopy with Dr. Rose on 12/20/23 at the endoscopy suite. 83 year old male with PMH HTN, HLD, GERD, BPH, CAD s/p PCI intervention x 1 (25 years ago) and CABG x 3 (20 years ago - on plavix) reports that he recently had an EGD colonoscopy due to anemia in 7/2023. As per Dr. Rose's note, a large adenomatous polyp was partially removed and biopsied during colonoscopy. As per patient, biopsy results were negative. Of note, he was hospitalized on 8/10/23 for recurrent mechanical falls and malaise for about 3 days. Pt was treated for a positive babesiosis infection and states that he feels better now. Family reports that the anemia was attributed to acute infection and has since resolved. Pt now presents to [facility] for scheduled colonoscopy with Dr. Rose on 12/20/23 at the endoscopy suite. 83 year old male with PMH HTN, HLD, GERD, BPH, CAD s/p PCI intervention x 1 (25 years ago) and CABG x 3 (20 years ago - on plavix) reports that he recently had an EGD colonoscopy due to anemia in 7/2023. A large adenomatous polyp was partially removed and biopsied during colonoscopy. As per patient, biopsy results were negative. He was hospitalized on 8/10/23 for recurrent mechanical falls and malaise for about 3 days. Pt was treated for a positive babesiosis infection and states that he feels better now. Family reports that the anemia was attributed to acute babesiosis infection and has since resolved. Pt now presents to [facility] for scheduled colonoscopy with Dr. Rose on 12/20/23 at the endoscopy suite. 83 year old male with PMH HTN, HLD, GERD, BPH, CAD s/p PCI intervention x 1 (25 years ago) and CABG x 3 (20 years ago - on plavix) reports that he recently had an EGD colonoscopy due to anemia in 7/2023. A large adenomatous polyp was partially removed and biopsied during colonoscopy. As per patient, biopsy results were negative. He was hospitalized on 8/10/23 for recurrent mechanical falls and malaise for about 3 days. Pt was treated for a positive babesiosis infection and states that he feels better now. Family reports that the anemia was attributed to acute babesiosis infection and has since resolved. Pt now presents to [facility] for scheduled colonoscopy with Dr. Rose on 12/20/23 at the endoscopy suite.

## 2023-12-13 NOTE — H&P PST ADULT - NSICDXPASTMEDICALHX_GEN_ALL_CORE_FT
PAST MEDICAL HISTORY:  3-vessel CAD     Benign neoplasm of colon, unspecified     BPH (benign prostatic hyperplasia)     Diabetes mellitus, type 2     GERD (gastroesophageal reflux disease)     History of babesiosis     HLD (hyperlipidemia)     HTN (hypertension)     Stented coronary artery     Ulcerative colitis

## 2023-12-13 NOTE — H&P PST ADULT - COMMENTS
Pt states his BP is normally 110/70 mmhg when he takes his antihypertensives - pt denies any dizziness, syncope or near-syncope episodes

## 2023-12-13 NOTE — H&P PST ADULT - PROBLEM SELECTOR PLAN 3
Pt advised to hold Plavix 7 days prior to surgery with last dose on 12/12/23 - plan to be discussed with cardiologist with teach back understanding

## 2023-12-13 NOTE — H&P PST ADULT - ASSESSMENT
IV discontinued, cath removed intact DASI score: 6.70  DASI activity: Able to walk 2-3 blocks, ADLs, Grocery Shopping, 1 Flight of Stairs  Loose teeth or denture: partial upper and lower denture, denies loose teeth

## 2023-12-13 NOTE — H&P PST ADULT - NSICDXPASTSURGICALHX_GEN_ALL_CORE_FT
PAST SURGICAL HISTORY:  History of colonoscopy     S/P CABG x 3     S/P coronary artery stent placement

## 2023-12-13 NOTE — H&P PST ADULT - NSANTHOSAYNRD_GEN_A_CORE
No. DI screening performed.  STOP BANG Legend: 0-2 = LOW Risk; 3-4 = INTERMEDIATE Risk; 5-8 = HIGH Risk

## 2023-12-13 NOTE — H&P PST ADULT - OTHER CARE PROVIDERS
Dr. V. Palla (Cardiologist) [phone] - last visit 6/2023 for F/U Dr. V. Palla (Cardiologist) [phone] - last visit 6/2023 for F/U

## 2023-12-13 NOTE — H&P PST ADULT - PRIMARY CARE PROVIDER
Dr. Rosita Maher [phone] - last visit 8/2023 for F/U Dr. Rosita Maher [phone] - last visit 8/2023 for F/U

## 2023-12-19 ENCOUNTER — RX RENEWAL (OUTPATIENT)
Age: 83
End: 2023-12-19

## 2023-12-20 ENCOUNTER — OUTPATIENT (OUTPATIENT)
Dept: OUTPATIENT SERVICES | Facility: HOSPITAL | Age: 83
LOS: 1 days | End: 2023-12-20
Payer: MEDICARE

## 2023-12-20 ENCOUNTER — APPOINTMENT (OUTPATIENT)
Dept: GASTROENTEROLOGY | Facility: HOSPITAL | Age: 83
End: 2023-12-20

## 2023-12-20 ENCOUNTER — TRANSCRIPTION ENCOUNTER (OUTPATIENT)
Age: 83
End: 2023-12-20

## 2023-12-20 VITALS
DIASTOLIC BLOOD PRESSURE: 66 MMHG | OXYGEN SATURATION: 99 % | HEART RATE: 64 BPM | SYSTOLIC BLOOD PRESSURE: 146 MMHG | RESPIRATION RATE: 17 BRPM

## 2023-12-20 VITALS
WEIGHT: 149.91 LBS | DIASTOLIC BLOOD PRESSURE: 58 MMHG | RESPIRATION RATE: 22 BRPM | SYSTOLIC BLOOD PRESSURE: 103 MMHG | TEMPERATURE: 98 F | HEIGHT: 68 IN | HEART RATE: 67 BPM | OXYGEN SATURATION: 100 %

## 2023-12-20 DIAGNOSIS — Z98.890 OTHER SPECIFIED POSTPROCEDURAL STATES: Chronic | ICD-10-CM

## 2023-12-20 DIAGNOSIS — D12.6 BENIGN NEOPLASM OF COLON, UNSPECIFIED: ICD-10-CM

## 2023-12-20 DIAGNOSIS — Z95.5 PRESENCE OF CORONARY ANGIOPLASTY IMPLANT AND GRAFT: Chronic | ICD-10-CM

## 2023-12-20 DIAGNOSIS — Z95.1 PRESENCE OF AORTOCORONARY BYPASS GRAFT: Chronic | ICD-10-CM

## 2023-12-20 LAB
GLUCOSE BLDC GLUCOMTR-MCNC: 164 MG/DL — HIGH (ref 70–99)
GLUCOSE BLDC GLUCOMTR-MCNC: 164 MG/DL — HIGH (ref 70–99)

## 2023-12-20 PROCEDURE — 45381 COLONOSCOPY SUBMUCOUS NJX: CPT

## 2023-12-20 PROCEDURE — 45378 DIAGNOSTIC COLONOSCOPY: CPT

## 2023-12-20 PROCEDURE — 82962 GLUCOSE BLOOD TEST: CPT

## 2023-12-20 DEVICE — NET RETRV ROT ROTH 2.5MMX230CM: Type: IMPLANTABLE DEVICE | Status: FUNCTIONAL

## 2023-12-20 RX ORDER — SODIUM CHLORIDE 9 MG/ML
500 INJECTION INTRAMUSCULAR; INTRAVENOUS; SUBCUTANEOUS
Refills: 0 | Status: COMPLETED | OUTPATIENT
Start: 2023-12-20 | End: 2023-12-20

## 2023-12-20 RX ADMIN — SODIUM CHLORIDE 30 MILLILITER(S): 9 INJECTION INTRAMUSCULAR; INTRAVENOUS; SUBCUTANEOUS at 10:01

## 2023-12-20 NOTE — ASU DISCHARGE PLAN (ADULT/PEDIATRIC) - NS MD DC FALL RISK RISK
For information on Fall & Injury Prevention, visit: [email or web address] OR  [email or web address] OR  https://www.cdc.gov/steadi/patient.html For information on Fall & Injury Prevention, visit: [email or web address] OR  [email or web address]-injury OR  https://www.cdc.gov/steadi/patient.html

## 2023-12-20 NOTE — ASU PATIENT PROFILE, ADULT - FALL HARM RISK - HARM RISK INTERVENTIONS
Communicate Risk of Fall with Harm to all staff/Reinforce activity limits and safety measures with patient and family/Tailored Fall Risk Interventions/Visual Cue: Yellow wristband and red socks/Bed in lowest position, wheels locked, appropriate side rails in place/Call bell, personal items and telephone in reach/Instruct patient to call for assistance before getting out of bed or chair/Non-slip footwear when patient is out of bed/[place] to call system/Physically safe environment - no spills, clutter or unnecessary equipment/Purposeful Proactive Rounding/Room/bathroom lighting operational, light cord in reach Communicate Risk of Fall with Harm to all staff/Reinforce activity limits and safety measures with patient and family/Tailored Fall Risk Interventions/Visual Cue: Yellow wristband and red socks/Bed in lowest position, wheels locked, appropriate side rails in place/Call bell, personal items and telephone in reach/Instruct patient to call for assistance before getting out of bed or chair/Non-slip footwear when patient is out of bed/[place] to call system/Physically safe environment - no spills, clutter or unnecessary equipment/Purposeful Proactive Rounding/Room/bathroom lighting operational, light cord in reach

## 2023-12-20 NOTE — PRE PROCEDURE NOTE - PRE PROCEDURE EVALUATION
Attending Physician:   Milton                         Procedure:  colonoscopy / removal of polyp    Indication for Procedure:  large colon polyp  ________________________________________________________  PAST MEDICAL & SURGICAL HISTORY:  3-vessel CAD      HTN (hypertension)      HLD (hyperlipidemia)      Diabetes mellitus, type 2      BPH (benign prostatic hyperplasia)      GERD (gastroesophageal reflux disease)      Ulcerative colitis      Stented coronary artery      History of babesiosis      Benign neoplasm of colon, unspecified      S/P CABG x 3      S/P coronary artery stent placement      History of colonoscopy        ALLERGIES:  No Known Drug Allergies  CT Dye (Unknown)    HOME MEDICATIONS:  clopidogrel 75 mg oral tablet: 1 tab(s) orally once a day  finasteride 5 mg oral tablet: 1 tab(s) orally once a day  glipiZIDE 5 mg oral tablet: 1 tab(s) orally 2 times a day  losartan 100 mg oral tablet: 1 tab(s) orally once a day  metFORMIN 1000 mg oral tablet: 1 tab(s) orally 2 times a day  metoprolol succinate 25 mg oral tablet, extended release: 1 tab(s) orally once a day  omeprazole 40 mg oral delayed release capsule: 1 cap(s) orally once a day  rosuvastatin 5 mg oral tablet: 1 tab(s) orally once a day (at bedtime)  tamsulosin 0.4 mg oral capsule: 1 cap(s) orally once a day (at bedtime)    AICD/PPM: [ ] yes   [ x] no    PERTINENT LAB DATA:                      PHYSICAL EXAMINATION:    Height (cm): 172.7  Weight (kg): 68  BMI (kg/m2): 22.8  BSA (m2): 1.81T(C): 36.5  HR: 67  BP: 103/58  RR: 22  SpO2: 100%    Constitutional: NAD  HEENT: anicteric  Neck:  No JVD  Respiratory: CTAB/L  Cardiovascular: S1 and S2  Gastrointestinal: BS+, soft, NT/ND  Extremities: No peripheral edema  Neurological: No confusion  Psychiatric: Normal mood, normal affect  Skin: No rashes    ASA Class: I [ ]  II [ ]  III [ x]  IV [ ]    COMMENTS:    The patient is a suitable candidate for the planned procedure unless box checked [ ]  No, explain:

## 2024-01-05 ENCOUNTER — RX RENEWAL (OUTPATIENT)
Age: 84
End: 2024-01-05

## 2024-01-05 RX ORDER — BLOOD SUGAR DIAGNOSTIC
STRIP MISCELLANEOUS 3 TIMES DAILY
Qty: 100 | Refills: 2 | Status: ACTIVE | COMMUNITY
Start: 2023-08-21 | End: 1900-01-01

## 2024-02-15 PROBLEM — Z86.19 PERSONAL HISTORY OF OTHER INFECTIOUS AND PARASITIC DISEASES: Chronic | Status: ACTIVE | Noted: 2023-12-13

## 2024-02-15 PROBLEM — Z95.5 PRESENCE OF CORONARY ANGIOPLASTY IMPLANT AND GRAFT: Chronic | Status: ACTIVE | Noted: 2023-12-13

## 2024-02-15 PROBLEM — D12.6 BENIGN NEOPLASM OF COLON, UNSPECIFIED: Chronic | Status: ACTIVE | Noted: 2023-12-13

## 2024-02-28 ENCOUNTER — RX RENEWAL (OUTPATIENT)
Age: 84
End: 2024-02-28

## 2024-02-28 RX ORDER — FINASTERIDE 5 MG/1
5 TABLET, FILM COATED ORAL
Qty: 90 | Refills: 3 | Status: ACTIVE | COMMUNITY
Start: 2018-05-01 | End: 1900-01-01

## 2024-02-28 RX ORDER — METFORMIN HYDROCHLORIDE 1000 MG/1
1000 TABLET, COATED ORAL TWICE DAILY
Qty: 180 | Refills: 2 | Status: ACTIVE | COMMUNITY
Start: 2018-01-02 | End: 1900-01-01

## 2024-02-28 RX ORDER — METOPROLOL SUCCINATE 25 MG/1
25 TABLET, EXTENDED RELEASE ORAL
Qty: 90 | Refills: 2 | Status: ACTIVE | COMMUNITY
Start: 2017-10-23 | End: 1900-01-01

## 2024-02-29 ENCOUNTER — APPOINTMENT (OUTPATIENT)
Dept: CARDIOLOGY | Facility: CLINIC | Age: 84
End: 2024-02-29
Payer: MEDICARE

## 2024-02-29 VITALS
HEART RATE: 70 BPM | BODY MASS INDEX: 23.57 KG/M2 | DIASTOLIC BLOOD PRESSURE: 60 MMHG | SYSTOLIC BLOOD PRESSURE: 108 MMHG | WEIGHT: 155 LBS | OXYGEN SATURATION: 98 %

## 2024-02-29 DIAGNOSIS — I25.10 ATHEROSCLEROTIC HEART DISEASE OF NATIVE CORONARY ARTERY W/OUT ANGINA PECTORIS: ICD-10-CM

## 2024-02-29 DIAGNOSIS — K21.9 GASTRO-ESOPHAGEAL REFLUX DISEASE W/OUT ESOPHAGITIS: ICD-10-CM

## 2024-02-29 DIAGNOSIS — E11.9 TYPE 2 DIABETES MELLITUS W/OUT COMPLICATIONS: ICD-10-CM

## 2024-02-29 PROCEDURE — 93000 ELECTROCARDIOGRAM COMPLETE: CPT

## 2024-02-29 PROCEDURE — G2211 COMPLEX E/M VISIT ADD ON: CPT

## 2024-02-29 PROCEDURE — 99214 OFFICE O/P EST MOD 30 MIN: CPT

## 2024-02-29 NOTE — CARDIOLOGY SUMMARY
[de-identified] : 3/16/23   sinus rhythm non specific T changes  [de-identified] : 1/20/22  medium size mild to moderate basal ,mid anterior wall ischemia  EF 52% on chemical stress test  [de-identified] : 11/651077  16-49% mild plaque Right  ,1-15% minimal plaque left CCA [de-identified] : 6/24/21  Normal EF 55-60%  paradoxical septal motion   Minimal trace

## 2024-02-29 NOTE — ASSESSMENT
[FreeTextEntry1] : Patient with above hx   CAD  CABG Prior PCI  :  abnormal nuclear stress test   showing mild basal  mid anterior wall ischemia , patient says he does not have any exertional chest pain , he does not want to have cardiac cath ,  continue his present medication .  advised the patient to seek medical attention if he develops  chest pain or shortness of breath   DM:, improved from last visit  Hb a1c 6.8  ,advised the patient to change metformin to 1000 mg po bid  , glipizide to 5 mg twice , home monitoring ,recommended opthalmology , podiatric evaluation .   HLD : continue low cholesterol diet ,change  medication due to side effect , crestor 5 mg po daily ,   HTN:  improved  , advised to follow low salt diet , home BP monitor ,continue metoprolol succinate , losartan 100 mg po daily ,  Anemia due to iron deficiency: gastroduodenitis  colon polyps ,  residual polyp , to have polypectomy , dr Rose    advised to continue Iron , continue omeprazole 40 mg po daily   BPH , advised the patient to be evaluated by urologist ,   patient was advised to see urologist ,

## 2024-02-29 NOTE — REASON FOR VISIT
[Symptom and Test Evaluation] : symptom and test evaluation [Hyperlipidemia] : hyperlipidemia [Coronary Artery Disease] : coronary artery disease [Hypertension] : hypertension [Other: ____] : [unfilled]

## 2024-02-29 NOTE — HISTORY OF PRESENT ILLNESS
[FreeTextEntry1] : Patient  with hx of DM,HTN,HLD CABG 3 vessel CABG  anemia ,gastroduodenitis , colon polyps , treated babesiosis , was hospitalized in august in 2023 , patient was treated , he is feeling better, still hard of  hearing ,  Patient denies any chest pain or shortness of breath or dizziness . denies any chills or fever   Patient does exercise regularly without any difficulty, patient does not sleep well due to urination problem ,   Patient had blood work showed elevated fasting sugar 145 , hemoglobin A1c 6.8     normal lipid profile    hemoglobin   11.2  decreased from 14.2  ferritin 10 on Iron tablet , which he is not taking well   patient is taking low dose crestor 5 mg po daily now , tolerating well  LDL 78  , creatinine1.29  blood pressure  is controlled   Patient did not see urology ,

## 2024-04-16 NOTE — ASU PREOP CHECKLIST - VERIFY SURGICAL SITE/SIDE WITH PATIENT
"  Assessment & Plan   Ms. Heather Guillory is a 43 year old woman with PMHx of undifferentiated connective tissue disease (NINOSKA positive), anxiety, history of uterine polyp s/p removal who presents to the clinic for evaluation of persistent L-sided abdominal pain.    # LUQ abdominal pain  Patient has been having left-sided abdominal pain for 2 years. Biopsies negative for celiac, H. Pylori. Lipase/amylase negative, HIDA scan and recent CTAP unremarkable. EGD in 12/2022 showed erosive gastropathy, has not been taking PPI. Of note, has also been taking PO iron supplements for 2 years. Wonder if her symptoms (including nausea, abdominal pain, loss of appetite) could be related to this. Will plan to stop iron supplements and see if her pain improves. Will also recommend that she take omeprazole 20mg daily, 30 mins before meal.  - Stop iron supplements  - Start omeprazole 20mg qd (has prescription at home)  - Follow-up in 2 months for re-evaluation    BMI  Estimated body mass index is 24.59 kg/m  as calculated from the following:    Height as of this encounter: 1.702 m (5' 7\").    Weight as of 2/1/24: 71.2 kg (157 lb).     Mandi Johnson,   Internal Medicine PGY-1  [facility]  04/16/24     Tanner Romero is a 43 year old, presenting for the following health issues:  Follow Up (Pt here to follow up on abdominal pain in upper left quadrant )        4/16/2024     8:50 AM   Additional Questions   Roomed by Nida      Via the Health Maintenance questionnaire, the patient has reported the following services have been completed -Eye Exam, this information has been sent to the abstraction team.  History of Present Illness       Reason for visit:  Abdominal pain    She eats 2-3 servings of fruits and vegetables daily.She consumes 0 sweetened beverage(s) daily.She exercises with enough effort to increase her heart rate 30 to 60 minutes per day.  She exercises with enough effort to increase her heart rate 4 " "days per week.   She is taking medications regularly.     Ms. Heather Guillory is a 43 year old woman with PMHx of undifferentiated connective tissue disease (NINOSKA positive), anxiety, history of uterine polyp s/p removal who presents to the clinic for evaluation of persistent L-sided abdominal pain.    States that she will have a \"pain attack\" for ~ 1-2 weeks, then pain will improve with cycle restarting. Describes it as a dull, colicky pain. Does not think it is related to eating or bowel habits. Also endorses nausea, loss of appetite, constipation, belching, fatigue. Denies fevers, chills, night sweats, vomiting. Saw GI in 12/2022 for EGD and colonoscopy - showed erosive gastropathy. Repeat EGD in 09/2023 showed some improvement in this. Negative for celiac, H. Pylori.     Of note, has been taking iron supplement for ~ 2 years as well. Has tried taking omeprazole but does not take it regularly. 10-point ROS is otherwise negative aside from what is mentioned in the HPI above.       Objective    /74 (BP Location: Right arm, Patient Position: Sitting, Cuff Size: Adult Regular)   Pulse 69   Temp 97.7  F (36.5  C) (Oral)   Ht 1.702 m (5' 7\")   SpO2 100%   BMI 24.59 kg/m    Body mass index is 24.59 kg/m .  Physical Exam   General: Alert and oriented without distress  HEENT: NCAT, anicteric sclera  Respiratory: CTAB, no wheezes or crackles appreciated. No use of accessory muscles.  Cardiovascular: RRR without murmurs, rubs or gallop.   Abdomen: Bowel sounds present. Soft, non-distended. Mild diffuse tenderness to palpation of LUQ/LLQ without rebound or guarding.  Skin: No edema to lower extremities. 2+ pulses palpable. No overt lesions, bruises, rashes or bleeding on exposed skin surfaces.  Neuro: Alert & oriented x3. No focal neurologic deficits appreciated.          Signed Electronically by: Mandi Johnson DO      While the patient was in clinic, I reviewed the pertinent medical history and results.  I " discussed the current findings on physical examination, as well as the patient s diagnosis and treatment plan with the resident and agree with the information as documented with the following exceptions: none.  Omkar Siu MD     done

## 2024-04-30 ENCOUNTER — OUTPATIENT (OUTPATIENT)
Dept: OUTPATIENT SERVICES | Facility: HOSPITAL | Age: 84
LOS: 1 days | End: 2024-04-30
Payer: MEDICARE

## 2024-04-30 VITALS
TEMPERATURE: 98 F | OXYGEN SATURATION: 99 % | RESPIRATION RATE: 12 BRPM | DIASTOLIC BLOOD PRESSURE: 53 MMHG | SYSTOLIC BLOOD PRESSURE: 100 MMHG | HEART RATE: 67 BPM | WEIGHT: 149.91 LBS | HEIGHT: 66 IN

## 2024-04-30 DIAGNOSIS — I25.10 ATHEROSCLEROTIC HEART DISEASE OF NATIVE CORONARY ARTERY WITHOUT ANGINA PECTORIS: ICD-10-CM

## 2024-04-30 DIAGNOSIS — D12.6 BENIGN NEOPLASM OF COLON, UNSPECIFIED: ICD-10-CM

## 2024-04-30 DIAGNOSIS — Z95.1 PRESENCE OF AORTOCORONARY BYPASS GRAFT: Chronic | ICD-10-CM

## 2024-04-30 DIAGNOSIS — Z01.818 ENCOUNTER FOR OTHER PREPROCEDURAL EXAMINATION: ICD-10-CM

## 2024-04-30 DIAGNOSIS — E11.9 TYPE 2 DIABETES MELLITUS WITHOUT COMPLICATIONS: ICD-10-CM

## 2024-04-30 DIAGNOSIS — Z98.890 OTHER SPECIFIED POSTPROCEDURAL STATES: Chronic | ICD-10-CM

## 2024-04-30 DIAGNOSIS — Z95.5 PRESENCE OF CORONARY ANGIOPLASTY IMPLANT AND GRAFT: Chronic | ICD-10-CM

## 2024-04-30 LAB
A1C WITH ESTIMATED AVERAGE GLUCOSE RESULT: 7.5 % — HIGH (ref 4–5.6)
ANION GAP SERPL CALC-SCNC: 12 MMOL/L — SIGNIFICANT CHANGE UP (ref 5–17)
BUN SERPL-MCNC: 34 MG/DL — HIGH (ref 7–23)
CALCIUM SERPL-MCNC: 9.6 MG/DL — SIGNIFICANT CHANGE UP (ref 8.4–10.5)
CHLORIDE SERPL-SCNC: 104 MMOL/L — SIGNIFICANT CHANGE UP (ref 96–108)
CO2 SERPL-SCNC: 22 MMOL/L — SIGNIFICANT CHANGE UP (ref 22–31)
CREAT SERPL-MCNC: 1.45 MG/DL — HIGH (ref 0.5–1.3)
EGFR: 48 ML/MIN/1.73M2 — LOW
ESTIMATED AVERAGE GLUCOSE: 169 MG/DL — HIGH (ref 68–114)
GLUCOSE SERPL-MCNC: 249 MG/DL — HIGH (ref 70–99)
HCT VFR BLD CALC: 29.9 % — LOW (ref 39–50)
HGB BLD-MCNC: 8.6 G/DL — LOW (ref 13–17)
MCHC RBC-ENTMCNC: 19.5 PG — LOW (ref 27–34)
MCHC RBC-ENTMCNC: 28.8 GM/DL — LOW (ref 32–36)
MCV RBC AUTO: 67.6 FL — LOW (ref 80–100)
NRBC # BLD: 0 /100 WBCS — SIGNIFICANT CHANGE UP (ref 0–0)
PLATELET # BLD AUTO: 214 K/UL — SIGNIFICANT CHANGE UP (ref 150–400)
POTASSIUM SERPL-MCNC: 5 MMOL/L — SIGNIFICANT CHANGE UP (ref 3.5–5.3)
POTASSIUM SERPL-SCNC: 5 MMOL/L — SIGNIFICANT CHANGE UP (ref 3.5–5.3)
RBC # BLD: 4.42 M/UL — SIGNIFICANT CHANGE UP (ref 4.2–5.8)
RBC # FLD: 17 % — HIGH (ref 10.3–14.5)
SODIUM SERPL-SCNC: 138 MMOL/L — SIGNIFICANT CHANGE UP (ref 135–145)
WBC # BLD: 4.43 K/UL — SIGNIFICANT CHANGE UP (ref 3.8–10.5)
WBC # FLD AUTO: 4.43 K/UL — SIGNIFICANT CHANGE UP (ref 3.8–10.5)

## 2024-04-30 PROCEDURE — G0463: CPT

## 2024-04-30 PROCEDURE — 80048 BASIC METABOLIC PNL TOTAL CA: CPT

## 2024-04-30 PROCEDURE — 83036 HEMOGLOBIN GLYCOSYLATED A1C: CPT

## 2024-04-30 PROCEDURE — 85027 COMPLETE CBC AUTOMATED: CPT

## 2024-04-30 RX ORDER — OMEPRAZOLE 10 MG/1
1 CAPSULE, DELAYED RELEASE ORAL
Refills: 0 | DISCHARGE

## 2024-04-30 NOTE — H&P PST ADULT - PROBLEM SELECTOR PLAN 2
HGA1C ordered and obtained at [facility]  FS on admit  Pt advised to hold Glipizide DOS with last dose on 12/19/23 AM  Pt advised to hold Metformin DOS with last dose on 12/19/23 PM Patient instructed to hold Glipizide and Metformin on DOS, with last dose on 5/6/24. HemA1c done today at [facility].

## 2024-04-30 NOTE — H&P PST ADULT - ASSESSMENT
Activity: Patient states that exercises every morning for 10 minutes with cardio exercises, and spends 3-4 hours gardening every day, ADL's, with no symptoms.     DASI: 7.25    Mallampati: 2    Dental: Partial upper dentures, denies loose teeth.

## 2024-04-30 NOTE — H&P PST ADULT - PROBLEM SELECTOR PLAN 1
Pt is scheduled for a colonoscopy with Dr. Rose on 12/20/23 at the endoscopy suite  CBC, BMP ordered and obtained at [facility] on 5/7/24.   CBC, BMP, and HemA1c done today at [facility].   Pre op instructions provided and all questions answered.

## 2024-04-30 NOTE — H&P PST ADULT - HISTORY OF PRESENT ILLNESS
84 year old male with PMH HTN, HLD, GERD, BPH, CAD s/p PCI intervention x 1 (25 years ago) and CABG x 3 (20 years ago - on plavix), EGD colonoscopy due to anemia in 7/2023 where large adenomatous polyp was removed and biopsied and negative per patient, hospitalized 8/2023 for positive babesiosis infection which was treated, colon polyps s/p colonoscopy 12/2023 where patient states they were unable to remove the polyp due to its position. He now presents today to PST prior to scheduled Colonoscopy Anes on 5/7/24. Patient denies recent fever, chills, chest pain, SOB, palpitations, or recent exposure to COVID-19. Patient states that he exercises every morning for 10 minutes with cardio and works in his yard gardening for 3-4 hours every day and he has no chest pain, shortness of breath, or any other symptoms.

## 2024-04-30 NOTE — H&P PST ADULT - PRIMARY CARE PROVIDER
- I recommend starting a fiber supplement with either fibercon or citrucel to bulk stool  - we will schedule you for a colonoscopy  - schedule GB ultrasound to look for gallstones  - will screen for celiac disease  - RTC 6 months   Dr. Rosita Maher- [phone]

## 2024-04-30 NOTE — H&P PST ADULT - PROBLEM SELECTOR PLAN 3
Pt advised to hold Plavix 7 days prior to surgery with last dose on 12/12/23 - plan to be discussed with cardiologist with teach back understanding Patient instructed to hold Plavix for 7 days prior to procedure, with last dose on 4/29/24. Patient and his wife are instructed to discuss this with patient's cardiologist and they state that he is aware.   Patient denies any cardiac symptoms.

## 2024-04-30 NOTE — H&P PST ADULT - NSANTHNECKRD_ENT_A_CORE
Royal MARIBETH Stone, a 78 year old male is here today for:    Chief Complaint   Patient presents with   • Abdominal Pain     x2 days   • Arm Pain     x2 days   • Headache     x2 days         SUBJECTIVE:    Abdominal pain more bloating no nausea no fever decreased stool output feels like he needs to pass gas and burp, no previous abdominal problem problems graft receive flu vaccine 1:00 at 322 since that time bright red puffy sore left arm seems to coned-down about 11:00 today but wanted evaluated    Headache increasing ×2 days,    December 2018 patient fell, cause head injury, was seen again in January, no other abnormalities, since that time he has multiple headaches and sensitivity to light noise and vibration had a recent increase in headache when he was holding saw over his head     Takes  tizanidine from his PCP,sees neurology soon ,  increased head pain took a tizanidine this morning at 1120 direct Ding to fill soup at [employer] for 2 hours is concerned that he got so sleepy from the medication    RN notes reviewed    No camping / bug bite / travel/   no ill exposure ; no fever/chills,  no joint pain, no rash  No  chest pain ; no palpitations;   no shortness of breath;    no n/v/d   no urinary urgency/ discomfort  no dizziness/ no lightheadedness; normal sensorium /not confused; no numbness/ tingling; no facial asymmetry; no weakness ; no weight loss     I have reviewed the past medical history, family history, social history, medications and allergies listed in the medical record as obtained by my nursing staff and support staff and agree with their documentation.      PHYSICAL EXAM:  Blood pressure 137/76, pulse 65, temperature 97.7 °F (36.5 °C), temperature source Oral, resp. rate 16, weight 98 kg, SpO2 96 %.     Well oritz non tox   HEENT:  conjunctiva clear, oral membranes moist clear,  posterior pharynx clear, TM cl  Neck : supple no JVD thyromegaly or bruit  Lungs: Sp02: 96 % clear anterior posterior no  wheeze or rhonchi  Heart : regular rate rhythm no murmur  Abdomen :  Bloated  Mild ^ pitch  ;no hepatosplenomegaly no guarding/ rebound/ rigidity; no CVA tenderness   Skin:  No  rash lesion change pigmentation  MSK:  no change of range of motion, no deformity , no  Lock/click/ effusion, str 5/5  Neuro:   No meningismus ;cranial nerves II through XII intact; motor /sensory within normal limits;  Left upper extremity , Mild swelling at deltoid     ROM nl ,   Coordination NL     H&H 12.7 /37.9   Creat 1.19    UA straw sg 1005     FINDINGS:    Bowel gas pattern is nonspecific. No overt distention. Nonspecific small  air-fluid levels are seen. No overt distention.  Small sclerosis is noted in right sacrum unchanged from before.. There are  no suspicious calcifications over the kidneys or ureters.        IMPRESSION:  There are no specific or localizing findings.    ASSESSMENT:  1. Nonintractable headache, unspecified chronicity pattern, unspecified headache type    2. Constipation, unspecified constipation type    3. Nausea    4. Drowsy    5. Adverse effect of vaccine, initial encounter    6. Fatigue, unspecified type        PLAN:  Orders Placed This Encounter   • XR Abdomen 2 View   • Comprehensive Metabolic Panel   • CBC & Auto Differential   • Urinalysis & Reflex Micro with Culture if Indicated   • Urine Culture       Chart sent to PCP     PATIENT INSTRUCTIONS:   Lengthy discussion of prescription(s), rehydration, probiotics, local care ,referrals, activity precautions, plan for follow-up , refer  to AVS for specifics. See work/school note     The patient was advised to follow up with primary physician or to recheck with the urgent care clinic sooner if symptoms get worse or if new symptoms appear.    The patient and daughter(s) Maria Elena ,  indicated understanding of the diagnosis and agreed with the plan of care.    Aarti Cates.MD           No

## 2024-05-07 ENCOUNTER — RESULT REVIEW (OUTPATIENT)
Age: 84
End: 2024-05-07

## 2024-05-07 ENCOUNTER — OUTPATIENT (OUTPATIENT)
Dept: OUTPATIENT SERVICES | Facility: HOSPITAL | Age: 84
LOS: 1 days | End: 2024-05-07
Payer: MEDICARE

## 2024-05-07 ENCOUNTER — TRANSCRIPTION ENCOUNTER (OUTPATIENT)
Age: 84
End: 2024-05-07

## 2024-05-07 ENCOUNTER — APPOINTMENT (OUTPATIENT)
Dept: GASTROENTEROLOGY | Facility: HOSPITAL | Age: 84
End: 2024-05-07

## 2024-05-07 VITALS
TEMPERATURE: 98 F | SYSTOLIC BLOOD PRESSURE: 146 MMHG | HEART RATE: 62 BPM | HEIGHT: 67 IN | DIASTOLIC BLOOD PRESSURE: 60 MMHG | RESPIRATION RATE: 20 BRPM | WEIGHT: 154.98 LBS | OXYGEN SATURATION: 100 %

## 2024-05-07 VITALS
HEART RATE: 61 BPM | RESPIRATION RATE: 16 BRPM | DIASTOLIC BLOOD PRESSURE: 65 MMHG | SYSTOLIC BLOOD PRESSURE: 170 MMHG | OXYGEN SATURATION: 99 %

## 2024-05-07 DIAGNOSIS — Z98.890 OTHER SPECIFIED POSTPROCEDURAL STATES: Chronic | ICD-10-CM

## 2024-05-07 DIAGNOSIS — Z95.1 PRESENCE OF AORTOCORONARY BYPASS GRAFT: Chronic | ICD-10-CM

## 2024-05-07 DIAGNOSIS — D12.6 BENIGN NEOPLASM OF COLON, UNSPECIFIED: ICD-10-CM

## 2024-05-07 DIAGNOSIS — Z95.5 PRESENCE OF CORONARY ANGIOPLASTY IMPLANT AND GRAFT: Chronic | ICD-10-CM

## 2024-05-07 LAB — GLUCOSE BLDC GLUCOMTR-MCNC: 166 MG/DL — HIGH (ref 70–99)

## 2024-05-07 PROCEDURE — 82962 GLUCOSE BLOOD TEST: CPT

## 2024-05-07 PROCEDURE — 88305 TISSUE EXAM BY PATHOLOGIST: CPT | Mod: 26

## 2024-05-07 PROCEDURE — 45380 COLONOSCOPY AND BIOPSY: CPT | Mod: GC

## 2024-05-07 PROCEDURE — 45381 COLONOSCOPY SUBMUCOUS NJX: CPT | Mod: GC

## 2024-05-07 PROCEDURE — 45380 COLONOSCOPY AND BIOPSY: CPT

## 2024-05-07 PROCEDURE — 45381 COLONOSCOPY SUBMUCOUS NJX: CPT

## 2024-05-07 PROCEDURE — 88305 TISSUE EXAM BY PATHOLOGIST: CPT

## 2024-05-07 DEVICE — NET RETRV ROT ROTH 2.5MMX230CM: Type: IMPLANTABLE DEVICE | Status: FUNCTIONAL

## 2024-05-07 RX ORDER — LACTOBACILLUS ACIDOPHILUS 100MM CELL
1 CAPSULE ORAL
Refills: 0 | DISCHARGE

## 2024-05-07 RX ORDER — SODIUM CHLORIDE 9 MG/ML
500 INJECTION INTRAMUSCULAR; INTRAVENOUS; SUBCUTANEOUS
Refills: 0 | Status: COMPLETED | OUTPATIENT
Start: 2024-05-07 | End: 2024-05-07

## 2024-05-07 RX ADMIN — SODIUM CHLORIDE 75 MILLILITER(S): 9 INJECTION INTRAMUSCULAR; INTRAVENOUS; SUBCUTANEOUS at 13:34

## 2024-05-07 NOTE — ASU PATIENT PROFILE, ADULT - FALL HARM RISK - UNIVERSAL INTERVENTIONS
Bed in lowest position, wheels locked, appropriate side rails in place/Call bell, personal items and telephone in reach/Instruct patient to call for assistance before getting out of bed or chair/Non-slip footwear when patient is out of bed/[place] to call system/Physically safe environment - no spills, clutter or unnecessary equipment/Purposeful Proactive Rounding/Room/bathroom lighting operational, light cord in reach

## 2024-05-07 NOTE — PRE PROCEDURE NOTE - PRE PROCEDURE EVALUATION
Attending Physician:   Sal                        Procedure: Colonoscopy w/ EMR    Indication for Procedure: large polyp  ________________________________________________________  PAST MEDICAL & SURGICAL HISTORY:  3-vessel CAD      HTN (hypertension)      HLD (hyperlipidemia)      Diabetes mellitus, type 2      BPH (benign prostatic hyperplasia)      GERD (gastroesophageal reflux disease)      Ulcerative colitis      Stented coronary artery      History of babesiosis      Benign neoplasm of colon, unspecified      S/P CABG x 3      S/P coronary artery stent placement      History of colonoscopy        ALLERGIES:  No Known Drug Allergies  CT Dye (Unknown)    HOME MEDICATIONS:  clopidogrel 75 mg oral tablet: 1 tab(s) orally once a day  finasteride 5 mg oral tablet: 1 tab(s) orally once a day  Fish Oil: 1 tablet by mouth daily  glipiZIDE 5 mg oral tablet: 1 tab(s) orally 2 times a day  losartan 100 mg oral tablet: 1 tab(s) orally once a day  metFORMIN 1000 mg oral tablet: 1 tab(s) orally 2 times a day  metoprolol succinate 25 mg oral tablet, extended release: 1 tab(s) orally once a day  Pepcid AC 10 mg oral tablet: 1 tab(s) orally once a day  rosuvastatin 5 mg oral tablet: 1 tab(s) orally once a day (at bedtime)  tamsulosin 0.4 mg oral capsule: 1 cap(s) orally once a day (at bedtime)  Vitamin B12: 1 tablet  by mouth daily    AICD/PPM: [ ] yes   [ ] no    PERTINENT LAB DATA:                      PHYSICAL EXAMINATION:    Height (cm): 170.2  Weight (kg): 70.3  BMI (kg/m2): 24.3  BSA (m2): 1.81T(C): 36.5  HR: --  BP: --  RR: 20  SpO2: --    Constitutional: NAD  Neck:  supple  Respiratory: no respiratory stress, no audible wheezes  Cardiovascular: RR  Gastrointestinal: soft, NT/ND  Extremities: No peripheral edema  Neurological: Alert, no focal deficits  Psychiatric: Normal mood, normal affect  Skin: No rashes      COMMENTS:    The patient is a suitable candidate for the planned procedure unless box checked [ ]  No, explain:

## 2024-05-07 NOTE — PRE-ANESTHESIA EVALUATION ADULT - NSANTHTOBACCOSD_GEN_ALL_CORE
[No Acute Distress] : no acute distress [Well Nourished] : well nourished [Well Developed] : well developed [Well-Appearing] : well-appearing [Normal Voice/Communication] : normal voice/communication [Normal Sclera/Conjunctiva] : normal sclera/conjunctiva [PERRL] : pupils equal round and reactive to light [EOMI] : extraocular movements intact [Normal Outer Ear/Nose] : the outer ears and nose were normal in appearance [Normal Oropharynx] : the oropharynx was normal [Normal TMs] : both tympanic membranes were normal [No JVD] : no jugular venous distention No [No Lymphadenopathy] : no lymphadenopathy [Supple] : supple [Thyroid Normal, No Nodules] : the thyroid was normal and there were no nodules present [No Respiratory Distress] : no respiratory distress  [No Accessory Muscle Use] : no accessory muscle use [Clear to Auscultation] : lungs were clear to auscultation bilaterally [Normal Rate] : normal rate  [Regular Rhythm] : with a regular rhythm [Normal S1, S2] : normal S1 and S2 [No Murmur] : no murmur heard [No Carotid Bruits] : no carotid bruits [No Abdominal Bruit] : a ~M bruit was not heard ~T in the abdomen [No Varicosities] : no varicosities [Pedal Pulses Present] : the pedal pulses are present [No Edema] : there was no peripheral edema [No Palpable Aorta] : no palpable aorta [No Extremity Clubbing/Cyanosis] : no extremity clubbing/cyanosis [Soft] : abdomen soft [Non Tender] : non-tender [Non-distended] : non-distended [No Masses] : no abdominal mass palpated [No HSM] : no HSM [Normal Bowel Sounds] : normal bowel sounds [Normal Supraclavicular Nodes] : no supraclavicular lymphadenopathy [Normal Posterior Cervical Nodes] : no posterior cervical lymphadenopathy [Normal Anterior Cervical Nodes] : no anterior cervical lymphadenopathy [No CVA Tenderness] : no CVA  tenderness [No Spinal Tenderness] : no spinal tenderness [No Joint Swelling] : no joint swelling [Grossly Normal Strength/Tone] : grossly normal strength/tone [No Rash] : no rash [Coordination Grossly Intact] : coordination grossly intact [No Focal Deficits] : no focal deficits [Normal Gait] : normal gait [Deep Tendon Reflexes (DTR)] : deep tendon reflexes were 2+ and symmetric [Speech Grossly Normal] : speech grossly normal [Memory Grossly Normal] : memory grossly normal [Normal Affect] : the affect was normal [Alert and Oriented x3] : oriented to person, place, and time [Normal Mood] : the mood was normal [Normal Insight/Judgement] : insight and judgment were intact

## 2024-05-07 NOTE — ASU DISCHARGE PLAN (ADULT/PEDIATRIC) - NS MD DC FALL RISK RISK
For information on Fall & Injury Prevention, visit: [email or web address] OR  [email or web address] OR  https://www.cdc.gov/steadi/patient.html

## 2024-05-08 RX ORDER — ROSUVASTATIN CALCIUM 5 MG/1
5 TABLET, FILM COATED ORAL
Qty: 90 | Refills: 2 | Status: ACTIVE | COMMUNITY
Start: 2018-04-05 | End: 1900-01-01

## 2024-05-13 LAB — SURGICAL PATHOLOGY STUDY: SIGNIFICANT CHANGE UP

## 2024-05-14 RX ORDER — OMEPRAZOLE 40 MG/1
40 CAPSULE, DELAYED RELEASE ORAL
Qty: 30 | Refills: 3 | Status: DISCONTINUED | COMMUNITY
Start: 2023-04-11 | End: 2024-05-14

## 2024-05-15 ENCOUNTER — APPOINTMENT (OUTPATIENT)
Dept: SURGERY | Facility: CLINIC | Age: 84
End: 2024-05-15
Payer: MEDICARE

## 2024-05-15 VITALS
SYSTOLIC BLOOD PRESSURE: 142 MMHG | OXYGEN SATURATION: 99 % | DIASTOLIC BLOOD PRESSURE: 68 MMHG | HEART RATE: 60 BPM | BODY MASS INDEX: 23.49 KG/M2 | WEIGHT: 155 LBS | HEIGHT: 68 IN | TEMPERATURE: 97.3 F | RESPIRATION RATE: 16 BRPM

## 2024-05-15 DIAGNOSIS — C18.2 MALIGNANT NEOPLASM OF ASCENDING COLON: ICD-10-CM

## 2024-05-15 PROCEDURE — 99204 OFFICE O/P NEW MOD 45 MIN: CPT

## 2024-05-15 NOTE — CONSULT LETTER
[Dear  ___] : Dear  [unfilled], [Consult Letter:] : I had the pleasure of evaluating your patient, [unfilled]. [Please see my note below.] : Please see my note below. [Consult Closing:] : Thank you very much for allowing me to participate in the care of this patient.  If you have any questions, please do not hesitate to contact me. [Sincerely,] : Sincerely, [FreeTextEntry3] : Malcolm Das M.D., F.A.C.S, F.A.S.C.R.S [DrRomeo  ___] : Dr. DEGROOT [DrRomeo ___] : Dr. DEGROOT

## 2024-05-15 NOTE — ASSESSMENT
[FreeTextEntry1] : In summary the patient is a pleasant 84-year-old gentleman with a history of anemia and a newly diagnosed ascending colon cancer.  He has a history of coronary artery disease status post coronary artery bypass and cardiac stents.  He takes Plavix.  He underwent a colonoscopy last year which demonstrated a malignant appearing mass in the ascending colon.  Biopsy demonstrated tubulovillous adenoma.  He subsequently developed a babesiosis and required hospitalization and IV antibiotics for this.  He ultimately recovered and underwent a follow-up colonoscopy last week which demonstrates a malignant appearing mass in the ascending colon.  Biopsy confirms adenocarcinoma.  There is a synchronous benign-appearing polyp 1 fold distal to the mass in the ascending colon.  The mass has been tattooed at the time of his colonoscopy last year.  I had a long conversation with the patient and his wife who is a retired hematologist.  I ordered a CT chest abdomen pelvis and CEA to complete his staging workup.  I recommended he undergo an laparoscopic right hemicolectomy.  I explained the risks benefits and alternatives of surgery including the risks of bleeding infection anastomotic leakage the possible need for an open procedure and also explained that final pathology will determine whether or not adjuvant treatment is recommended.  Final plan for surgery will be made once his staging workup is complete.  We will plan on proceeding with surgery sometime in the next 2 to 3 weeks.

## 2024-05-15 NOTE — PHYSICAL EXAM
[Abdomen Masses] : No abdominal masses [Abdomen Tenderness] : ~T No ~M abdominal tenderness [No HSM] : no hepatosplenomegaly [Exam Deferred] : exam was deferred [Wheezing] : no wheezing was heard [Normal Rate and Rhythm] : normal rate and rhythm [No Rash or Lesion] : No rash or lesion [Alert] : alert [Calm] : calm [de-identified] : nad [de-identified] : nl

## 2024-05-15 NOTE — HISTORY OF PRESENT ILLNESS
[FreeTextEntry1] : Nasima is an 85 y/o male being seen for consultation, Colon cancer  Accompanied by Spouse Nevaeh.  Colonoscopy 05/7/24 (therapeutic procedure for colon polyps in ascending colon, possible FTRD) - One 30 mm polyp in the proximal ascending colon not resected given the concern for malignancy.  Biopsied.  One 25 mm polyp in the ascending colon distal to larger polyp not resected.   Pathology: 1. Colon, ascending, polyp, biopsy - Adenocarcinoma, moderately differentiated, arising from background of tubulovillous adenoma with high grade dysplasia  Today patient reports feeling well.  No abdominal pain.   Has lost 20 lbs in the past 1 year.   unintentional weight loss.  BMs regular once daily sometimes loose, takes probiotics daily.   Occasional rectal bleeding onto tp.  Good appetite.  Does feel prolapsing tissue or swelling which he is able to push in.  Patient endorses no generalized fatigue/tiredness.  No episodes of incontinence of stool or flatus.  Denies n/v, fever or chills.  Patient is on Plavix for stents x2.  No family history of colorectal cancer.

## 2024-05-16 ENCOUNTER — RESULT REVIEW (OUTPATIENT)
Age: 84
End: 2024-05-16

## 2024-05-17 ENCOUNTER — OUTPATIENT (OUTPATIENT)
Dept: OUTPATIENT SERVICES | Facility: HOSPITAL | Age: 84
LOS: 1 days | End: 2024-05-17
Payer: MEDICARE

## 2024-05-17 VITALS
HEART RATE: 66 BPM | HEIGHT: 68 IN | SYSTOLIC BLOOD PRESSURE: 125 MMHG | TEMPERATURE: 98 F | OXYGEN SATURATION: 98 % | WEIGHT: 154.1 LBS | RESPIRATION RATE: 18 BRPM | DIASTOLIC BLOOD PRESSURE: 70 MMHG

## 2024-05-17 DIAGNOSIS — C18.2 MALIGNANT NEOPLASM OF ASCENDING COLON: ICD-10-CM

## 2024-05-17 DIAGNOSIS — Z95.1 PRESENCE OF AORTOCORONARY BYPASS GRAFT: Chronic | ICD-10-CM

## 2024-05-17 DIAGNOSIS — Z95.5 PRESENCE OF CORONARY ANGIOPLASTY IMPLANT AND GRAFT: Chronic | ICD-10-CM

## 2024-05-17 DIAGNOSIS — Z29.9 ENCOUNTER FOR PROPHYLACTIC MEASURES, UNSPECIFIED: ICD-10-CM

## 2024-05-17 DIAGNOSIS — Z98.890 OTHER SPECIFIED POSTPROCEDURAL STATES: Chronic | ICD-10-CM

## 2024-05-17 DIAGNOSIS — I25.10 ATHEROSCLEROTIC HEART DISEASE OF NATIVE CORONARY ARTERY WITHOUT ANGINA PECTORIS: ICD-10-CM

## 2024-05-17 DIAGNOSIS — E11.9 TYPE 2 DIABETES MELLITUS WITHOUT COMPLICATIONS: ICD-10-CM

## 2024-05-17 DIAGNOSIS — C18.9 MALIGNANT NEOPLASM OF COLON, UNSPECIFIED: ICD-10-CM

## 2024-05-17 LAB
ALBUMIN SERPL ELPH-MCNC: 4.1 G/DL — SIGNIFICANT CHANGE UP (ref 3.3–5)
ALP SERPL-CCNC: 61 U/L — SIGNIFICANT CHANGE UP (ref 40–120)
ALT FLD-CCNC: 12 U/L — SIGNIFICANT CHANGE UP (ref 10–45)
ANION GAP SERPL CALC-SCNC: 13 MMOL/L — SIGNIFICANT CHANGE UP (ref 5–17)
AST SERPL-CCNC: 14 U/L — SIGNIFICANT CHANGE UP (ref 10–40)
BILIRUB SERPL-MCNC: 0.3 MG/DL — SIGNIFICANT CHANGE UP (ref 0.2–1.2)
BLD GP AB SCN SERPL QL: NEGATIVE — SIGNIFICANT CHANGE UP
BUN SERPL-MCNC: 26 MG/DL — HIGH (ref 7–23)
CALCIUM SERPL-MCNC: 9.5 MG/DL — SIGNIFICANT CHANGE UP (ref 8.4–10.5)
CHLORIDE SERPL-SCNC: 103 MMOL/L — SIGNIFICANT CHANGE UP (ref 96–108)
CO2 SERPL-SCNC: 22 MMOL/L — SIGNIFICANT CHANGE UP (ref 22–31)
CREAT SERPL-MCNC: 1.23 MG/DL — SIGNIFICANT CHANGE UP (ref 0.5–1.3)
EGFR: 59 ML/MIN/1.73M2 — LOW
GLUCOSE SERPL-MCNC: 170 MG/DL — HIGH (ref 70–99)
HCT VFR BLD CALC: 31 % — LOW (ref 39–50)
HGB BLD-MCNC: 8.9 G/DL — LOW (ref 13–17)
MCHC RBC-ENTMCNC: 19.5 PG — LOW (ref 27–34)
MCHC RBC-ENTMCNC: 28.7 GM/DL — LOW (ref 32–36)
MCV RBC AUTO: 68 FL — LOW (ref 80–100)
NRBC # BLD: 0 /100 WBCS — SIGNIFICANT CHANGE UP (ref 0–0)
PLATELET # BLD AUTO: 282 K/UL — SIGNIFICANT CHANGE UP (ref 150–400)
POTASSIUM SERPL-MCNC: 4.5 MMOL/L — SIGNIFICANT CHANGE UP (ref 3.5–5.3)
POTASSIUM SERPL-SCNC: 4.5 MMOL/L — SIGNIFICANT CHANGE UP (ref 3.5–5.3)
PROT SERPL-MCNC: 6.8 G/DL — SIGNIFICANT CHANGE UP (ref 6–8.3)
RBC # BLD: 4.56 M/UL — SIGNIFICANT CHANGE UP (ref 4.2–5.8)
RBC # FLD: 22.3 % — HIGH (ref 10.3–14.5)
RH IG SCN BLD-IMP: POSITIVE — SIGNIFICANT CHANGE UP
SODIUM SERPL-SCNC: 138 MMOL/L — SIGNIFICANT CHANGE UP (ref 135–145)
WBC # BLD: 5.49 K/UL — SIGNIFICANT CHANGE UP (ref 3.8–10.5)
WBC # FLD AUTO: 5.49 K/UL — SIGNIFICANT CHANGE UP (ref 3.8–10.5)

## 2024-05-17 PROCEDURE — 86900 BLOOD TYPING SEROLOGIC ABO: CPT

## 2024-05-17 PROCEDURE — 36415 COLL VENOUS BLD VENIPUNCTURE: CPT

## 2024-05-17 PROCEDURE — 86850 RBC ANTIBODY SCREEN: CPT

## 2024-05-17 PROCEDURE — 86901 BLOOD TYPING SEROLOGIC RH(D): CPT

## 2024-05-17 PROCEDURE — 82378 CARCINOEMBRYONIC ANTIGEN: CPT

## 2024-05-17 PROCEDURE — 80053 COMPREHEN METABOLIC PANEL: CPT

## 2024-05-17 PROCEDURE — 85027 COMPLETE CBC AUTOMATED: CPT

## 2024-05-17 PROCEDURE — 86803 HEPATITIS C AB TEST: CPT

## 2024-05-17 RX ORDER — CEFOTETAN DISODIUM 1 G
2 VIAL (EA) INJECTION ONCE
Refills: 0 | Status: COMPLETED | OUTPATIENT
Start: 2024-05-22 | End: 2024-05-22

## 2024-05-17 NOTE — H&P PST ADULT - I HAVE PERSONALLY SEEN AND EXAMINED THE PATIENT. THERE HAVE NOT BEEN ANY CHANGES IN THE PATIENT'S HISTORY OR EXAM UNLESS COMMENTED BELOW
During morning rounds per nursing report ,family is coming to see patietn and plans for comfort care.  Will sign off, call if needed    Mechelle Mckeon MD Statement Selected

## 2024-05-17 NOTE — H&P PST ADULT - NSICDXPASTMEDICALHX_GEN_ALL_CORE_FT
PAST MEDICAL HISTORY:  3-vessel CAD     Benign neoplasm of colon, unspecified     BPH (benign prostatic hyperplasia)     Diabetes mellitus, type 2     GERD (gastroesophageal reflux disease)     History of babesiosis     HLD (hyperlipidemia)     HTN (hypertension)     Stented coronary artery     Ulcerative colitis      PAST MEDICAL HISTORY:  3-vessel CAD     Anemia     Benign neoplasm of colon, unspecified     BPH (benign prostatic hyperplasia)     Diabetes mellitus, type 2     GERD (gastroesophageal reflux disease)     Hard of hearing     Heart murmur     History of babesiosis     HLD (hyperlipidemia)     HTN (hypertension)     Stented coronary artery     Ulcerative colitis

## 2024-05-17 NOTE — H&P PST ADULT - NSICDXFAMILYHX_GEN_ALL_CORE_FT
FAMILY HISTORY:  Father  Still living? Unknown  Family history of CVA, Age at diagnosis: Age Unknown  Family hx of hypertension, Age at diagnosis: Age Unknown  FH: type 2 diabetes mellitus, Age at diagnosis: Age Unknown    Mother  Still living? Unknown  Family history of CVA, Age at diagnosis: Age Unknown  Family hx of hypertension, Age at diagnosis: Age Unknown  FH: type 2 diabetes mellitus, Age at diagnosis: Age Unknown    Sibling  Still living? Unknown  Family history of early CAD, Age at diagnosis: Age Unknown

## 2024-05-17 NOTE — H&P PST ADULT - PROBLEM SELECTOR PLAN 4
Last dose plavix 5/17/24 in AM - pt. took this morning prior to PST procedure.  Cardiac eval on 5/20/25.

## 2024-05-17 NOTE — H&P PST ADULT - ASSESSMENT
DASI score: 6.27  DASI activity: gardening, 1 flight of stairs, can walk 2 blocks, mild/mod house chores - takes naps throughout the day  Loose teeth or denture: full upper denture    CAPRINI SCORE [CLOT]    AGE RELATED RISK FACTORS                                                       MOBILITY RELATED FACTORS  [ ] Age 41-60 years                                            (1 Point)                  [ ] Bed rest                                                        (1 Point)  [ ] Age: 61-74 years                                           (2 Points)                 [ ] Plaster cast                                                   (2 Points)  [x ] Age= 75 years                                              (3 Points)                 [ ] Bed bound for more than 72 hours                 (2 Points)    DISEASE RELATED RISK FACTORS                                               GENDER SPECIFIC FACTORS  [ ] Edema in the lower extremities                       (1 Point)                  [ ] Pregnancy                                                     (1 Point)  [ ] Varicose veins                                               (1 Point)                  [ ] Post-partum < 6 weeks                                   (1 Point)             [ ] BMI > 25 Kg/m2                                            (1 Point)                  [ ] Hormonal therapy  or oral contraception          (1 Point)                 [ ] Sepsis (in the previous month)                        (1 Point)                  [ ] History of pregnancy complications                 (1 point)  [ ] Pneumonia or serious lung disease                                               [ ] Unexplained or recurrent                     (1 Point)           (in the previous month)                               (1 Point)  [ ] Abnormal pulmonary function test                     (1 Point)                 SURGERY RELATED RISK FACTORS  [ ] Acute myocardial infarction                              (1 Point)                 [ ]  Section                                             (1 Point)  [ ] Congestive heart failure (in the previous month)  (1 Point)               [ ] Minor surgery                                                  (1 Point)   [ ] Inflammatory bowel disease                             (1 Point)                 [ ] Arthroscopic surgery                                        (2 Points)  [ ] Central venous access                                      (2 Points)                [x ] General surgery lasting more than 45 minutes   (2 Points)       [ ] Stroke (in the previous month)                          (5 Points)               [ ] Elective arthroplasty                                         (5 Points)                                                                                                                                               HEMATOLOGY RELATED FACTORS                                                 TRAUMA RELATED RISK FACTORS  [ ] Prior episodes of VTE                                     (3 Points)                [ ] Fracture of the hip, pelvis, or leg                       (5 Points)  [x ] Positive family history for VTE                         (3 Points)                 [ ] Acute spinal cord injury (in the previous month)  (5 Points)  [ ] Prothrombin 71611 A                                     (3 Points)                 [ ] Paralysis  (less than 1 month)                             (5 Points)  [ ] Factor V Leiden                                             (3 Points)                  [ ] Multiple Trauma within 1 month                        (5 Points)  [ ] Lupus anticoagulants                                     (3 Points)                                                           [ ] Anticardiolipin antibodies                               (3 Points)                                                       [ ] High homocysteine in the blood                      (3 Points)                                             [ ] Other congenital or acquired thrombophilia      (3 Points)                                                [ ] Heparin induced thrombocytopenia                  (3 Points)                                          Total Score [    6      ]    Caprini Score 0 - 2:  Low Risk, No VTE Prophylaxis required for most patients, encourage ambulation  Caprini Score 3 - 6:  At Risk, pharmacologic VTE prophylaxis is indicated for most patients (in the absence of a contraindication)  Caprini Score Greater than or = 7:  High Risk, pharmacologic VTE prophylaxis is indicated for most patients (in the absence of a contraindication)

## 2024-05-17 NOTE — H&P PST ADULT - NSANTHAGERD_ENT_A_CORE
No heavy lifting/No intercourse No heavy lifting/Nothing per vagina/No tub baths/No douching/No tampons/No intercourse Yes

## 2024-05-17 NOTE — H&P PST ADULT - HISTORY OF PRESENT ILLNESS
84 year old male with PMH HTN, HLD, GERD, BPH, CAD s/p PCI intervention x 1 (25 years ago) and CABG x 3 (20 years ago - on plavix), EGD colonoscopy due to anemia in 7/2023 where large adenomatous polyp was removed and biopsied and negative per patient, hospitalized 8/2023 for positive babesiosis infection which was treated, colon polyps s/p colonoscopy 12/2023 where patient states they were unable to remove the polyp due to its position. He now presents today to PST prior to scheduled Colonoscopy Anes on 5/7/24. Patient denies recent fever, chills, chest pain, SOB, palpitations, or recent exposure to COVID-19. Patient states that he exercises every morning for 10 minutes with cardio and works in his yard gardening for 3-4 hours every day and he has no chest pain, shortness of breath, or any other symptoms.    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 Presents to PST prior to scheduled right hemicolectomy on 5/22/24 with Dr. Das. Patient reports anemia and unintentional weight loss of 10 lbs over the past year. Endorses fatigability. Denies chest pain, palpitations, sob/blankenship, dizziness, syncope.

## 2024-05-18 LAB
CEA SERPL-MCNC: 3.7 NG/ML — SIGNIFICANT CHANGE UP (ref 0–3.8)
HCV AB S/CO SERPL IA: 0.09 S/CO — SIGNIFICANT CHANGE UP (ref 0–0.99)
HCV AB SERPL-IMP: SIGNIFICANT CHANGE UP

## 2024-05-20 ENCOUNTER — APPOINTMENT (OUTPATIENT)
Dept: CARDIOLOGY | Facility: CLINIC | Age: 84
End: 2024-05-20
Payer: MEDICARE

## 2024-05-20 ENCOUNTER — NON-APPOINTMENT (OUTPATIENT)
Age: 84
End: 2024-05-20

## 2024-05-20 ENCOUNTER — APPOINTMENT (OUTPATIENT)
Dept: CT IMAGING | Facility: CLINIC | Age: 84
End: 2024-05-20
Payer: MEDICARE

## 2024-05-20 ENCOUNTER — OUTPATIENT (OUTPATIENT)
Dept: OUTPATIENT SERVICES | Facility: HOSPITAL | Age: 84
LOS: 1 days | End: 2024-05-20
Payer: MEDICARE

## 2024-05-20 VITALS
BODY MASS INDEX: 23.57 KG/M2 | WEIGHT: 155 LBS | HEART RATE: 72 BPM | SYSTOLIC BLOOD PRESSURE: 130 MMHG | DIASTOLIC BLOOD PRESSURE: 68 MMHG | OXYGEN SATURATION: 97 %

## 2024-05-20 DIAGNOSIS — I10 ESSENTIAL (PRIMARY) HYPERTENSION: ICD-10-CM

## 2024-05-20 DIAGNOSIS — D12.6 BENIGN NEOPLASM OF COLON, UNSPECIFIED: ICD-10-CM

## 2024-05-20 DIAGNOSIS — Z95.1 PRESENCE OF AORTOCORONARY BYPASS GRAFT: Chronic | ICD-10-CM

## 2024-05-20 DIAGNOSIS — E78.2 MIXED HYPERLIPIDEMIA: ICD-10-CM

## 2024-05-20 DIAGNOSIS — I25.10 ATHEROSCLEROTIC HEART DISEASE OF NATIVE CORONARY ARTERY W/OUT ANGINA PECTORIS: ICD-10-CM

## 2024-05-20 DIAGNOSIS — Z01.818 ENCOUNTER FOR OTHER PREPROCEDURAL EXAMINATION: ICD-10-CM

## 2024-05-20 DIAGNOSIS — R01.1 CARDIAC MURMUR, UNSPECIFIED: ICD-10-CM

## 2024-05-20 PROBLEM — D64.9 ANEMIA, UNSPECIFIED: Chronic | Status: ACTIVE | Noted: 2024-05-17

## 2024-05-20 PROBLEM — H91.90 UNSPECIFIED HEARING LOSS, UNSPECIFIED EAR: Chronic | Status: ACTIVE | Noted: 2024-05-17

## 2024-05-20 PROCEDURE — 74176 CT ABD & PELVIS W/O CONTRAST: CPT

## 2024-05-20 PROCEDURE — 74176 CT ABD & PELVIS W/O CONTRAST: CPT | Mod: 26

## 2024-05-20 PROCEDURE — 71250 CT THORAX DX C-: CPT | Mod: 26

## 2024-05-20 PROCEDURE — G2211 COMPLEX E/M VISIT ADD ON: CPT

## 2024-05-20 PROCEDURE — 99214 OFFICE O/P EST MOD 30 MIN: CPT

## 2024-05-20 PROCEDURE — 93000 ELECTROCARDIOGRAM COMPLETE: CPT

## 2024-05-20 PROCEDURE — 71250 CT THORAX DX C-: CPT

## 2024-05-20 RX ORDER — PEG-3350, SODIUM SULFATE, SODIUM CHLORIDE, POTASSIUM CHLORIDE, SODIUM ASCORBATE AND ASCORBIC ACID 7.5-2.691G
100 KIT ORAL
Qty: 1 | Refills: 0 | Status: DISCONTINUED | COMMUNITY
Start: 2023-09-21 | End: 2024-05-20

## 2024-05-20 RX ORDER — SODIUM SULFATE, POTASSIUM SULFATE AND MAGNESIUM SULFATE 1.6; 3.13; 17.5 G/177ML; G/177ML; G/177ML
17.5-3.13-1.6 SOLUTION ORAL
Qty: 1 | Refills: 0 | Status: DISCONTINUED | COMMUNITY
Start: 2023-02-28 | End: 2024-05-20

## 2024-05-20 RX ORDER — PEG-3350, SODIUM SULFATE, SODIUM CHLORIDE, POTASSIUM CHLORIDE, SODIUM ASCORBATE AND ASCORBIC ACID 7.5-2.691G
100 KIT ORAL
Qty: 1 | Refills: 0 | Status: DISCONTINUED | COMMUNITY
Start: 2023-07-23 | End: 2024-05-20

## 2024-05-20 RX ORDER — CAMPHOR 0.45 %
25 GEL (GRAM) TOPICAL
Qty: 2 | Refills: 0 | Status: DISCONTINUED | COMMUNITY
Start: 2024-05-15 | End: 2024-05-20

## 2024-05-20 RX ORDER — PREDNISONE 50 MG/1
50 TABLET ORAL
Qty: 3 | Refills: 0 | Status: DISCONTINUED | COMMUNITY
Start: 2024-05-15 | End: 2024-05-20

## 2024-05-20 RX ORDER — NEOMYCIN SULFATE 500 MG/1
500 TABLET ORAL
Qty: 3 | Refills: 0 | Status: DISCONTINUED | COMMUNITY
Start: 2024-05-17 | End: 2024-05-20

## 2024-05-20 RX ORDER — POLYETHYLENE GLYCOL 3350 AND ELECTROLYTES WITH LEMON FLAVOR 236; 22.74; 6.74; 5.86; 2.97 G/4L; G/4L; G/4L; G/4L; G/4L
236 POWDER, FOR SOLUTION ORAL
Qty: 1 | Refills: 0 | Status: DISCONTINUED | COMMUNITY
Start: 2024-03-07 | End: 2024-05-20

## 2024-05-20 RX ORDER — SODIUM PICOSULFATE, MAGNESIUM OXIDE, AND ANHYDROUS CITRIC ACID 10; 3.5; 12 MG/160ML; G/160ML; G/160ML
10-3.5-12 MG-GM LIQUID ORAL
Qty: 1 | Refills: 0 | Status: DISCONTINUED | COMMUNITY
Start: 2023-03-01 | End: 2024-05-20

## 2024-05-20 RX ORDER — METRONIDAZOLE 250 MG/1
250 TABLET ORAL
Qty: 3 | Refills: 0 | Status: DISCONTINUED | COMMUNITY
Start: 2024-05-17 | End: 2024-05-20

## 2024-05-21 ENCOUNTER — APPOINTMENT (OUTPATIENT)
Dept: CARDIOLOGY | Facility: CLINIC | Age: 84
End: 2024-05-21

## 2024-05-21 ENCOUNTER — TRANSCRIPTION ENCOUNTER (OUTPATIENT)
Age: 84
End: 2024-05-21

## 2024-05-21 NOTE — CARDIOLOGY SUMMARY
[de-identified] : 3/16/23   sinus rhythm non specific T changes  5/20/24 Sinus Bradycardia similar to prior  [de-identified] : 1/20/22  medium size mild to moderate basal ,mid anterior wall ischemia  EF 52% on chemical stress test  Denies exertional chest pain , he does not want to have cardiac cath, [de-identified] : 6/24/21  Normal EF 55-60%  paradoxical septal motion   Minimal trace  [de-identified] : 11/200255  16-49% mild plaque Right  ,1-15% minimal plaque left CCA

## 2024-05-21 NOTE — ASSESSMENT
[FreeTextEntry1] :  Here today for cardiac pre op cardiac evaluation prior to  Right Hemicolectomy scheduled with Dr Das scheduled 5/22/24  Currently feeling well with no cardiovascular complaints Cardiac testing reviewed will obtain Echo assess severity of AI CAD  CABG Prior PCI  :  abnormal nuclear stress test   showing mild basal  mid anterior wall ischemia , patient says he does not have any exertional chest pain , he does not want to have cardiac cath ,  continue his present medication .  advised the patient to seek medical attention if he develops  chest pain or shortness of breath  Patient is optimized for proposed surgery pending Echo results  Labs to be reviewed with PCP Patient has been holding Plavix total of 5 days to resume post op   DM:, medical management  HLD : continue low cholesterol diet ,change  medication due to side effect , crestor 5 mg po daily ,   HTN: Controlled advised to follow low salt diet ,   Addendum: 5/21/224: Patient optimized for proposed surgery

## 2024-05-21 NOTE — HISTORY OF PRESENT ILLNESS
[FreeTextEntry1] : 83 yo male PMH:  DM,HTN,HLD CABG 3 vessel CABG  anemia ,gastroduodenitis , colon polyps , treated babesiosis here today for cardiac pre op evaluation prior to Right Hemicolectomy Dr Das scheduled 5/22/24   Claims to be feeling well no cardiovascular complaints ,denies any chest pain able to walk 2 to3 blocks without any issues  his BP controlled , as well his sugar Labs with PST

## 2024-05-21 NOTE — PHYSICAL EXAM
[Normal S1, S2] : normal S1, S2 [Soft] : abdomen soft [Non Tender] : non-tender [Normal Gait] : normal gait [No Edema] : no edema [Normal] : moves all extremities, no focal deficits, normal speech [Alert and Oriented] : alert and oriented [de-identified] : +SYS murmur II/VI

## 2024-05-21 NOTE — END OF VISIT
[Time Spent: ___ minutes] : I have spent [unfilled] minutes of time on the encounter. [FreeTextEntry3] : Patient with above hx ,CAD CABG abnormal nuclear with small area of ischemia without exertional angina fair exercise capacity ,  optimal for proposed surgery , benefits outweigh the risk of surgery  , continue BB , discussed with patient and wife who is retired physician , aware of risks and benefits in his clinical scenario

## 2024-05-22 ENCOUNTER — APPOINTMENT (OUTPATIENT)
Dept: SURGERY | Facility: HOSPITAL | Age: 84
End: 2024-05-22
Payer: MEDICARE

## 2024-05-22 ENCOUNTER — RESULT REVIEW (OUTPATIENT)
Age: 84
End: 2024-05-22

## 2024-05-22 ENCOUNTER — INPATIENT (INPATIENT)
Facility: HOSPITAL | Age: 84
LOS: 8 days | Discharge: HOME | DRG: 330 | End: 2024-05-31
Attending: COLON & RECTAL SURGERY | Admitting: COLON & RECTAL SURGERY
Payer: MEDICARE

## 2024-05-22 VITALS
OXYGEN SATURATION: 99 % | DIASTOLIC BLOOD PRESSURE: 73 MMHG | HEIGHT: 68 IN | TEMPERATURE: 98 F | WEIGHT: 154.1 LBS | SYSTOLIC BLOOD PRESSURE: 149 MMHG | RESPIRATION RATE: 18 BRPM | HEART RATE: 66 BPM

## 2024-05-22 DIAGNOSIS — Z95.1 PRESENCE OF AORTOCORONARY BYPASS GRAFT: Chronic | ICD-10-CM

## 2024-05-22 DIAGNOSIS — C18.2 MALIGNANT NEOPLASM OF ASCENDING COLON: ICD-10-CM

## 2024-05-22 DIAGNOSIS — Z95.5 PRESENCE OF CORONARY ANGIOPLASTY IMPLANT AND GRAFT: Chronic | ICD-10-CM

## 2024-05-22 DIAGNOSIS — Z98.890 OTHER SPECIFIED POSTPROCEDURAL STATES: Chronic | ICD-10-CM

## 2024-05-22 LAB
ANION GAP SERPL CALC-SCNC: 13 MMOL/L — SIGNIFICANT CHANGE UP (ref 5–17)
BUN SERPL-MCNC: 21 MG/DL — SIGNIFICANT CHANGE UP (ref 7–23)
CALCIUM SERPL-MCNC: 8 MG/DL — LOW (ref 8.4–10.5)
CHLORIDE SERPL-SCNC: 103 MMOL/L — SIGNIFICANT CHANGE UP (ref 96–108)
CO2 SERPL-SCNC: 21 MMOL/L — LOW (ref 22–31)
CREAT SERPL-MCNC: 1.15 MG/DL — SIGNIFICANT CHANGE UP (ref 0.5–1.3)
EGFR: 64 ML/MIN/1.73M2 — SIGNIFICANT CHANGE UP
GAS PNL BLDA: SIGNIFICANT CHANGE UP
GLUCOSE BLDC GLUCOMTR-MCNC: 183 MG/DL — HIGH (ref 70–99)
GLUCOSE BLDC GLUCOMTR-MCNC: 213 MG/DL — HIGH (ref 70–99)
GLUCOSE BLDC GLUCOMTR-MCNC: 263 MG/DL — HIGH (ref 70–99)
GLUCOSE BLDC GLUCOMTR-MCNC: 331 MG/DL — HIGH (ref 70–99)
GLUCOSE SERPL-MCNC: 190 MG/DL — HIGH (ref 70–99)
HCT VFR BLD CALC: 30.6 % — LOW (ref 39–50)
HGB BLD-MCNC: 8.8 G/DL — LOW (ref 13–17)
MAGNESIUM SERPL-MCNC: 2.2 MG/DL — SIGNIFICANT CHANGE UP (ref 1.6–2.6)
MCHC RBC-ENTMCNC: 20.5 PG — LOW (ref 27–34)
MCHC RBC-ENTMCNC: 28.8 GM/DL — LOW (ref 32–36)
MCV RBC AUTO: 71.3 FL — LOW (ref 80–100)
NRBC # BLD: 0 /100 WBCS — SIGNIFICANT CHANGE UP (ref 0–0)
PHOSPHATE SERPL-MCNC: 3.3 MG/DL — SIGNIFICANT CHANGE UP (ref 2.5–4.5)
PLATELET # BLD AUTO: 215 K/UL — SIGNIFICANT CHANGE UP (ref 150–400)
POTASSIUM SERPL-MCNC: 4.2 MMOL/L — SIGNIFICANT CHANGE UP (ref 3.5–5.3)
POTASSIUM SERPL-SCNC: 4.2 MMOL/L — SIGNIFICANT CHANGE UP (ref 3.5–5.3)
RBC # BLD: 4.29 M/UL — SIGNIFICANT CHANGE UP (ref 4.2–5.8)
RBC # FLD: 27.1 % — HIGH (ref 10.3–14.5)
SODIUM SERPL-SCNC: 137 MMOL/L — SIGNIFICANT CHANGE UP (ref 135–145)
WBC # BLD: 7.2 K/UL — SIGNIFICANT CHANGE UP (ref 3.8–10.5)
WBC # FLD AUTO: 7.2 K/UL — SIGNIFICANT CHANGE UP (ref 3.8–10.5)

## 2024-05-22 PROCEDURE — 88341 IMHCHEM/IMCYTCHM EA ADD ANTB: CPT | Mod: 26

## 2024-05-22 PROCEDURE — 88309 TISSUE EXAM BY PATHOLOGIST: CPT | Mod: 26

## 2024-05-22 PROCEDURE — 88342 IMHCHEM/IMCYTCHM 1ST ANTB: CPT | Mod: 26

## 2024-05-22 PROCEDURE — 44204 LAPARO PARTIAL COLECTOMY: CPT

## 2024-05-22 DEVICE — STAPLER COVIDIEN GIA 80-3.0MM PURPLE: Type: IMPLANTABLE DEVICE | Status: FUNCTIONAL

## 2024-05-22 DEVICE — STAPLER COVIDIEN GIA 80-3.0MM PURPLE RELOAD: Type: IMPLANTABLE DEVICE | Status: FUNCTIONAL

## 2024-05-22 DEVICE — VISTASEAL FIBRIN HUMAN 4ML: Type: IMPLANTABLE DEVICE | Status: FUNCTIONAL

## 2024-05-22 DEVICE — STAPLER COVIDIEN ENDO GIA 45MM GREY RELOAD: Type: IMPLANTABLE DEVICE | Status: FUNCTIONAL

## 2024-05-22 RX ORDER — FINASTERIDE 5 MG/1
1 TABLET, FILM COATED ORAL
Refills: 0 | DISCHARGE

## 2024-05-22 RX ORDER — SODIUM CHLORIDE 9 MG/ML
1000 INJECTION, SOLUTION INTRAVENOUS
Refills: 0 | Status: DISCONTINUED | OUTPATIENT
Start: 2024-05-22 | End: 2024-05-22

## 2024-05-22 RX ORDER — ONDANSETRON 8 MG/1
4 TABLET, FILM COATED ORAL ONCE
Refills: 0 | Status: DISCONTINUED | OUTPATIENT
Start: 2024-05-22 | End: 2024-05-22

## 2024-05-22 RX ORDER — TAMSULOSIN HYDROCHLORIDE 0.4 MG/1
1 CAPSULE ORAL
Refills: 0 | DISCHARGE

## 2024-05-22 RX ORDER — PANTOPRAZOLE SODIUM 20 MG/1
40 TABLET, DELAYED RELEASE ORAL DAILY
Refills: 0 | Status: DISCONTINUED | OUTPATIENT
Start: 2024-05-22 | End: 2024-05-25

## 2024-05-22 RX ORDER — CELECOXIB 200 MG/1
400 CAPSULE ORAL ONCE
Refills: 0 | Status: COMPLETED | OUTPATIENT
Start: 2024-05-22 | End: 2024-05-22

## 2024-05-22 RX ORDER — ACETAMINOPHEN 500 MG
1000 TABLET ORAL EVERY 6 HOURS
Refills: 0 | Status: COMPLETED | OUTPATIENT
Start: 2024-05-22 | End: 2024-05-23

## 2024-05-22 RX ORDER — METOPROLOL TARTRATE 50 MG
1 TABLET ORAL
Refills: 0 | DISCHARGE

## 2024-05-22 RX ORDER — SODIUM CHLORIDE 9 MG/ML
1000 INJECTION, SOLUTION INTRAVENOUS
Refills: 0 | Status: DISCONTINUED | OUTPATIENT
Start: 2024-05-22 | End: 2024-05-23

## 2024-05-22 RX ORDER — LOSARTAN POTASSIUM 100 MG/1
100 TABLET, FILM COATED ORAL DAILY
Refills: 0 | Status: DISCONTINUED | OUTPATIENT
Start: 2024-05-22 | End: 2024-05-23

## 2024-05-22 RX ORDER — HYDROMORPHONE HYDROCHLORIDE 2 MG/ML
0.2 INJECTION INTRAMUSCULAR; INTRAVENOUS; SUBCUTANEOUS
Refills: 0 | Status: DISCONTINUED | OUTPATIENT
Start: 2024-05-22 | End: 2024-05-22

## 2024-05-22 RX ORDER — LOSARTAN POTASSIUM 100 MG/1
1 TABLET, FILM COATED ORAL
Refills: 0 | DISCHARGE

## 2024-05-22 RX ORDER — DEXTROSE 50 % IN WATER 50 %
25 SYRINGE (ML) INTRAVENOUS ONCE
Refills: 0 | Status: DISCONTINUED | OUTPATIENT
Start: 2024-05-22 | End: 2024-05-23

## 2024-05-22 RX ORDER — CHLORHEXIDINE GLUCONATE 213 G/1000ML
1 SOLUTION TOPICAL ONCE
Refills: 0 | Status: DISCONTINUED | OUTPATIENT
Start: 2024-05-22 | End: 2024-05-22

## 2024-05-22 RX ORDER — TAMSULOSIN HYDROCHLORIDE 0.4 MG/1
0.4 CAPSULE ORAL AT BEDTIME
Refills: 0 | Status: DISCONTINUED | OUTPATIENT
Start: 2024-05-22 | End: 2024-05-31

## 2024-05-22 RX ORDER — ATORVASTATIN CALCIUM 80 MG/1
20 TABLET, FILM COATED ORAL AT BEDTIME
Refills: 0 | Status: DISCONTINUED | OUTPATIENT
Start: 2024-05-22 | End: 2024-05-31

## 2024-05-22 RX ORDER — GLUCAGON INJECTION, SOLUTION 0.5 MG/.1ML
1 INJECTION, SOLUTION SUBCUTANEOUS ONCE
Refills: 0 | Status: DISCONTINUED | OUTPATIENT
Start: 2024-05-22 | End: 2024-05-23

## 2024-05-22 RX ORDER — DEXTROSE 50 % IN WATER 50 %
12.5 SYRINGE (ML) INTRAVENOUS ONCE
Refills: 0 | Status: DISCONTINUED | OUTPATIENT
Start: 2024-05-22 | End: 2024-05-23

## 2024-05-22 RX ORDER — LIDOCAINE HCL 20 MG/ML
0.2 VIAL (ML) INJECTION ONCE
Refills: 0 | Status: DISCONTINUED | OUTPATIENT
Start: 2024-05-22 | End: 2024-05-22

## 2024-05-22 RX ORDER — MAGNESIUM OXIDE 400 MG ORAL TABLET 241.3 MG
1000 TABLET ORAL
Refills: 0 | Status: DISCONTINUED | OUTPATIENT
Start: 2024-05-22 | End: 2024-05-23

## 2024-05-22 RX ORDER — OXYCODONE HYDROCHLORIDE 5 MG/1
2.5 TABLET ORAL EVERY 4 HOURS
Refills: 0 | Status: DISCONTINUED | OUTPATIENT
Start: 2024-05-22 | End: 2024-05-28

## 2024-05-22 RX ORDER — INSULIN LISPRO 100/ML
VIAL (ML) SUBCUTANEOUS
Refills: 0 | Status: DISCONTINUED | OUTPATIENT
Start: 2024-05-22 | End: 2024-05-23

## 2024-05-22 RX ORDER — FAMOTIDINE 10 MG/ML
1 INJECTION INTRAVENOUS
Refills: 0 | DISCHARGE

## 2024-05-22 RX ORDER — DEXTROSE 10 % IN WATER 10 %
125 INTRAVENOUS SOLUTION INTRAVENOUS ONCE
Refills: 0 | Status: DISCONTINUED | OUTPATIENT
Start: 2024-05-22 | End: 2024-05-23

## 2024-05-22 RX ORDER — PREGABALIN 225 MG/1
0 CAPSULE ORAL
Refills: 0 | DISCHARGE

## 2024-05-22 RX ORDER — ROSUVASTATIN CALCIUM 5 MG/1
1 TABLET ORAL
Refills: 0 | DISCHARGE

## 2024-05-22 RX ORDER — OMEGA-3 ACID ETHYL ESTERS 1 G
0 CAPSULE ORAL
Refills: 0 | DISCHARGE

## 2024-05-22 RX ORDER — FINASTERIDE 5 MG/1
5 TABLET, FILM COATED ORAL DAILY
Refills: 0 | Status: DISCONTINUED | OUTPATIENT
Start: 2024-05-22 | End: 2024-05-31

## 2024-05-22 RX ORDER — HEPARIN SODIUM 5000 [USP'U]/ML
5000 INJECTION INTRAVENOUS; SUBCUTANEOUS EVERY 8 HOURS
Refills: 0 | Status: DISCONTINUED | OUTPATIENT
Start: 2024-05-22 | End: 2024-05-23

## 2024-05-22 RX ORDER — METFORMIN HYDROCHLORIDE 850 MG/1
1 TABLET ORAL
Refills: 0 | DISCHARGE

## 2024-05-22 RX ORDER — DEXTROSE 50 % IN WATER 50 %
15 SYRINGE (ML) INTRAVENOUS ONCE
Refills: 0 | Status: DISCONTINUED | OUTPATIENT
Start: 2024-05-22 | End: 2024-05-23

## 2024-05-22 RX ORDER — SODIUM CHLORIDE 9 MG/ML
3 INJECTION INTRAMUSCULAR; INTRAVENOUS; SUBCUTANEOUS EVERY 8 HOURS
Refills: 0 | Status: DISCONTINUED | OUTPATIENT
Start: 2024-05-22 | End: 2024-05-22

## 2024-05-22 RX ORDER — METOPROLOL TARTRATE 50 MG
25 TABLET ORAL DAILY
Refills: 0 | Status: DISCONTINUED | OUTPATIENT
Start: 2024-05-22 | End: 2024-05-23

## 2024-05-22 RX ORDER — OXYCODONE HYDROCHLORIDE 5 MG/1
5 TABLET ORAL EVERY 4 HOURS
Refills: 0 | Status: DISCONTINUED | OUTPATIENT
Start: 2024-05-22 | End: 2024-05-28

## 2024-05-22 RX ORDER — LACTOBACILLUS ACIDOPHILUS 100MM CELL
1 CAPSULE ORAL
Refills: 0 | DISCHARGE

## 2024-05-22 RX ADMIN — TAMSULOSIN HYDROCHLORIDE 0.4 MILLIGRAM(S): 0.4 CAPSULE ORAL at 21:25

## 2024-05-22 RX ADMIN — Medication 400 MILLIGRAM(S): at 17:43

## 2024-05-22 RX ADMIN — HEPARIN SODIUM 5000 UNIT(S): 5000 INJECTION INTRAVENOUS; SUBCUTANEOUS at 21:25

## 2024-05-22 RX ADMIN — Medication 2: at 16:40

## 2024-05-22 RX ADMIN — Medication 400 MILLIGRAM(S): at 23:32

## 2024-05-22 RX ADMIN — Medication 1000 MILLIGRAM(S): at 18:13

## 2024-05-22 RX ADMIN — SODIUM CHLORIDE 75 MILLILITER(S): 9 INJECTION, SOLUTION INTRAVENOUS at 15:00

## 2024-05-22 RX ADMIN — ATORVASTATIN CALCIUM 20 MILLIGRAM(S): 80 TABLET, FILM COATED ORAL at 21:26

## 2024-05-22 RX ADMIN — CELECOXIB 400 MILLIGRAM(S): 200 CAPSULE ORAL at 10:46

## 2024-05-22 NOTE — BRIEF OPERATIVE NOTE - OPERATION/FINDINGS
Known ascending colon adenocarcinoma identified along with large polyp. Uncomplicated Laparoscopic Right Hemicolectomy.

## 2024-05-22 NOTE — PHYSICAL THERAPY INITIAL EVALUATION ADULT - ADDITIONAL COMMENTS
as per pt: PTA pt was living in a PH + Stairs and was independent in all functional mobility and ADL's. no AD for gait. lives with wife.

## 2024-05-22 NOTE — PHYSICAL THERAPY INITIAL EVALUATION ADULT - PERTINENT HX OF CURRENT PROBLEM, REHAB EVAL
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 Presents right hemicolectomy on 5/22/24 with Dr. Das. Patient reports anemia and unintentional weight loss of 10 lbs over the past year. Endorses fatigability. Denies chest pain, palpitations, sob/blankenship, dizziness, syncope.

## 2024-05-22 NOTE — PATIENT PROFILE ADULT - FALL HARM RISK - UNIVERSAL INTERVENTIONS
Bed in lowest position, wheels locked, appropriate side rails in place/Call bell, personal items and telephone in reach/Instruct patient to call for assistance before getting out of bed or chair/Non-slip footwear when patient is out of bed/[place] to call system/Physically safe environment - no spills, clutter or unnecessary equipment/Purposeful Proactive Rounding/Room/bathroom lighting operational, light cord in reach

## 2024-05-23 LAB
ANION GAP SERPL CALC-SCNC: 11 MMOL/L — SIGNIFICANT CHANGE UP (ref 5–17)
APTT BLD: 28.7 SEC — SIGNIFICANT CHANGE UP (ref 24.5–35.6)
BASE EXCESS BLDV CALC-SCNC: -1.1 MMOL/L — SIGNIFICANT CHANGE UP (ref -2–3)
BASE EXCESS BLDV CALC-SCNC: 0 MMOL/L — SIGNIFICANT CHANGE UP (ref -2–3)
BLD GP AB SCN SERPL QL: NEGATIVE — SIGNIFICANT CHANGE UP
BUN SERPL-MCNC: 24 MG/DL — HIGH (ref 7–23)
CA-I SERPL-SCNC: 1.15 MMOL/L — SIGNIFICANT CHANGE UP (ref 1.15–1.33)
CA-I SERPL-SCNC: 1.17 MMOL/L — SIGNIFICANT CHANGE UP (ref 1.15–1.33)
CALCIUM SERPL-MCNC: 8.1 MG/DL — LOW (ref 8.4–10.5)
CHLORIDE BLDV-SCNC: 107 MMOL/L — SIGNIFICANT CHANGE UP (ref 96–108)
CHLORIDE BLDV-SCNC: 109 MMOL/L — HIGH (ref 96–108)
CHLORIDE SERPL-SCNC: 105 MMOL/L — SIGNIFICANT CHANGE UP (ref 96–108)
CO2 BLDV-SCNC: 26 MMOL/L — SIGNIFICANT CHANGE UP (ref 22–26)
CO2 BLDV-SCNC: 26 MMOL/L — SIGNIFICANT CHANGE UP (ref 22–26)
CO2 SERPL-SCNC: 22 MMOL/L — SIGNIFICANT CHANGE UP (ref 22–31)
CREAT SERPL-MCNC: 1.45 MG/DL — HIGH (ref 0.5–1.3)
EGFR: 48 ML/MIN/1.73M2 — LOW
GAS PNL BLDV: 136 MMOL/L — SIGNIFICANT CHANGE UP (ref 136–145)
GAS PNL BLDV: 137 MMOL/L — SIGNIFICANT CHANGE UP (ref 136–145)
GAS PNL BLDV: SIGNIFICANT CHANGE UP
GAS PNL BLDV: SIGNIFICANT CHANGE UP
GLUCOSE BLDC GLUCOMTR-MCNC: 221 MG/DL — HIGH (ref 70–99)
GLUCOSE BLDC GLUCOMTR-MCNC: 223 MG/DL — HIGH (ref 70–99)
GLUCOSE BLDC GLUCOMTR-MCNC: 225 MG/DL — HIGH (ref 70–99)
GLUCOSE BLDV-MCNC: 218 MG/DL — HIGH (ref 70–99)
GLUCOSE BLDV-MCNC: 223 MG/DL — HIGH (ref 70–99)
GLUCOSE SERPL-MCNC: 200 MG/DL — HIGH (ref 70–99)
HCO3 BLDV-SCNC: 25 MMOL/L — SIGNIFICANT CHANGE UP (ref 22–29)
HCO3 BLDV-SCNC: 25 MMOL/L — SIGNIFICANT CHANGE UP (ref 22–29)
HCT VFR BLD CALC: 25.6 % — LOW (ref 39–50)
HCT VFR BLD CALC: 26.2 % — LOW (ref 39–50)
HCT VFR BLD CALC: 27 % — LOW (ref 39–50)
HCT VFR BLD CALC: 27.8 % — LOW (ref 39–50)
HCT VFR BLD CALC: 27.9 % — LOW (ref 39–50)
HCT VFR BLDA CALC: 27 % — LOW (ref 39–51)
HCT VFR BLDA CALC: 29 % — LOW (ref 39–51)
HGB BLD CALC-MCNC: 8.9 G/DL — LOW (ref 12.6–17.4)
HGB BLD CALC-MCNC: 9.5 G/DL — LOW (ref 12.6–17.4)
HGB BLD-MCNC: 7.5 G/DL — LOW (ref 13–17)
HGB BLD-MCNC: 7.7 G/DL — LOW (ref 13–17)
HGB BLD-MCNC: 8 G/DL — LOW (ref 13–17)
HGB BLD-MCNC: 8.3 G/DL — LOW (ref 13–17)
HGB BLD-MCNC: 8.5 G/DL — LOW (ref 13–17)
HOROWITZ INDEX BLDV+IHG-RTO: 21 — SIGNIFICANT CHANGE UP
HOROWITZ INDEX BLDV+IHG-RTO: 21 — SIGNIFICANT CHANGE UP
INR BLD: 1.03 RATIO — SIGNIFICANT CHANGE UP (ref 0.85–1.18)
LACTATE BLDV-MCNC: 0.8 MMOL/L — SIGNIFICANT CHANGE UP (ref 0.5–2)
LACTATE BLDV-MCNC: 2.4 MMOL/L — HIGH (ref 0.5–2)
MAGNESIUM SERPL-MCNC: 2.3 MG/DL — SIGNIFICANT CHANGE UP (ref 1.6–2.6)
MCHC RBC-ENTMCNC: 20.7 PG — LOW (ref 27–34)
MCHC RBC-ENTMCNC: 20.8 PG — LOW (ref 27–34)
MCHC RBC-ENTMCNC: 21.2 PG — LOW (ref 27–34)
MCHC RBC-ENTMCNC: 22.1 PG — LOW (ref 27–34)
MCHC RBC-ENTMCNC: 22.5 PG — LOW (ref 27–34)
MCHC RBC-ENTMCNC: 28.7 GM/DL — LOW (ref 32–36)
MCHC RBC-ENTMCNC: 29.3 GM/DL — LOW (ref 32–36)
MCHC RBC-ENTMCNC: 29.4 GM/DL — LOW (ref 32–36)
MCHC RBC-ENTMCNC: 30.6 GM/DL — LOW (ref 32–36)
MCHC RBC-ENTMCNC: 30.7 GM/DL — LOW (ref 32–36)
MCV RBC AUTO: 70.5 FL — LOW (ref 80–100)
MCV RBC AUTO: 72 FL — LOW (ref 80–100)
MCV RBC AUTO: 72.4 FL — LOW (ref 80–100)
MCV RBC AUTO: 72.7 FL — LOW (ref 80–100)
MCV RBC AUTO: 73.2 FL — LOW (ref 80–100)
NRBC # BLD: 0 /100 WBCS — SIGNIFICANT CHANGE UP (ref 0–0)
PCO2 BLDV: 40 MMHG — LOW (ref 42–55)
PCO2 BLDV: 46 MMHG — SIGNIFICANT CHANGE UP (ref 42–55)
PH BLDV: 7.34 — SIGNIFICANT CHANGE UP (ref 7.32–7.43)
PH BLDV: 7.4 — SIGNIFICANT CHANGE UP (ref 7.32–7.43)
PHOSPHATE SERPL-MCNC: 4.3 MG/DL — SIGNIFICANT CHANGE UP (ref 2.5–4.5)
PLATELET # BLD AUTO: 189 K/UL — SIGNIFICANT CHANGE UP (ref 150–400)
PLATELET # BLD AUTO: 194 K/UL — SIGNIFICANT CHANGE UP (ref 150–400)
PLATELET # BLD AUTO: 197 K/UL — SIGNIFICANT CHANGE UP (ref 150–400)
PLATELET # BLD AUTO: 213 K/UL — SIGNIFICANT CHANGE UP (ref 150–400)
PLATELET # BLD AUTO: 216 K/UL — SIGNIFICANT CHANGE UP (ref 150–400)
PO2 BLDV: 30 MMHG — SIGNIFICANT CHANGE UP (ref 25–45)
PO2 BLDV: 59 MMHG — HIGH (ref 25–45)
POTASSIUM BLDV-SCNC: 4.7 MMOL/L — SIGNIFICANT CHANGE UP (ref 3.5–5.1)
POTASSIUM BLDV-SCNC: 4.9 MMOL/L — SIGNIFICANT CHANGE UP (ref 3.5–5.1)
POTASSIUM SERPL-MCNC: 4.6 MMOL/L — SIGNIFICANT CHANGE UP (ref 3.5–5.3)
POTASSIUM SERPL-SCNC: 4.6 MMOL/L — SIGNIFICANT CHANGE UP (ref 3.5–5.3)
PROTHROM AB SERPL-ACNC: 11.3 SEC — SIGNIFICANT CHANGE UP (ref 9.5–13)
RAPIDTEG MAXIMUM AMPLITUDE: 62.9 MM — SIGNIFICANT CHANGE UP (ref 52–70)
RAPIDTEG MAXIMUM AMPLITUDE: 63.6 MM — SIGNIFICANT CHANGE UP (ref 52–70)
RBC # BLD: 3.63 M/UL — LOW (ref 4.2–5.8)
RBC # BLD: 3.64 M/UL — LOW (ref 4.2–5.8)
RBC # BLD: 3.69 M/UL — LOW (ref 4.2–5.8)
RBC # BLD: 3.84 M/UL — LOW (ref 4.2–5.8)
RBC # BLD: 3.84 M/UL — LOW (ref 4.2–5.8)
RBC # FLD: 25.8 % — HIGH (ref 10.3–14.5)
RBC # FLD: 26.8 % — HIGH (ref 10.3–14.5)
RBC # FLD: 27 % — HIGH (ref 10.3–14.5)
RBC # FLD: SIGNIFICANT CHANGE UP (ref 10.3–14.5)
RBC # FLD: SIGNIFICANT CHANGE UP (ref 10.3–14.5)
RH IG SCN BLD-IMP: POSITIVE — SIGNIFICANT CHANGE UP
SAO2 % BLDV: 46.6 % — LOW (ref 67–88)
SAO2 % BLDV: 91.9 % — HIGH (ref 67–88)
SODIUM SERPL-SCNC: 138 MMOL/L — SIGNIFICANT CHANGE UP (ref 135–145)
TEG FUNCTIONAL FIBRINOGEN: 18.3 MM — SIGNIFICANT CHANGE UP (ref 15–32)
TEG FUNCTIONAL FIBRINOGEN: 18.7 MM — SIGNIFICANT CHANGE UP (ref 15–32)
TEG LY30 (LYSIS): 0 % — SIGNIFICANT CHANGE UP (ref 0–2.6)
TEG LY30 (LYSIS): 0 % — SIGNIFICANT CHANGE UP (ref 0–2.6)
TEG REACTION TIME: 13.5 MIN — HIGH (ref 4.6–9.1)
TEG REACTION TIME: 14.3 MIN — HIGH (ref 4.6–9.1)
WBC # BLD: 6.15 K/UL — SIGNIFICANT CHANGE UP (ref 3.8–10.5)
WBC # BLD: 6.24 K/UL — SIGNIFICANT CHANGE UP (ref 3.8–10.5)
WBC # BLD: 7.9 K/UL — SIGNIFICANT CHANGE UP (ref 3.8–10.5)
WBC # BLD: 7.91 K/UL — SIGNIFICANT CHANGE UP (ref 3.8–10.5)
WBC # BLD: 8.46 K/UL — SIGNIFICANT CHANGE UP (ref 3.8–10.5)
WBC # FLD AUTO: 6.15 K/UL — SIGNIFICANT CHANGE UP (ref 3.8–10.5)
WBC # FLD AUTO: 6.24 K/UL — SIGNIFICANT CHANGE UP (ref 3.8–10.5)
WBC # FLD AUTO: 7.9 K/UL — SIGNIFICANT CHANGE UP (ref 3.8–10.5)
WBC # FLD AUTO: 7.91 K/UL — SIGNIFICANT CHANGE UP (ref 3.8–10.5)
WBC # FLD AUTO: 8.46 K/UL — SIGNIFICANT CHANGE UP (ref 3.8–10.5)

## 2024-05-23 PROCEDURE — 99223 1ST HOSP IP/OBS HIGH 75: CPT | Mod: GC

## 2024-05-23 PROCEDURE — 99222 1ST HOSP IP/OBS MODERATE 55: CPT | Mod: GC

## 2024-05-23 PROCEDURE — 74177 CT ABD & PELVIS W/CONTRAST: CPT | Mod: 26

## 2024-05-23 DEVICE — NET RETRV ROT ROTH 2.5MMX230CM: Type: IMPLANTABLE DEVICE | Status: FUNCTIONAL

## 2024-05-23 RX ORDER — SODIUM CHLORIDE 9 MG/ML
1000 INJECTION, SOLUTION INTRAVENOUS
Refills: 0 | Status: DISCONTINUED | OUTPATIENT
Start: 2024-05-23 | End: 2024-05-23

## 2024-05-23 RX ORDER — INSULIN LISPRO 100/ML
VIAL (ML) SUBCUTANEOUS EVERY 6 HOURS
Refills: 0 | Status: DISCONTINUED | OUTPATIENT
Start: 2024-05-23 | End: 2024-05-24

## 2024-05-23 RX ORDER — HYDROCORTISONE 20 MG
200 TABLET ORAL ONCE
Refills: 0 | Status: COMPLETED | OUTPATIENT
Start: 2024-05-23 | End: 2024-05-23

## 2024-05-23 RX ORDER — CHLORHEXIDINE GLUCONATE 213 G/1000ML
1 SOLUTION TOPICAL
Refills: 0 | Status: DISCONTINUED | OUTPATIENT
Start: 2024-05-23 | End: 2024-05-31

## 2024-05-23 RX ORDER — SODIUM CHLORIDE 9 MG/ML
1000 INJECTION, SOLUTION INTRAVENOUS
Refills: 0 | Status: DISCONTINUED | OUTPATIENT
Start: 2024-05-23 | End: 2024-05-25

## 2024-05-23 RX ORDER — HYDROCORTISONE 20 MG
200 TABLET ORAL EVERY 6 HOURS
Refills: 0 | Status: DISCONTINUED | OUTPATIENT
Start: 2024-05-23 | End: 2024-05-23

## 2024-05-23 RX ORDER — ACETAMINOPHEN 500 MG
1000 TABLET ORAL EVERY 6 HOURS
Refills: 0 | Status: DISCONTINUED | OUTPATIENT
Start: 2024-05-23 | End: 2024-05-31

## 2024-05-23 RX ORDER — ASPIRIN/CALCIUM CARB/MAGNESIUM 324 MG
81 TABLET ORAL DAILY
Refills: 0 | Status: DISCONTINUED | OUTPATIENT
Start: 2024-05-23 | End: 2024-05-23

## 2024-05-23 RX ORDER — DIPHENHYDRAMINE HCL 50 MG
50 CAPSULE ORAL ONCE
Refills: 0 | Status: COMPLETED | OUTPATIENT
Start: 2024-05-23 | End: 2024-05-23

## 2024-05-23 RX ADMIN — SODIUM CHLORIDE 100 MILLILITER(S): 9 INJECTION, SOLUTION INTRAVENOUS at 18:00

## 2024-05-23 RX ADMIN — Medication 1000 MILLIGRAM(S): at 00:00

## 2024-05-23 RX ADMIN — Medication 81 MILLIGRAM(S): at 11:36

## 2024-05-23 RX ADMIN — Medication 2: at 17:59

## 2024-05-23 RX ADMIN — Medication 2: at 09:37

## 2024-05-23 RX ADMIN — PANTOPRAZOLE SODIUM 40 MILLIGRAM(S): 20 TABLET, DELAYED RELEASE ORAL at 11:32

## 2024-05-23 RX ADMIN — Medication 50 MILLIGRAM(S): at 16:14

## 2024-05-23 RX ADMIN — Medication 200 MILLIGRAM(S): at 16:13

## 2024-05-23 RX ADMIN — Medication 1000 MILLIGRAM(S): at 12:02

## 2024-05-23 RX ADMIN — Medication 400 MILLIGRAM(S): at 05:41

## 2024-05-23 RX ADMIN — LOSARTAN POTASSIUM 100 MILLIGRAM(S): 100 TABLET, FILM COATED ORAL at 05:41

## 2024-05-23 RX ADMIN — Medication 25 MILLIGRAM(S): at 05:49

## 2024-05-23 RX ADMIN — SODIUM CHLORIDE 100 MILLILITER(S): 9 INJECTION, SOLUTION INTRAVENOUS at 19:30

## 2024-05-23 RX ADMIN — Medication 1 ENEMA: at 23:08

## 2024-05-23 RX ADMIN — Medication 1 ENEMA: at 23:19

## 2024-05-23 RX ADMIN — Medication 400 MILLIGRAM(S): at 11:32

## 2024-05-23 RX ADMIN — HEPARIN SODIUM 5000 UNIT(S): 5000 INJECTION INTRAVENOUS; SUBCUTANEOUS at 11:32

## 2024-05-23 RX ADMIN — FINASTERIDE 5 MILLIGRAM(S): 5 TABLET, FILM COATED ORAL at 11:33

## 2024-05-23 RX ADMIN — HEPARIN SODIUM 5000 UNIT(S): 5000 INJECTION INTRAVENOUS; SUBCUTANEOUS at 05:41

## 2024-05-23 NOTE — DIETITIAN INITIAL EVALUATION ADULT - NS FNS DIET ORDER
Diet, Low Fiber (05-23-24 @ 10:20)  Diet, Low Fiber:   Ensure Surgery Cans or Servings Per Day:  1     Special Instructions for Nursing:  Advance diet to low fiber with 1 ensure surgery if patient tolerates 1 clear liquid tray (05-22-24 @ 13:54)

## 2024-05-23 NOTE — DIETITIAN INITIAL EVALUATION ADULT - ENERGY INTAKE
Adequate (%) In-house pt reports fair PO intake, reports dislike of clear liquids but tolerating without nausea. Reports some abdominal pain. Reports "watery bowel movement.

## 2024-05-23 NOTE — CONSULT NOTE ADULT - SUBJECTIVE AND OBJECTIVE BOX
Chief Complaint:      HPI:    84 year old male with HTN, HLD, heart murmur, CAD s/p PCI (25 years ago) and CABG x 3 (20 years ago - on plavix), GERD, BPH, hospitalized 8/2023 for babesiosis infection which was treated, colon polyps, most recent colonoscopy on 5/7/24 with 30 cm adenocarcinoma, s/p lap R hemicolectomy 5/22. GI is consulted for post-operative hematochezia.     Allergies:  No Known Drug Allergies  CT Dye (Vomiting; Diarrhea; Flushing)      Hospital Medications:  acetaminophen     Tablet .. 1000 milliGRAM(s) Oral every 6 hours  atorvastatin 20 milliGRAM(s) Oral at bedtime  dextrose 10% Bolus 125 milliLiter(s) IV Bolus once  dextrose 5%. 1000 milliLiter(s) IV Continuous <Continuous>  dextrose 5%. 1000 milliLiter(s) IV Continuous <Continuous>  dextrose 50% Injectable 25 Gram(s) IV Push once  dextrose 50% Injectable 12.5 Gram(s) IV Push once  dextrose Oral Gel 15 Gram(s) Oral once PRN  diphenhydrAMINE Injectable 50 milliGRAM(s) IV Push once  finasteride 5 milliGRAM(s) Oral daily  glucagon  Injectable 1 milliGRAM(s) IntraMuscular once  hydrocortisone sodium succinate Injectable 200 milliGRAM(s) IV Push once  hydrocortisone sodium succinate Injectable 200 milliGRAM(s) IV Push every 6 hours  insulin lispro (ADMELOG) corrective regimen sliding scale   SubCutaneous three times a day before meals  lactated ringers. 1000 milliLiter(s) IV Continuous <Continuous>  losartan 100 milliGRAM(s) Oral daily  metoprolol succinate ER 25 milliGRAM(s) Oral daily  oxyCODONE    IR 5 milliGRAM(s) Oral every 4 hours PRN  oxyCODONE    IR 2.5 milliGRAM(s) Oral every 4 hours PRN  pantoprazole  Injectable 40 milliGRAM(s) IV Push daily  tamsulosin 0.4 milliGRAM(s) Oral at bedtime      PMHX/PSHX:  3-vessel CAD    HTN (hypertension)    HLD (hyperlipidemia)    Diabetes mellitus, type 2    BPH (benign prostatic hyperplasia)    GERD (gastroesophageal reflux disease)    Ulcerative colitis    Stented coronary artery    History of babesiosis    Benign neoplasm of colon, unspecified    Anemia    Hard of hearing    Heart murmur    S/P CABG x 3    Coronary stent patent    Stented coronary artery    S/P CABG x 1    S/P coronary artery stent placement    History of colonoscopy        Family history:  Family history of CVA (Father, Mother)    FH: type 2 diabetes mellitus (Father, Mother)    Family hx of hypertension (Father, Mother)    Family history of early CAD (Sibling)        Social History:   No alcohol or smoking    ROS:   See HPI    PHYSICAL EXAM:   GENERAL:  NAD, resting comfortably in bed  HEENT:  Sclera anicteric  RESPIRATORY:  Normal effort  CARDIAC:  HDS  ABDOMEN:  Soft, non-tender, non-distended  EXTREMITIES:  No edema  SKIN:  Warm & Dry. No jaundice.   NEURO:  Alert, conversant, no focal deficit    Vital Signs:  Vital Signs Last 24 Hrs  T(C): 36.5 (23 May 2024 13:57), Max: 36.8 (23 May 2024 00:25)  T(F): 97.7 (23 May 2024 13:57), Max: 98.2 (23 May 2024 00:25)  HR: 65 (23 May 2024 13:57) (60 - 79)  BP: 127/62 (23 May 2024 13:57) (105/54 - 146/80)  BP(mean): --  RR: 18 (23 May 2024 13:57) (16 - 18)  SpO2: 96% (23 May 2024 13:57) (95% - 100%)    Parameters below as of 23 May 2024 13:57  Patient On (Oxygen Delivery Method): room air      Daily     Daily     LABS:                        8.0    8.46  )-----------( 216      ( 23 May 2024 14:44 )             27.9     Mean Cell Volume: 72.7 fl (05-23-24 @ 14:44)    05-23    138  |  105  |  24<H>  ----------------------------<  200<H>  4.6   |  22  |  1.45<H>    Ca    8.1<L>      23 May 2024 07:11  Phos  4.3     05-23  Mg     2.3     05-23          Urinalysis Basic - ( 23 May 2024 07:11 )    Color: x / Appearance: x / SG: x / pH: x  Gluc: 200 mg/dL / Ketone: x  / Bili: x / Urobili: x   Blood: x / Protein: x / Nitrite: x   Leuk Esterase: x / RBC: x / WBC x   Sq Epi: x / Non Sq Epi: x / Bacteria: x                              8.0    8.46  )-----------( 216      ( 23 May 2024 14:44 )             27.9                         7.7    7.90  )-----------( 189      ( 23 May 2024 12:33 )             26.2                         7.5    6.24  )-----------( 213      ( 23 May 2024 07:09 )             25.6                         8.8    7.20  )-----------( 215      ( 22 May 2024 14:37 )             30.6     Imaging:    < from: Colonoscopy (05.07.24 @ 13:17) >  Findings:       The perianal and digital rectal examinations were normal.       A 30 mm polyp was found in the proximal ascending colon. There was a tattoo just distal to        the polyp. The polyp was Zohra classification mixed IIa + IIc (superficial elevation with        central depression). Area was injectedwith saline for lesion assessment and attempt at        resection but the lesion could not be lifted adequately. The centrally depressed area        appeared concerning for malignancy. Biopsies were taken with a cold forceps for histology.       A 25mm polyp was found in the ascending colon just distal to the previously placed tattoo.        The polyp was sessile.                                                                                                        Impression:          - One 30 mm polyp in the proximal ascending colon not resected given the                        concern for malignancy. Biopsied.                       - One 25 mm polyp in the ascending colon distal to larger polyp not resected.    < end of copied text >     Chief Complaint:  colon cancer    HPI:    84 year old male with HTN, HLD, heart murmur, CAD s/p PCI (25 years ago) and CABG x 3 (20 years ago - on plavix), GERD, BPH, hospitalized 8/2023 for babesiosis infection which was treated, colon polyps, most recent colonoscopy on 5/7/24 with 30 cm adenocarcinoma, s/p lap R hemicolectomy 5/22. GI is consulted for post-operative hematochezia.     Allergies:  No Known Drug Allergies  CT Dye (Vomiting; Diarrhea; Flushing)      Hospital Medications:  acetaminophen     Tablet .. 1000 milliGRAM(s) Oral every 6 hours  atorvastatin 20 milliGRAM(s) Oral at bedtime  dextrose 10% Bolus 125 milliLiter(s) IV Bolus once  dextrose 5%. 1000 milliLiter(s) IV Continuous <Continuous>  dextrose 5%. 1000 milliLiter(s) IV Continuous <Continuous>  dextrose 50% Injectable 25 Gram(s) IV Push once  dextrose 50% Injectable 12.5 Gram(s) IV Push once  dextrose Oral Gel 15 Gram(s) Oral once PRN  diphenhydrAMINE Injectable 50 milliGRAM(s) IV Push once  finasteride 5 milliGRAM(s) Oral daily  glucagon  Injectable 1 milliGRAM(s) IntraMuscular once  hydrocortisone sodium succinate Injectable 200 milliGRAM(s) IV Push once  hydrocortisone sodium succinate Injectable 200 milliGRAM(s) IV Push every 6 hours  insulin lispro (ADMELOG) corrective regimen sliding scale   SubCutaneous three times a day before meals  lactated ringers. 1000 milliLiter(s) IV Continuous <Continuous>  losartan 100 milliGRAM(s) Oral daily  metoprolol succinate ER 25 milliGRAM(s) Oral daily  oxyCODONE    IR 5 milliGRAM(s) Oral every 4 hours PRN  oxyCODONE    IR 2.5 milliGRAM(s) Oral every 4 hours PRN  pantoprazole  Injectable 40 milliGRAM(s) IV Push daily  tamsulosin 0.4 milliGRAM(s) Oral at bedtime      PMHX/PSHX:  3-vessel CAD    HTN (hypertension)    HLD (hyperlipidemia)    Diabetes mellitus, type 2    BPH (benign prostatic hyperplasia)    GERD (gastroesophageal reflux disease)    Ulcerative colitis    Stented coronary artery    History of babesiosis    Benign neoplasm of colon, unspecified    Anemia    Hard of hearing    Heart murmur    S/P CABG x 3    Coronary stent patent    Stented coronary artery    S/P CABG x 1    S/P coronary artery stent placement    History of colonoscopy      Family history:  Family history of CVA (Father, Mother)    FH: type 2 diabetes mellitus (Father, Mother)    Family hx of hypertension (Father, Mother)    Family history of early CAD (Sibling)      Social History:   No alcohol or smoking    ROS:   See HPI    PHYSICAL EXAM:   GENERAL:  NAD, resting comfortably in bed  HEENT:  Sclera anicteric  RESPIRATORY:  Normal effort  CARDIAC:  HDS  ABDOMEN:  Soft, non-tender, non-distended  EXTREMITIES:  No edema  SKIN:  Warm & Dry. No jaundice.   NEURO:  Alert, conversant, no focal deficit    Vital Signs:  Vital Signs Last 24 Hrs  T(C): 36.5 (23 May 2024 13:57), Max: 36.8 (23 May 2024 00:25)  T(F): 97.7 (23 May 2024 13:57), Max: 98.2 (23 May 2024 00:25)  HR: 65 (23 May 2024 13:57) (60 - 79)  BP: 127/62 (23 May 2024 13:57) (105/54 - 146/80)  BP(mean): --  RR: 18 (23 May 2024 13:57) (16 - 18)  SpO2: 96% (23 May 2024 13:57) (95% - 100%)    Parameters below as of 23 May 2024 13:57  Patient On (Oxygen Delivery Method): room air    Daily     LABS:                        8.0    8.46  )-----------( 216      ( 23 May 2024 14:44 )             27.9     Mean Cell Volume: 72.7 fl (05-23-24 @ 14:44)    05-23    138  |  105  |  24<H>  ----------------------------<  200<H>  4.6   |  22  |  1.45<H>    Ca    8.1<L>      23 May 2024 07:11  Phos  4.3     05-23  Mg     2.3     05-23          Urinalysis Basic - ( 23 May 2024 07:11 )    Color: x / Appearance: x / SG: x / pH: x  Gluc: 200 mg/dL / Ketone: x  / Bili: x / Urobili: x   Blood: x / Protein: x / Nitrite: x   Leuk Esterase: x / RBC: x / WBC x   Sq Epi: x / Non Sq Epi: x / Bacteria: x                          8.0    8.46  )-----------( 216      ( 23 May 2024 14:44 )             27.9                         7.7    7.90  )-----------( 189      ( 23 May 2024 12:33 )             26.2                         7.5    6.24  )-----------( 213      ( 23 May 2024 07:09 )             25.6                         8.8    7.20  )-----------( 215      ( 22 May 2024 14:37 )             30.6     Imaging:    < from: Colonoscopy (05.07.24 @ 13:17) >  Findings:       The perianal and digital rectal examinations were normal.       A 30 mm polyp was found in the proximal ascending colon. There was a tattoo just distal to        the polyp. The polyp was Zohra classification mixed IIa + IIc (superficial elevation with        central depression). Area was injectedwith saline for lesion assessment and attempt at        resection but the lesion could not be lifted adequately. The centrally depressed area        appeared concerning for malignancy. Biopsies were taken with a cold forceps for histology.       A 25mm polyp was found in the ascending colon just distal to the previously placed tattoo.        The polyp was sessile.                                                                                                        Impression:          - One 30 mm polyp in the proximal ascending colon not resected given the                        concern for malignancy. Biopsied.                       - One 25 mm polyp in the ascending colon distal to larger polyp not resected.    < end of copied text >

## 2024-05-23 NOTE — DIETITIAN INITIAL EVALUATION ADULT - OTHER INFO
Weight: reports UBW ~ 150lbs. Current dosing weight is 154lbs. Per Chemo GANT, weight appears relatively stable between 150-160lbs.

## 2024-05-23 NOTE — DIETITIAN INITIAL EVALUATION ADULT - PERTINENT MEDS FT
MEDICATIONS  (STANDING):  acetaminophen   IVPB .. 1000 milliGRAM(s) IV Intermittent every 6 hours  atorvastatin 20 milliGRAM(s) Oral at bedtime  dextrose 10% Bolus 125 milliLiter(s) IV Bolus once  dextrose 5%. 1000 milliLiter(s) (100 mL/Hr) IV Continuous <Continuous>  dextrose 5%. 1000 milliLiter(s) (50 mL/Hr) IV Continuous <Continuous>  dextrose 50% Injectable 12.5 Gram(s) IV Push once  dextrose 50% Injectable 25 Gram(s) IV Push once  finasteride 5 milliGRAM(s) Oral daily  glucagon  Injectable 1 milliGRAM(s) IntraMuscular once  heparin   Injectable 5000 Unit(s) SubCutaneous every 8 hours  insulin lispro (ADMELOG) corrective regimen sliding scale   SubCutaneous three times a day before meals  losartan 100 milliGRAM(s) Oral daily  magnesium oxide 1000 milliGRAM(s) Oral two times a day with meals  metoprolol succinate ER 25 milliGRAM(s) Oral daily  pantoprazole  Injectable 40 milliGRAM(s) IV Push daily  tamsulosin 0.4 milliGRAM(s) Oral at bedtime    MEDICATIONS  (PRN):  dextrose Oral Gel 15 Gram(s) Oral once PRN Blood Glucose LESS THAN 70 milliGRAM(s)/deciliter  oxyCODONE    IR 5 milliGRAM(s) Oral every 4 hours PRN Severe Pain (7 - 10)  oxyCODONE    IR 2.5 milliGRAM(s) Oral every 4 hours PRN Moderate Pain (4 - 6)

## 2024-05-23 NOTE — CONSULT NOTE ADULT - ASSESSMENT
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     5/22: Lap R hemicolectomy    PLAN  NEURO  - Pain: Tylenol, Oxy  - Sedation:     RESP  - Maintain O2 saturation >92%  - AM CXRs    CV  - MAP goal >65  - Pressors: None  - Home Rx: Atorvastatin    GI/NUTRITION  - Diet: NPO  - Protonix IV daily  - GI recs: plan to scope 5/23    RENAL/  - Saenz  - Home Rx: Finasteride, Tamsulosin  - LR@100    HEME  - DVT ppx: None    ID  - ABX: None    ENDO  - Monitor blood glucose     LINES  - PIVs    CODE: FULL  DISPO: SICU

## 2024-05-23 NOTE — CONSULT NOTE ADULT - ASSESSMENT
84 year old male with HTN, HLD, heart murmur, CAD s/p PCI (25 years ago) and CABG x 3 (20 years ago - on plavix), GERD, BPH, hospitalized 8/2023 for babesiosis infection which was treated, colon polyps, most recent colonoscopy on 5/7/24 with adenocarcinoma at the ascending colon s/p R hemicolectomy 5/22. GI is consulted for post-operative hematochezia.     #Hematochezia  #Colonic adenocarcinoma s/p R hemicolectomy, POD#1  Patient with multiple episodes of hematochezia, vitals remain stable, hgb stable at 8.0. Suspect lower GI bleeding, likely anastomotic bleed versus other etiology    Recommendations:  - F/u CTA  - Closely monitor vital signs and for clinical signs of bleeding  - Track all stool output and color  - Maintain two large bore peripheral IVs  - Trend CBC, maintain active T&S and transfuse to goal hgb > 7 g/dL   - Keep NPO  - Surgical vs endoscopic intervention pending CT findings      Jacob Ham MD  Gastroenterology/Hepatology Fellow  Available via [employer] Teams  Pager: [phone]    On Weekdays after 5 PM to 8AM and Weekends/Holidays (All day)  For non-urgent consults, please email [email or web address]  For urgent consults, please contact on call GI team.  See Amion schedule ([facility]), [employer] system - [phone] ([facility]), or call hospital  ([facility]) 84 year old male with HTN, HLD, heart murmur, CAD s/p PCI (25 years ago) and CABG x 3 (20 years ago - on plavix), GERD, BPH, hospitalized 8/2023 for babesiosis infection which was treated, colon polyps, most recent colonoscopy on 5/7/24 with adenocarcinoma at the ascending colon s/p R hemicolectomy 5/22. GI is consulted for post-operative hematochezia.     #Hematochezia  #Colonic adenocarcinoma s/p R hemicolectomy, POD#1  Patient with multiple episodes of hematochezia, vitals remain stable, hgb stable at 8.0. Suspect lower GI bleeding, likely anastomotic bleed versus other etiology. Less likely brisk upper GI bleeding given hemodynamic stability.       Recommendations:  - F/u CTA  - Closely monitor vital signs and for clinical signs of bleeding  - Track all stool output and color  - Maintain two large bore peripheral IVs  - Trend CBC, maintain active T&S and transfuse to goal hgb > 7 g/dL   - Keep NPO  - Empiric PPI   - Surgical vs endoscopic intervention pending CT findings      Jacob Ham MD  Gastroenterology/Hepatology Fellow  Available via [employer] Teams  Pager: [phone]    On Weekdays after 5 PM to 8AM and Weekends/Holidays (All day)  For non-urgent consults, please email [email or web address] or [email or web address]  For urgent consults, please contact on call GI team.  See Amion schedule ([facility]), [employer] system - [phone] ([facility]), or call hospital  ([facility])

## 2024-05-23 NOTE — DIETITIAN INITIAL EVALUATION ADULT - ORAL INTAKE PTA/DIET HISTORY
Pt reports good PO intake and appetite, consumes regular diet but limits intake of concentrated sweets.  NKFA. Pt denies chewing/swallowing difficulty, nausea, vomiting, diarrhea, constipation.

## 2024-05-23 NOTE — PROGRESS NOTE ADULT - ASSESSMENT
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    Now s/p Lap R hemicolectomy. Tolerated procedure well.     Plan    - ERP Protocol  - Juarez removed POD #1 unless contraindicated then POD #2 ; TOV at after juarez removal at 6 hrs   - Diet: CLD > possible advance today if having bowel function   - Monitor bowel function   - OOB as tolerated  - Incentive spirometry  - DVT prophylaxis: SubQ Heparin  - PT no needs     Green Team   [id number].

## 2024-05-23 NOTE — CONSULT NOTE ADULT - SUBJECTIVE AND OBJECTIVE BOX
HISTORY OF PRESENT ILLNESS:  Patient is a 84y Male     PAST MEDICAL HISTORY:   3-vessel CAD  HTN (hypertension)  HLD (hyperlipidemia)  Diabetes mellitus, type 2  BPH (benign prostatic hyperplasia)  GERD (gastroesophageal reflux disease)  Ulcerative colitis  Stented coronary artery  History of babesiosis  Benign neoplasm of colon, unspecified  Anemia  Hard of hearing  Heart murmur        PAST SURGICAL HISTORY: S/P CABG x 3  Stented coronary artery  S/P CABG x 1  S/P coronary artery stent placement    History of colonoscopy        FAMILY HISTORY: Family history of CVA (Father, Mother)    FH: type 2 diabetes mellitus (Father, Mother)    Family hx of hypertension (Father, Mother)    Family history of early CAD (Sibling)        SOCIAL HISTORY:    CODE STATUS:     HOME MEDICATIONS:    ALLERGIES: No Known Drug Allergies  CT Dye (Vomiting; Diarrhea; Flushing)      VITAL SIGNS:    T(C): 36.7 (23 May 2024 16:26), Max: 36.8 (23 May 2024 00:25)  T(F): 98 (23 May 2024 16:26), Max: 98.2 (23 May 2024 00:25)  HR: 78 (23 May 2024 16:26) (60 - 79)  BP: 132/72 (23 May 2024 16:26) (105/54 - 146/80)  RR: 18 (23 May 2024 16:26) (18 - 18)  SpO2: 96% (23 May 2024 16:26) (95% - 100%)    O2 Parameters below as of 23 May 2024 16:26  Patient On (Oxygen Delivery Method): room air        NEURO  Exam:  acetaminophen     Tablet .. 1000 milliGRAM(s) Oral every 6 hours  oxyCODONE    IR 2.5 milliGRAM(s) Oral every 4 hours PRN Moderate Pain (4 - 6)  oxyCODONE    IR 5 milliGRAM(s) Oral every 4 hours PRN Severe Pain (7 - 10)      RESPIRATORY  Mechanical Ventilation:   ABG - ( 22 May 2024 11:22 )  pH: 7.45  /  pCO2: 36    /  pO2: 441   / HCO3: 25    / Base Excess: 1.1   /  SaO2: 98.1    Lactate: x                Exam:      CARDIOVASCULAR    Exam:  Cardiac Rhythm:  losartan 100 milliGRAM(s) Oral daily  metoprolol succinate ER 25 milliGRAM(s) Oral daily      GI/NUTRITION  Exam:  Diet:  pantoprazole  Injectable 40 milliGRAM(s) IV Push daily      GENITOURINARY/RENAL  dextrose 10% Bolus 125 milliLiter(s) IV Bolus once  dextrose 5%. 1000 milliLiter(s) IV Continuous <Continuous>  dextrose 5%. 1000 milliLiter(s) IV Continuous <Continuous>  lactated ringers. 1000 milliLiter(s) IV Continuous <Continuous>  tamsulosin 0.4 milliGRAM(s) Oral at bedtime      05-22 @ 07:01  -  05-23 @ 07:00  --------------------------------------------------------  IN:    IV PiggyBack: 100 mL    Lactated Ringers: 375 mL  Total IN: 475 mL    OUT:    Indwelling Catheter - Urethral (mL): 715 mL  Total OUT: 715 mL    Total NET: -240 mL      05-23 @ 07:01  -  05-23 @ 17:17  --------------------------------------------------------  IN:    IV PiggyBack: 100 mL    PRBCs (Packed Red Blood Cells): 300 mL  Total IN: 400 mL    OUT:    Indwelling Catheter - Urethral (mL): 800 mL  Total OUT: 800 mL    Total NET: -400 mL          05-23    138  |  105  |  24<H>  ----------------------------<  200<H>  4.6   |  22  |  1.45<H>    Ca    8.1<L>      23 May 2024 07:11  Phos  4.3     05-23  Mg     2.3     05-23      [ ] Saenz catheter, indication: urine output monitoring in critically ill patient    HEMATOLOGIC  [ ] VTE Prophylaxis:                          8.0    8.46  )-----------( 216      ( 23 May 2024 14:44 )             27.9       Transfusion: [ ] PRBC	[ ] Platelets	[ ] FFP	[ ] Cryoprecipitate      INFECTIOUS DISEASES    RECENT CULTURES:      ENDOCRINE  atorvastatin 20 milliGRAM(s) Oral at bedtime  dextrose 50% Injectable 25 Gram(s) IV Push once  dextrose 50% Injectable 12.5 Gram(s) IV Push once  dextrose Oral Gel 15 Gram(s) Oral once PRN  finasteride 5 milliGRAM(s) Oral daily  glucagon  Injectable 1 milliGRAM(s) IntraMuscular once  hydrocortisone sodium succinate Injectable 200 milliGRAM(s) IV Push every 6 hours  insulin lispro (ADMELOG) corrective regimen sliding scale   SubCutaneous three times a day before meals    CAPILLARY BLOOD GLUCOSE      POCT Blood Glucose.: 221 mg/dL (23 May 2024 12:51)  POCT Blood Glucose.: 223 mg/dL (23 May 2024 08:27)      PATIENT CARE ACCESS DEVICES:  [ ] Peripheral IV  [ ] Central Venous Line	[ ] R	[ ] L	[ ] IJ	[ ] Fem	[ ] SC	Placed:   [ ] Arterial Line		[ ] R	[ ] L	[ ] Fem	[ ] Rad	[ ] Ax	Placed:   [ ] PICC:					[ ] Mediport  [ ] Urinary Catheter, Date Placed:   [x] Necessity of urinary, arterial, and venous catheters discussed    OTHER MEDICATIONS:     IMAGING STUDIES: SICU Consultation Note  =====================================================  HPI: 84M PMH HTN, HLD, heart murmur, CAD s/p PCI intervention x 1 (25 years ago) and CABG x 3 (20 years ago - on plavix), GERD, BPH, 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 S/P lap R hemicolectomy 5/22, c/b bleed at anastomosis.     5/22: Lap R hemicolectomy       PAST MEDICAL & SURGICAL HISTORY:  3-vessel CAD  HTN (hypertension)  HLD (hyperlipidemia)  Diabetes mellitus, type 2  BPH (benign prostatic hyperplasia)  GERD (gastroesophageal reflux disease)  Ulcerative colitis  Stented coronary artery  History of babesiosis  Benign neoplasm of colon, unspecified  Anemia  Hard of hearing  Heart murmur  S/P CABG x 3  S/P coronary artery stent placement  History of colonoscopy    Home Meds: Home Medications:  Bacid oral capsule: 1 cap(s) orally once a day (22 May 2024 08:57)  Calcium w/ Vit D3: 1 tab daily (22 May 2024 08:57)  clopidogrel 75 mg oral tablet: 1 tab(s) orally once a day (22 May 2024 08:57)  ferrous sulfate (as elemental iron) 45 mg oral tablet, extended release: 1 tab(s) orally once a day (22 May 2024 08:57)  finasteride 5 mg oral tablet: 1 tab(s) orally once a day (22 May 2024 08:57)  Fish Oil: 1 tablet by mouth daily (22 May 2024 08:57)  glipiZIDE 5 mg oral tablet: 1 tab(s) orally 2 times a day (22 May 2024 08:57)  losartan 100 mg oral tablet: 1 tab(s) orally once a day (22 May 2024 08:57)  metFORMIN 1000 mg oral tablet: 1 tab(s) orally 2 times a day (22 May 2024 08:57)  metoprolol succinate 25 mg oral tablet, extended release: 1 tab(s) orally once a day (at bedtime) (22 May 2024 08:57)  Multiple Vitamins oral capsule: 1 cap(s) orally once a day (22 May 2024 08:57)  Pepcid AC 10 mg oral tablet: 1 tab(s) orally once a day (22 May 2024 08:57)  rosuvastatin 5 mg oral tablet: 1 tab(s) orally once a day (at bedtime) (22 May 2024 08:57)  tamsulosin 0.4 mg oral capsule: 1 cap(s) orally once a day (at bedtime) (22 May 2024 08:57)  Vitamin B12: 1 tablet  by mouth daily (22 May 2024 08:57)    Allergies: Allergies    No Known Drug Allergies  CT Dye (Vomiting; Diarrhea; Flushing)    Intolerances      Soc:   Advanced Directives: Presumed Full Code     ROS:    REVIEW OF SYSTEMS    [ ] A ten-point review of systems was otherwise negative except as noted.  [ ] Due to altered mental status/intubation, subjective information were not able to be obtained from the patient. History was obtained, to the extent possible, from review of the chart and collateral sources of information.      CURRENT MEDICATIONS:   --------------------------------------------------------------------------------------  Neurologic Medications  acetaminophen     Tablet .. 1000 milliGRAM(s) Oral every 6 hours  oxyCODONE    IR 2.5 milliGRAM(s) Oral every 4 hours PRN Moderate Pain (4 - 6)  oxyCODONE    IR 5 milliGRAM(s) Oral every 4 hours PRN Severe Pain (7 - 10)    Respiratory Medications    Cardiovascular Medications    Gastrointestinal Medications  lactated ringers. 1000 milliLiter(s) IV Continuous <Continuous>  pantoprazole  Injectable 40 milliGRAM(s) IV Push daily    Genitourinary Medications  tamsulosin 0.4 milliGRAM(s) Oral at bedtime    Hematologic/Oncologic Medications    Antimicrobial/Immunologic Medications    Endocrine/Metabolic Medications  atorvastatin 20 milliGRAM(s) Oral at bedtime  finasteride 5 milliGRAM(s) Oral daily  insulin lispro (ADMELOG) corrective regimen sliding scale   SubCutaneous three times a day before meals    Topical/Other Medications  chlorhexidine 2% Cloths 1 Application(s) Topical <User Schedule>    --------------------------------------------------------------------------------------    VITAL SIGNS, INS/OUTS (last 24 hours):  --------------------------------------------------------------------------------------  ICU Vital Signs Last 24 Hrs  T(C): 36.7 (23 May 2024 19:00), Max: 36.8 (23 May 2024 00:25)  T(F): 98.1 (23 May 2024 19:00), Max: 98.2 (23 May 2024 00:25)  HR: 77 (23 May 2024 19:00) (60 - 82)  BP: 178/82 (23 May 2024 19:00) (105/54 - 178/82)  BP(mean): 118 (23 May 2024 19:00) (118 - 118)  ABP: --  ABP(mean): --  RR: 17 (23 May 2024 19:00) (17 - 18)  SpO2: 94% (23 May 2024 19:00) (94% - 100%)    O2 Parameters below as of 23 May 2024 19:00  Patient On (Oxygen Delivery Method): room air          I&O's Summary    22 May 2024 07:01  -  23 May 2024 07:00  --------------------------------------------------------  IN: 475 mL / OUT: 715 mL / NET: -240 mL    23 May 2024 07:01  -  23 May 2024 19:46  --------------------------------------------------------  IN: 625 mL / OUT: 1670 mL / NET: -1045 mL      --------------------------------------------------------------------------------------    EXAM:  GENERAL: NAD, lying in bed   NEURO: AOx3, awake alert appropriate  HEENT: NCAT, trachea midline  PULM: Respirations non-labored  ABD: Soft, non-tender, non-distended, no peritonitis/rebound tenderness,   EXT: Warm, well perfused   LINES:    LABS  --------------------------------------------------------------------------------------                        8.5    7.91  )-----------( 197      ( 23 May 2024 18:36 )             27.8   05-23    138  |  105  |  24<H>  ----------------------------<  200<H>  4.6   |  22  |  1.45<H>    Ca    8.1<L>      23 May 2024 07:11  Phos  4.3     05-23  Mg     2.3     05-23    ABG - ( 22 May 2024 11:22 )  pH, Arterial: 7.45  pH, Blood: x     /  pCO2: 36    /  pO2: 441   / HCO3: 25    / Base Excess: 1.1   /  SaO2: 98.1            Urinalysis Basic - ( 23 May 2024 07:11 )    Color: x / Appearance: x / SG: x / pH: x  Gluc: 200 mg/dL / Ketone: x  / Bili: x / Urobili: x   Blood: x / Protein: x / Nitrite: x   Leuk Esterase: x / RBC: x / WBC x   Sq Epi: x / Non Sq Epi: x / Bacteria: x    PT/INR - ( 23 May 2024 19:31 )   PT: 11.3 sec;   INR: 1.03 ratio         PTT - ( 23 May 2024 19:31 )  PTT:28.7 sec  --------------------------------------------------------------------------------------

## 2024-05-23 NOTE — DIETITIAN INITIAL EVALUATION ADULT - REASON FOR ADMISSION
Malignant neoplasm of ascending colon    Chart reviewed, events noted. This is a 84 year old male with PMH HTN, HLD, heart murmur, CAD s/p PCI intervention x 1 (25 years ago) and CABG x 3 (20 years ago - on plavix), GERD, BPH, hospitalized 8/2023 for positive babesiosis infection which was treated, colon polyps, path revealed "Adenocarcinoma, moderately differentiated. Now s/p  s/p Lap R hemicolectomy.

## 2024-05-23 NOTE — DIETITIAN INITIAL EVALUATION ADULT - NSFNSADHERENCEPTAFT_GEN_A_CORE
Pt with T2DM, checks BG every 2-3 days. HBA1c: 7.5% (4/30). Takes Glipizide, Metformin.  Partial Purse String (Intermediate) Text: Given the location of the defect and the characteristics of the surrounding skin an intermediate purse string closure was deemed most appropriate.  Undermining was performed circumfirentially around the surgical defect.  A purse string suture was then placed and tightened. Wound tension only allowed a partial closure of the circular defect.

## 2024-05-23 NOTE — PRE-ANESTHESIA EVALUATION ADULT - NSANTHPMHFT_GEN_ALL_CORE
Reviewed. A&Ox4, FROM of all 4 extremities.
Buck d/c'ed 18th, discussed with surgeon, ruled out Spinal Duramorph.

## 2024-05-23 NOTE — DIETITIAN INITIAL EVALUATION ADULT - PERTINENT LABORATORY DATA
05-23    138  |  105  |  24<H>  ----------------------------<  200<H>  4.6   |  22  |  1.45<H>    Ca    8.1<L>      23 May 2024 07:11  Phos  4.3     05-23  Mg     2.3     05-23    POCT Blood Glucose.: 223 mg/dL (05-23-24 @ 08:27)  A1C with Estimated Average Glucose Result: 7.5 % (04-30-24 @ 12:24)  A1C with Estimated Average Glucose Result: 7.3 % (12-13-23 @ 16:02)  A1C with Estimated Average Glucose Result: 6.8 % (08-11-23 @ 06:48)

## 2024-05-23 NOTE — CONSULT NOTE ADULT - ATTENDING COMMENTS
84yr hx of htn cad stents w rectal bleeding thought to be anastomotic bleed.     on RA  cont to monitor for bleeding send TEG   transfuse as needed  not on plavix for >7 days
Bleeding resolved  ct angio results pending  case discussed with surgery   further rec to follow

## 2024-05-23 NOTE — PROGRESS NOTE ADULT - SUBJECTIVE AND OBJECTIVE BOX
General Surgery Daily Resident Progress Note    OVERNIGHT: MAHAD.     SUBJECTIVE: Pt seen and examined at bedside. Pain well controlled. Tolerating CLD.     OBJECTIVE:  Vital Signs Last 24 Hrs  T(C): 36.6 (22 May 2024 20:42), Max: 36.8 (22 May 2024 08:21)  T(F): 97.9 (22 May 2024 20:42), Max: 98.2 (22 May 2024 08:21)  HR: 71 (22 May 2024 20:42) (48 - 73)  BP: 146/80 (22 May 2024 20:42) (117/87 - 149/73)  BP(mean): 98 (22 May 2024 13:45) (98 - 98)  RR: 18 (22 May 2024 20:42) (14 - 18)  SpO2: 99% (22 May 2024 20:42) (99% - 100%)    Parameters below as of 22 May 2024 20:42  Patient On (Oxygen Delivery Method): room air      PHYSICAL EXAM:  Gen: NAD, A&Ox3  Pulm: No respiratory distress, no subcostal retractions  CV: RRR, no JVD  Abd: Soft, NT, mild distension, incision with mild strikethrough   Extremities: Grossly symmetric   LABS:                        8.8    7.20  )-----------( 215      ( 22 May 2024 14:37 )             30.6     05-22    137  |  103  |  21  ----------------------------<  190<H>  4.2   |  21<L>  |  1.15    Ca    8.0<L>      22 May 2024 14:37  Phos  3.3     05-22  Mg     2.2     05-22        Urinalysis Basic - ( 22 May 2024 14:37 )    Color: x / Appearance: x / SG: x / pH: x  Gluc: 190 mg/dL / Ketone: x  / Bili: x / Urobili: x   Blood: x / Protein: x / Nitrite: x   Leuk Esterase: x / RBC: x / WBC x   Sq Epi: x / Non Sq Epi: x / Bacteria: x

## 2024-05-23 NOTE — PRE-ANESTHESIA EVALUATION ADULT - NSANTHOSAYNRD_GEN_A_CORE
No. DI screening performed.  STOP BANG Legend: 0-2 = LOW Risk; 3-4 = INTERMEDIATE Risk; 5-8 = HIGH Risk
No. DI screening performed.  STOP BANG Legend: 0-2 = LOW Risk; 3-4 = INTERMEDIATE Risk; 5-8 = HIGH Risk

## 2024-05-23 NOTE — DIETITIAN INITIAL EVALUATION ADULT - ADD RECOMMEND
1) Recommend change to carbohydrate consistent + Low fiber diet. 2) Recommend Glucerna 1x daily to supplement PO intake. 3) Diet education provided, reinforce as needed. 4) RD to remain available and follow-up as medically appropriate.

## 2024-05-24 LAB
ANION GAP SERPL CALC-SCNC: 11 MMOL/L — SIGNIFICANT CHANGE UP (ref 5–17)
APTT BLD: 35.5 SEC — SIGNIFICANT CHANGE UP (ref 24.5–35.6)
BASE EXCESS BLDV CALC-SCNC: -0.4 MMOL/L — SIGNIFICANT CHANGE UP (ref -2–3)
BASE EXCESS BLDV CALC-SCNC: 0 MMOL/L — SIGNIFICANT CHANGE UP (ref -2–3)
BASE EXCESS BLDV CALC-SCNC: 1.9 MMOL/L — SIGNIFICANT CHANGE UP (ref -2–3)
BASE EXCESS BLDV CALC-SCNC: 2.3 MMOL/L — SIGNIFICANT CHANGE UP (ref -2–3)
BUN SERPL-MCNC: 18 MG/DL — SIGNIFICANT CHANGE UP (ref 7–23)
CA-I SERPL-SCNC: 1.11 MMOL/L — LOW (ref 1.15–1.33)
CA-I SERPL-SCNC: 1.19 MMOL/L — SIGNIFICANT CHANGE UP (ref 1.15–1.33)
CA-I SERPL-SCNC: 1.2 MMOL/L — SIGNIFICANT CHANGE UP (ref 1.15–1.33)
CA-I SERPL-SCNC: 1.22 MMOL/L — SIGNIFICANT CHANGE UP (ref 1.15–1.33)
CALCIUM SERPL-MCNC: 8.3 MG/DL — LOW (ref 8.4–10.5)
CHLORIDE BLDV-SCNC: 107 MMOL/L — SIGNIFICANT CHANGE UP (ref 96–108)
CHLORIDE BLDV-SCNC: 108 MMOL/L — SIGNIFICANT CHANGE UP (ref 96–108)
CHLORIDE BLDV-SCNC: 109 MMOL/L — HIGH (ref 96–108)
CHLORIDE BLDV-SCNC: 111 MMOL/L — HIGH (ref 96–108)
CHLORIDE SERPL-SCNC: 108 MMOL/L — SIGNIFICANT CHANGE UP (ref 96–108)
CO2 BLDV-SCNC: 26 MMOL/L — SIGNIFICANT CHANGE UP (ref 22–26)
CO2 BLDV-SCNC: 27 MMOL/L — HIGH (ref 22–26)
CO2 BLDV-SCNC: 28 MMOL/L — HIGH (ref 22–26)
CO2 BLDV-SCNC: 29 MMOL/L — HIGH (ref 22–26)
CO2 SERPL-SCNC: 23 MMOL/L — SIGNIFICANT CHANGE UP (ref 22–31)
CREAT SERPL-MCNC: 1.13 MG/DL — SIGNIFICANT CHANGE UP (ref 0.5–1.3)
EGFR: 65 ML/MIN/1.73M2 — SIGNIFICANT CHANGE UP
GAS PNL BLDV: 139 MMOL/L — SIGNIFICANT CHANGE UP (ref 136–145)
GAS PNL BLDV: 140 MMOL/L — SIGNIFICANT CHANGE UP (ref 136–145)
GAS PNL BLDV: SIGNIFICANT CHANGE UP
GLUCOSE BLDC GLUCOMTR-MCNC: 148 MG/DL — HIGH (ref 70–99)
GLUCOSE BLDC GLUCOMTR-MCNC: 157 MG/DL — HIGH (ref 70–99)
GLUCOSE BLDC GLUCOMTR-MCNC: 178 MG/DL — HIGH (ref 70–99)
GLUCOSE BLDC GLUCOMTR-MCNC: 227 MG/DL — HIGH (ref 70–99)
GLUCOSE BLDV-MCNC: 127 MG/DL — HIGH (ref 70–99)
GLUCOSE BLDV-MCNC: 148 MG/DL — HIGH (ref 70–99)
GLUCOSE BLDV-MCNC: 153 MG/DL — HIGH (ref 70–99)
GLUCOSE BLDV-MCNC: 214 MG/DL — HIGH (ref 70–99)
GLUCOSE SERPL-MCNC: 220 MG/DL — HIGH (ref 70–99)
HCO3 BLDV-SCNC: 25 MMOL/L — SIGNIFICANT CHANGE UP (ref 22–29)
HCO3 BLDV-SCNC: 25 MMOL/L — SIGNIFICANT CHANGE UP (ref 22–29)
HCO3 BLDV-SCNC: 27 MMOL/L — SIGNIFICANT CHANGE UP (ref 22–29)
HCO3 BLDV-SCNC: 27 MMOL/L — SIGNIFICANT CHANGE UP (ref 22–29)
HCT VFR BLD CALC: 20.6 % — CRITICAL LOW (ref 39–50)
HCT VFR BLD CALC: 22.9 % — LOW (ref 39–50)
HCT VFR BLD CALC: 23.1 % — LOW (ref 39–50)
HCT VFR BLD CALC: 27 % — LOW (ref 39–50)
HCT VFR BLD CALC: 28.1 % — LOW (ref 39–50)
HCT VFR BLDA CALC: 21 % — CRITICAL LOW (ref 39–51)
HCT VFR BLDA CALC: 23 % — LOW (ref 39–51)
HCT VFR BLDA CALC: 27 % — LOW (ref 39–51)
HCT VFR BLDA CALC: 28 % — LOW (ref 39–51)
HEPARINASE TEG R TIME: 5.3 MIN — SIGNIFICANT CHANGE UP (ref 4.3–8.3)
HGB BLD CALC-MCNC: 6.9 G/DL — CRITICAL LOW (ref 12.6–17.4)
HGB BLD CALC-MCNC: 7.5 G/DL — LOW (ref 12.6–17.4)
HGB BLD CALC-MCNC: 8.9 G/DL — LOW (ref 12.6–17.4)
HGB BLD CALC-MCNC: 9.3 G/DL — LOW (ref 12.6–17.4)
HGB BLD-MCNC: 6.6 G/DL — CRITICAL LOW (ref 13–17)
HGB BLD-MCNC: 7.2 G/DL — LOW (ref 13–17)
HGB BLD-MCNC: 7.5 G/DL — LOW (ref 13–17)
HGB BLD-MCNC: 8.7 G/DL — LOW (ref 13–17)
HGB BLD-MCNC: 9.1 G/DL — LOW (ref 13–17)
HOROWITZ INDEX BLDV+IHG-RTO: 21 — SIGNIFICANT CHANGE UP
HOROWITZ INDEX BLDV+IHG-RTO: 21 — SIGNIFICANT CHANGE UP
INR BLD: 1.06 RATIO — SIGNIFICANT CHANGE UP (ref 0.85–1.18)
LACTATE BLDV-MCNC: 0.9 MMOL/L — SIGNIFICANT CHANGE UP (ref 0.5–2)
LACTATE BLDV-MCNC: 1 MMOL/L — SIGNIFICANT CHANGE UP (ref 0.5–2)
LACTATE BLDV-MCNC: 1.2 MMOL/L — SIGNIFICANT CHANGE UP (ref 0.5–2)
LACTATE BLDV-MCNC: 2 MMOL/L — SIGNIFICANT CHANGE UP (ref 0.5–2)
MAGNESIUM SERPL-MCNC: 2.3 MG/DL — SIGNIFICANT CHANGE UP (ref 1.6–2.6)
MCHC RBC-ENTMCNC: 23.7 PG — LOW (ref 27–34)
MCHC RBC-ENTMCNC: 23.9 PG — LOW (ref 27–34)
MCHC RBC-ENTMCNC: 24.9 PG — LOW (ref 27–34)
MCHC RBC-ENTMCNC: 25.3 PG — LOW (ref 27–34)
MCHC RBC-ENTMCNC: 26.5 PG — LOW (ref 27–34)
MCHC RBC-ENTMCNC: 31 GM/DL — LOW (ref 32–36)
MCHC RBC-ENTMCNC: 31.4 GM/DL — LOW (ref 32–36)
MCHC RBC-ENTMCNC: 32 GM/DL — SIGNIFICANT CHANGE UP (ref 32–36)
MCHC RBC-ENTMCNC: 32.5 GM/DL — SIGNIFICANT CHANGE UP (ref 32–36)
MCHC RBC-ENTMCNC: 33.7 GM/DL — SIGNIFICANT CHANGE UP (ref 32–36)
MCV RBC AUTO: 75.3 FL — LOW (ref 80–100)
MCV RBC AUTO: 77.2 FL — LOW (ref 80–100)
MCV RBC AUTO: 77.7 FL — LOW (ref 80–100)
MCV RBC AUTO: 78 FL — LOW (ref 80–100)
MCV RBC AUTO: 78.7 FL — LOW (ref 80–100)
NRBC # BLD: 0 /100 WBCS — SIGNIFICANT CHANGE UP (ref 0–0)
PCO2 BLDV: 40 MMHG — LOW (ref 42–55)
PCO2 BLDV: 41 MMHG — LOW (ref 42–55)
PCO2 BLDV: 44 MMHG — SIGNIFICANT CHANGE UP (ref 42–55)
PCO2 BLDV: 46 MMHG — SIGNIFICANT CHANGE UP (ref 42–55)
PH BLDV: 7.35 — SIGNIFICANT CHANGE UP (ref 7.32–7.43)
PH BLDV: 7.4 — SIGNIFICANT CHANGE UP (ref 7.32–7.43)
PH BLDV: 7.4 — SIGNIFICANT CHANGE UP (ref 7.32–7.43)
PH BLDV: 7.42 — SIGNIFICANT CHANGE UP (ref 7.32–7.43)
PHOSPHATE SERPL-MCNC: 3.4 MG/DL — SIGNIFICANT CHANGE UP (ref 2.5–4.5)
PLATELET # BLD AUTO: 145 K/UL — LOW (ref 150–400)
PLATELET # BLD AUTO: 155 K/UL — SIGNIFICANT CHANGE UP (ref 150–400)
PLATELET # BLD AUTO: 163 K/UL — SIGNIFICANT CHANGE UP (ref 150–400)
PLATELET # BLD AUTO: 164 K/UL — SIGNIFICANT CHANGE UP (ref 150–400)
PLATELET # BLD AUTO: 183 K/UL — SIGNIFICANT CHANGE UP (ref 150–400)
PO2 BLDV: 20 MMHG — LOW (ref 25–45)
PO2 BLDV: 37 MMHG — SIGNIFICANT CHANGE UP (ref 25–45)
PO2 BLDV: 40 MMHG — SIGNIFICANT CHANGE UP (ref 25–45)
PO2 BLDV: 61 MMHG — HIGH (ref 25–45)
POTASSIUM BLDV-SCNC: 4 MMOL/L — SIGNIFICANT CHANGE UP (ref 3.5–5.1)
POTASSIUM BLDV-SCNC: 4 MMOL/L — SIGNIFICANT CHANGE UP (ref 3.5–5.1)
POTASSIUM BLDV-SCNC: 4.1 MMOL/L — SIGNIFICANT CHANGE UP (ref 3.5–5.1)
POTASSIUM BLDV-SCNC: 4.2 MMOL/L — SIGNIFICANT CHANGE UP (ref 3.5–5.1)
POTASSIUM SERPL-MCNC: 4.1 MMOL/L — SIGNIFICANT CHANGE UP (ref 3.5–5.3)
POTASSIUM SERPL-SCNC: 4.1 MMOL/L — SIGNIFICANT CHANGE UP (ref 3.5–5.3)
PROTHROM AB SERPL-ACNC: 11.6 SEC — SIGNIFICANT CHANGE UP (ref 9.5–13)
RAPIDTEG MAXIMUM AMPLITUDE: 59.7 MM — SIGNIFICANT CHANGE UP (ref 52–70)
RAPIDTEG MAXIMUM AMPLITUDE: 62.5 MM — SIGNIFICANT CHANGE UP (ref 52–70)
RAPIDTEG MAXIMUM AMPLITUDE: 63.2 MM — SIGNIFICANT CHANGE UP (ref 52–70)
RBC # BLD: 2.65 M/UL — LOW (ref 4.2–5.8)
RBC # BLD: 2.96 M/UL — LOW (ref 4.2–5.8)
RBC # BLD: 3.04 M/UL — LOW (ref 4.2–5.8)
RBC # BLD: 3.43 M/UL — LOW (ref 4.2–5.8)
RBC # BLD: 3.64 M/UL — LOW (ref 4.2–5.8)
RBC # FLD: 20.7 % — HIGH (ref 10.3–14.5)
RBC # FLD: 22.3 % — HIGH (ref 10.3–14.5)
RBC # FLD: 24 % — HIGH (ref 10.3–14.5)
RBC # FLD: 25.3 % — HIGH (ref 10.3–14.5)
RBC # FLD: 25.9 % — HIGH (ref 10.3–14.5)
SAO2 % BLDV: 41.2 % — LOW (ref 67–88)
SAO2 % BLDV: 74.7 % — SIGNIFICANT CHANGE UP (ref 67–88)
SAO2 % BLDV: 75.1 % — SIGNIFICANT CHANGE UP (ref 67–88)
SAO2 % BLDV: 94.8 % — HIGH (ref 67–88)
SODIUM SERPL-SCNC: 142 MMOL/L — SIGNIFICANT CHANGE UP (ref 135–145)
TEG FUNCTIONAL FIBRINOGEN: 18.2 MM — SIGNIFICANT CHANGE UP (ref 15–32)
TEG FUNCTIONAL FIBRINOGEN: 18.9 MM — SIGNIFICANT CHANGE UP (ref 15–32)
TEG FUNCTIONAL FIBRINOGEN: 19.5 MM — SIGNIFICANT CHANGE UP (ref 15–32)
TEG LY30 (LYSIS): 0 % — SIGNIFICANT CHANGE UP (ref 0–2.6)
TEG LY30 (LYSIS): 0.1 % — SIGNIFICANT CHANGE UP (ref 0–2.6)
TEG MAXIMUM AMPLITUDE: 63.3 MM — SIGNIFICANT CHANGE UP (ref 52–69)
TEG REACTION TIME: 13.7 MIN — HIGH (ref 4.6–9.1)
TEG REACTION TIME: 8.1 MIN — SIGNIFICANT CHANGE UP (ref 4.6–9.1)
TEG REACTION TIME: >17 MIN — HIGH (ref 4.6–9.1)
WBC # BLD: 5.55 K/UL — SIGNIFICANT CHANGE UP (ref 3.8–10.5)
WBC # BLD: 5.82 K/UL — SIGNIFICANT CHANGE UP (ref 3.8–10.5)
WBC # BLD: 6.08 K/UL — SIGNIFICANT CHANGE UP (ref 3.8–10.5)
WBC # BLD: 6.15 K/UL — SIGNIFICANT CHANGE UP (ref 3.8–10.5)
WBC # BLD: 7.01 K/UL — SIGNIFICANT CHANGE UP (ref 3.8–10.5)
WBC # FLD AUTO: 5.55 K/UL — SIGNIFICANT CHANGE UP (ref 3.8–10.5)
WBC # FLD AUTO: 5.82 K/UL — SIGNIFICANT CHANGE UP (ref 3.8–10.5)
WBC # FLD AUTO: 6.08 K/UL — SIGNIFICANT CHANGE UP (ref 3.8–10.5)
WBC # FLD AUTO: 6.15 K/UL — SIGNIFICANT CHANGE UP (ref 3.8–10.5)
WBC # FLD AUTO: 7.01 K/UL — SIGNIFICANT CHANGE UP (ref 3.8–10.5)

## 2024-05-24 PROCEDURE — 99233 SBSQ HOSP IP/OBS HIGH 50: CPT | Mod: GC

## 2024-05-24 PROCEDURE — 45382 COLONOSCOPY W/CONTROL BLEED: CPT | Mod: 59,GC

## 2024-05-24 RX ORDER — CALCIUM GLUCONATE 100 MG/ML
2 VIAL (ML) INTRAVENOUS ONCE
Refills: 0 | Status: COMPLETED | OUTPATIENT
Start: 2024-05-24 | End: 2024-05-24

## 2024-05-24 RX ORDER — INSULIN LISPRO 100/ML
VIAL (ML) SUBCUTANEOUS EVERY 6 HOURS
Refills: 0 | Status: DISCONTINUED | OUTPATIENT
Start: 2024-05-24 | End: 2024-05-25

## 2024-05-24 RX ADMIN — PANTOPRAZOLE SODIUM 40 MILLIGRAM(S): 20 TABLET, DELAYED RELEASE ORAL at 12:18

## 2024-05-24 RX ADMIN — TAMSULOSIN HYDROCHLORIDE 0.4 MILLIGRAM(S): 0.4 CAPSULE ORAL at 21:32

## 2024-05-24 RX ADMIN — Medication 2: at 01:16

## 2024-05-24 RX ADMIN — Medication 1000 MILLIGRAM(S): at 23:18

## 2024-05-24 RX ADMIN — SODIUM CHLORIDE 100 MILLILITER(S): 9 INJECTION, SOLUTION INTRAVENOUS at 21:32

## 2024-05-24 RX ADMIN — ATORVASTATIN CALCIUM 20 MILLIGRAM(S): 80 TABLET, FILM COATED ORAL at 21:33

## 2024-05-24 RX ADMIN — Medication 200 GRAM(S): at 03:28

## 2024-05-24 RX ADMIN — Medication 1: at 06:32

## 2024-05-24 RX ADMIN — FINASTERIDE 5 MILLIGRAM(S): 5 TABLET, FILM COATED ORAL at 12:18

## 2024-05-24 RX ADMIN — CHLORHEXIDINE GLUCONATE 1 APPLICATION(S): 213 SOLUTION TOPICAL at 06:32

## 2024-05-24 RX ADMIN — Medication 2: at 23:25

## 2024-05-24 RX ADMIN — Medication 1000 MILLIGRAM(S): at 12:18

## 2024-05-24 NOTE — PROGRESS NOTE ADULT - ASSESSMENT
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    Now s/p Lap R hemicolectomy. Tolerated procedure well. Course complicated by lower GI bleed, found to have bleeding at staple line on CTA. Transferred to SICU for hemodynamic monitoring.     Plan  - s/p GI scope at bedside > active bleeding at anastomosis > application Nextpowder, resolution of bleeding   - NPO/IVF  - Monitor H&H   - Appreciate excellent SICU care   - PT no needs     Green Team   [id number].

## 2024-05-24 NOTE — PROGRESS NOTE ADULT - ASSESSMENT
84 year old male with HTN, HLD, heart murmur, CAD s/p PCI (25 years ago) and CABG x 3 (20 years ago - on plavix), GERD, BPH, hospitalized 8/2023 for babesiosis infection which was treated, colon polyps, most recent colonoscopy on 5/7/24 with adenocarcinoma at the ascending colon s/p R hemicolectomy 5/22. GI is consulted for post-operative hematochezia.     #Hematochezia  #Colonic adenocarcinoma s/p R hemicolectomy, POD#1  Patient with multiple episodes of hematochezia psost procedure found to have active extrave at colonic resection margin on imaging now s/p Colonoscopy overnight w/ large clotted blood and bleed at anastomotic site. Clot obscured the bleed. Nexpowder was sprayed. Since procedure drop in Hb 8.7--> 7.2 but no further bleeding.      Recommendations:  - Trend Hb; monitor for bleeding  - Track all stool output and color  - Maintain two large bore peripheral IVs  - Trend CBC, maintain active T&S and transfuse to goal hgb > 7 g/dL   - Keep NPO    Note incomplete until finalized by attending signature/attestation.    Dilia Schmid  GI/Hepatology Fellow PGY5    NON-URGENT CONSULTS:  Please email [email or web address]  AT NIGHT AND ON WEEKENDS:  Available on [employer] Teams  [phone] (Long Range Pager)    For urgent consults, please contact on call GI team. See Am[facility] schedule ([facility]) or [employer] system ([facility])

## 2024-05-24 NOTE — PROGRESS NOTE ADULT - SUBJECTIVE AND OBJECTIVE BOX
General Surgery Daily Resident Progress Note    SUBJECTIVE: Pt seen and examined at bedside. Pain well controlled.     Interval events  - 1xpRBC, 1xPlasma  - GI consulted - scope bedside  - 2x fleet enemas      OBJECTIVE:  Vital Signs Last 24 Hrs  T(C): 36.6 (23 May 2024 23:00), Max: 36.8 (23 May 2024 17:47)  T(F): 97.8 (23 May 2024 23:00), Max: 98.2 (23 May 2024 17:47)  HR: 60 (24 May 2024 00:30) (60 - 82)  BP: 122/59 (24 May 2024 00:30) (108/60 - 179/80)  BP(mean): 85 (24 May 2024 00:30) (77 - 118)  RR: 15 (24 May 2024 00:30) (12 - 31)  SpO2: 100% (24 May 2024 00:30) (94% - 100%)    Parameters below as of 23 May 2024 23:00  Patient On (Oxygen Delivery Method): room air    PHYSICAL EXAM:  Gen: NAD, A&Ox3  Pulm: No respiratory distress, no subcostal retractions  CV: RRR, no JVD  Abd: Soft, NT, mild distension, incision with mild strikethrough   Extremities: Grossly symmetric     LABS:                        8.3    6.15  )-----------( 194      ( 23 May 2024 22:31 )             27.0     05-23    138  |  105  |  24<H>  ----------------------------<  200<H>  4.6   |  22  |  1.45<H>    Ca    8.1<L>      23 May 2024 07:11  Phos  4.3     05-23  Mg     2.3     05-23      PT/INR - ( 23 May 2024 19:31 )   PT: 11.3 sec;   INR: 1.03 ratio         PTT - ( 23 May 2024 19:31 )  PTT:28.7 sec  Urinalysis Basic - ( 23 May 2024 07:11 )    Color: x / Appearance: x / SG: x / pH: x  Gluc: 200 mg/dL / Ketone: x  / Bili: x / Urobili: x   Blood: x / Protein: x / Nitrite: x   Leuk Esterase: x / RBC: x / WBC x   Sq Epi: x / Non Sq Epi: x / Bacteria: x

## 2024-05-24 NOTE — PROGRESS NOTE ADULT - SUBJECTIVE AND OBJECTIVE BOX
SICU Daily Progress Note  =====================================================    Interval events  - 1xpRBC, 1xPlasma  - GI consulted - scope bedside  - 2x fleet enemas        Allergies: No Known Drug Allergies  CT Dye (Vomiting; Diarrhea; Flushing)      MEDICATIONS:   --------------------------------------------------------------------------------------  Neurologic Medications  acetaminophen     Tablet .. 1000 milliGRAM(s) Oral every 6 hours  oxyCODONE    IR 2.5 milliGRAM(s) Oral every 4 hours PRN Moderate Pain (4 - 6)  oxyCODONE    IR 5 milliGRAM(s) Oral every 4 hours PRN Severe Pain (7 - 10)    Respiratory Medications    Cardiovascular Medications    Gastrointestinal Medications  lactated ringers. 1000 milliLiter(s) IV Continuous <Continuous>  pantoprazole  Injectable 40 milliGRAM(s) IV Push daily    Genitourinary Medications  tamsulosin 0.4 milliGRAM(s) Oral at bedtime    Hematologic/Oncologic Medications    Antimicrobial/Immunologic Medications    Endocrine/Metabolic Medications  atorvastatin 20 milliGRAM(s) Oral at bedtime  finasteride 5 milliGRAM(s) Oral daily  insulin lispro (ADMELOG) corrective regimen sliding scale   SubCutaneous every 6 hours    Topical/Other Medications  chlorhexidine 2% Cloths 1 Application(s) Topical <User Schedule>    --------------------------------------------------------------------------------------    VITAL SIGNS, INS/OUTS (last 24 hours):  --------------------------------------------------------------------------------------   T(C): 36.7 (05-23-24 @ 22:45), Max: 36.8 (05-23-24 @ 00:25)  HR: 64 (05-24-24 @ 00:00) (60 - 82)  BP: 131/62 (05-24-24 @ 00:00) (105/54 - 179/80)  BP(mean): 89 (05-24-24 @ 00:00) (77 - 118)  ABP: --  ABP(mean): --  RR: 18 (05-24-24 @ 00:00) (12 - 31)  SpO2: 100% (05-24-24 @ 00:00) (94% - 100%)  CVP(mm Hg): --  CI: --  CAPILLARY BLOOD GLUCOSE      POCT Blood Glucose.: 225 mg/dL (23 May 2024 17:43)  POCT Blood Glucose.: 221 mg/dL (23 May 2024 12:51)  POCT Blood Glucose.: 223 mg/dL (23 May 2024 08:27)   N/A    05-22 @ 07:01  -  05-23 @ 07:00  --------------------------------------------------------  IN:    IV PiggyBack: 100 mL    Lactated Ringers: 375 mL  Total IN: 475 mL    OUT:    Indwelling Catheter - Urethral (mL): 715 mL  Total OUT: 715 mL    Total NET: -240 mL      05-23 @ 07:01  -  05-24 @ 00:06  --------------------------------------------------------  IN:    IV PiggyBack: 100 mL    Lactated Ringers: 200 mL    Platelets - Single Donor: 225 mL    PRBCs (Packed Red Blood Cells): 600 mL  Total IN: 1125 mL    OUT:    Indwelling Catheter - Urethral (mL): 1830 mL    Stool (mL): 180 mL  Total OUT: 2010 mL    Total NET: -885 mL      -------------------------------    EXAM  NEUROLOGY: Normal, NAD, alert, oriented x3, no focal deficits.  HEENT: Normocephalic, atraumatic, EOMI.   RESPIRATORY: Lungs clear to auscultation, Normal expansion/effort.   -- Mechanical Ventilation:   CARDIOVASCULAR: S1, S2.  Regular rate and rhythm.   GI/NUTRITION: Abdomen soft, Non-tender, Non-distended.   VASCULAR: Extremities warm, pink, well-perfused.  MSK: All extremities moving spontaneously without limitations.  SKIN: Good skin turgor, no skin breakdown.   LINES:      LABS  --------------------------------------------------------------------------------------   CBC (05-23 @ 22:31)                              8.3<L>                         6.15    )----------------(  194        --    % Neutrophils, --    % Lymphocytes, ANC: --                                  27.0<L>              CBC (05-23 @ 18:36)                              8.5<L>                         7.91    )----------------(  197        --    % Neutrophils, --    % Lymphocytes, ANC: --                                  27.8<L>                BMP (05-23 @ 07:11)             138     |  105     |  24<H> 		Ca++ --      Ca 8.1<L>             ---------------------------------( 200<H>		Mg 2.3                4.6     |  22      |  1.45<H>			Ph 4.3     BMP (05-22 @ 14:37)             137     |  103     |  21    		Ca++ --      Ca 8.0<L>             ---------------------------------( 190<H>		Mg 2.2                4.2     |  21<L>   |  1.15  			Ph 3.3         Coags (05-23 @ 19:31)  aPTT 28.7 / INR 1.03 / PT 11.3    ABG (05-23 @ 22:25)      /  /  /  /  / %     Lactate:   0.8  ABG (05-23 @ 19:28)      /  /  /  /  / %     Lactate:   2.4<H>    VBG (05-23 @ 22:25)     7.40 / 40<L> / 59<H> / 25 / 0.0 / 91.9<H>%  VBG (05-23 @ 19:28)     7.34 / 46 / 30 / 25 / -1.1 / 46.6<L>%      --------------------------------------------------------------------------------------    IMAGING STUDIES:

## 2024-05-24 NOTE — PROGRESS NOTE ADULT - SUBJECTIVE AND OBJECTIVE BOX
Interval Events:   -Was scoped overnight. No bleeding since procedure. Had 2u pRBCs w/ Hb 8.3 --> 8.7. Repeat this morning 7.2 w/o additional transfusion      Hospital Medications:  acetaminophen     Tablet .. 1000 milliGRAM(s) Oral every 6 hours  atorvastatin 20 milliGRAM(s) Oral at bedtime  chlorhexidine 2% Cloths 1 Application(s) Topical <User Schedule>  finasteride 5 milliGRAM(s) Oral daily  insulin lispro (ADMELOG) corrective regimen sliding scale   SubCutaneous every 6 hours  lactated ringers. 1000 milliLiter(s) (100 mL/Hr) IV Continuous <Continuous>  oxyCODONE    IR 2.5 milliGRAM(s) Oral every 4 hours PRN  oxyCODONE    IR 5 milliGRAM(s) Oral every 4 hours PRN  pantoprazole  Injectable 40 milliGRAM(s) IV Push daily  tamsulosin 0.4 milliGRAM(s) Oral at bedtime          24 @ 07:01  -  24 @ 07:00  --------------------------------------------------------  IN: 0 mL / OUT: 800 mL / NET: -800 mL      PHYSICAL EXAM:   Vital Signs:  Vital Signs Last 24 Hrs  T(C): 36.7 (24 May 2024 07:00), Max: 36.8 (23 May 2024 17:47)  T(F): 98 (24 May 2024 07:00), Max: 98.3 (24 May 2024 03:00)  HR: 62 (24 May 2024 09:00) (60 - 82)  BP: 129/60 (24 May 2024 09:00) (109/54 - 179/80)  BP(mean): 86 (24 May 2024 09:00) (77 - 118)  RR: 14 (24 May 2024 09:00) (12 - 31)  SpO2: 99% (24 May 2024 09:00) (94% - 100%)    Parameters below as of 24 May 2024 07:00  Patient On (Oxygen Delivery Method): room air      Daily Height in cm: 172.72 (23 May 2024 22:45)    Daily Weight in k.4 (23 May 2024 19:00)  GENERAL:  NAD, Appears stated age  HEENT:  NC/AT,  conjunctivae clear and pink, sclera -anicteric  CHEST:  Normal Effort, no signs of resp distress  HEART:  RRR, HD stable  ABDOMEN:  Soft, non-tender, non-distended  EXTREMITIES:  no cyanosis or edema  SKIN:  Warm & Dry. No rash or erythema  NEURO:  Alert, oriented, no focal deficit  LABS:                        7.2    6.08  )-----------( 155      ( 24 May 2024 06:16 )             22.9     Last Hb:Hemoglobin: 7.2 g/dL (24 @ 06:16)  Hemoglobin: 8.7 g/dL (24 @ 02:07)  Hemoglobin: 8.3 g/dL (24 @ 22:31)               142   |  108   |  18                 Ca: 8.3    BMP:   ----------------------------< 220    M.3   (24 @ 02:07)             4.1    |  23    | 1.13               Ph: 3.4      LFT:     TPro: 6.8 / Alb: 4.1 / TBili: 0.3 / DBili: x / AST: 14 / ALT: 12 / AlkPhos: 61   (24 @ 18:41)    Creatinine: 1.13 mg/dL  Creatinine: 1.45 mg/dL  Creatinine: 1.15 mg/dL        PT/INR - ( 24 May 2024 02:07 )   PT: 11.6 sec;   INR: 1.06 ratio         PTT - ( 24 May 2024 02:07 )  PTT:35.5 sec  Urinalysis Basic - ( 24 May 2024 02:07 )    Color: x / Appearance: x / SG: x / pH: x  Gluc: 220 mg/dL / Ketone: x  / Bili: x / Urobili: x   Blood: x / Protein: x / Nitrite: x   Leuk Esterase: x / RBC: x / WBC x   Sq Epi: x / Non Sq Epi: x / Bacteria: x      < from: CT Abdomen and Pelvis w/ IV Cont (24 @ 17:57) >  IMPRESSION:    Status post right colectomy with ileocolonic anastomosis. Active contrast   extravasation at level of the colonic resection margin.  Moderate pneumoperitoneum which may represent expected postoperative   change.    These findings were discussed with Dr. Das at 2024 7:04 PM by Dr. Collins with read back confirmation.    < end of copied text >      < from: Colonoscopy (24 @ 23:37) >  Findings:       Clotted blood was found in the entire colon. Anastamosis site with large overlying clot with        area of fresh red blood. Area was suctioned without any notable underlying lesion but with        continued bright red blood. To stop active bleeding, hemostatic spray with NexPowder was        deployed. A single cannister of spray was applied. There was no bleeding at the end of the        procedure.       Pictures not available due to malfunction of the equipment                                                                                                        Impression:          - Blood and clot in the entire examined colon.              -Active bleeding noted at the anastamosis. Underlying pathology obscured by                        large adherent clot.                       NexPowder applied into actively pooling fresh red blood.                       Bleeding resolved.                       - No specimens collected.  Recommendation:      - Observe patient in SICU for observation.                       - Monitor H/H and ongoing bleeding                       - If rebleeding, management as discussed with surgical attending    < end of copied text >

## 2024-05-24 NOTE — PROGRESS NOTE ADULT - ASSESSMENT
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     5/22: Lap R hemicolectomy  PLAN  NEURO  - Pain: Tylenol, Oxy  - Sedation:     RESP  - Maintain O2 saturation >92%  - AM CXRs    CV  - MAP goal >65  - Pressors: None  - Home Rx: Atorvastatin    GI/NUTRITION  - Diet: NPO  - Protonix IV daily  - GI recs: plan to scope 5/23    RENAL/  - Saenz  - Home Rx: Finasteride, Tamsulosin  - LR@100    HEME  - DVT ppx: None    ID  - ABX: None    ENDO  - Monitor blood glucose     LINES  - PIVs    CODE: FULL  DISPO: SICU

## 2024-05-25 LAB
ANION GAP SERPL CALC-SCNC: 10 MMOL/L — SIGNIFICANT CHANGE UP (ref 5–17)
APTT BLD: 26.5 SEC — SIGNIFICANT CHANGE UP (ref 24.5–35.6)
BASE EXCESS BLDV CALC-SCNC: 0.9 MMOL/L — SIGNIFICANT CHANGE UP (ref -2–3)
BASE EXCESS BLDV CALC-SCNC: 1.1 MMOL/L — SIGNIFICANT CHANGE UP (ref -2–3)
BUN SERPL-MCNC: 15 MG/DL — SIGNIFICANT CHANGE UP (ref 7–23)
CA-I SERPL-SCNC: 1.18 MMOL/L — SIGNIFICANT CHANGE UP (ref 1.15–1.33)
CA-I SERPL-SCNC: 1.25 MMOL/L — SIGNIFICANT CHANGE UP (ref 1.15–1.33)
CALCIUM SERPL-MCNC: 8.1 MG/DL — LOW (ref 8.4–10.5)
CHLORIDE BLDV-SCNC: 102 MMOL/L — SIGNIFICANT CHANGE UP (ref 96–108)
CHLORIDE BLDV-SCNC: 107 MMOL/L — SIGNIFICANT CHANGE UP (ref 96–108)
CHLORIDE SERPL-SCNC: 107 MMOL/L — SIGNIFICANT CHANGE UP (ref 96–108)
CO2 BLDV-SCNC: 26 MMOL/L — SIGNIFICANT CHANGE UP (ref 22–26)
CO2 BLDV-SCNC: 26 MMOL/L — SIGNIFICANT CHANGE UP (ref 22–26)
CO2 SERPL-SCNC: 23 MMOL/L — SIGNIFICANT CHANGE UP (ref 22–31)
CREAT SERPL-MCNC: 1 MG/DL — SIGNIFICANT CHANGE UP (ref 0.5–1.3)
EGFR: 75 ML/MIN/1.73M2 — SIGNIFICANT CHANGE UP
GAS PNL BLDV: 133 MMOL/L — LOW (ref 136–145)
GAS PNL BLDV: 136 MMOL/L — SIGNIFICANT CHANGE UP (ref 136–145)
GAS PNL BLDV: SIGNIFICANT CHANGE UP
GLUCOSE BLDC GLUCOMTR-MCNC: 214 MG/DL — HIGH (ref 70–99)
GLUCOSE BLDC GLUCOMTR-MCNC: 225 MG/DL — HIGH (ref 70–99)
GLUCOSE BLDC GLUCOMTR-MCNC: 233 MG/DL — HIGH (ref 70–99)
GLUCOSE BLDV-MCNC: 160 MG/DL — HIGH (ref 70–99)
GLUCOSE BLDV-MCNC: 238 MG/DL — HIGH (ref 70–99)
GLUCOSE SERPL-MCNC: 145 MG/DL — HIGH (ref 70–99)
HCO3 BLDV-SCNC: 25 MMOL/L — SIGNIFICANT CHANGE UP (ref 22–29)
HCO3 BLDV-SCNC: 25 MMOL/L — SIGNIFICANT CHANGE UP (ref 22–29)
HCT VFR BLD CALC: 27.1 % — LOW (ref 39–50)
HCT VFR BLD CALC: 30.2 % — LOW (ref 39–50)
HCT VFR BLDA CALC: 28 % — LOW (ref 39–51)
HCT VFR BLDA CALC: 31 % — LOW (ref 39–51)
HGB BLD CALC-MCNC: 10.4 G/DL — LOW (ref 12.6–17.4)
HGB BLD CALC-MCNC: 9.3 G/DL — LOW (ref 12.6–17.4)
HGB BLD-MCNC: 10.2 G/DL — LOW (ref 13–17)
HGB BLD-MCNC: 8.9 G/DL — LOW (ref 13–17)
HOROWITZ INDEX BLDV+IHG-RTO: 21 — SIGNIFICANT CHANGE UP
HOROWITZ INDEX BLDV+IHG-RTO: 21 — SIGNIFICANT CHANGE UP
INR BLD: 1.11 RATIO — SIGNIFICANT CHANGE UP (ref 0.85–1.18)
LACTATE BLDV-MCNC: 1 MMOL/L — SIGNIFICANT CHANGE UP (ref 0.5–2)
LACTATE BLDV-MCNC: 1.1 MMOL/L — SIGNIFICANT CHANGE UP (ref 0.5–2)
MAGNESIUM SERPL-MCNC: 2 MG/DL — SIGNIFICANT CHANGE UP (ref 1.6–2.6)
MCHC RBC-ENTMCNC: 25.9 PG — LOW (ref 27–34)
MCHC RBC-ENTMCNC: 26.2 PG — LOW (ref 27–34)
MCHC RBC-ENTMCNC: 32.8 GM/DL — SIGNIFICANT CHANGE UP (ref 32–36)
MCHC RBC-ENTMCNC: 33.8 GM/DL — SIGNIFICANT CHANGE UP (ref 32–36)
MCV RBC AUTO: 77.6 FL — LOW (ref 80–100)
MCV RBC AUTO: 79 FL — LOW (ref 80–100)
NRBC # BLD: 0 /100 WBCS — SIGNIFICANT CHANGE UP (ref 0–0)
NRBC # BLD: 0 /100 WBCS — SIGNIFICANT CHANGE UP (ref 0–0)
PCO2 BLDV: 36 MMHG — LOW (ref 42–55)
PCO2 BLDV: 38 MMHG — LOW (ref 42–55)
PH BLDV: 7.43 — SIGNIFICANT CHANGE UP (ref 7.32–7.43)
PH BLDV: 7.45 — HIGH (ref 7.32–7.43)
PHOSPHATE SERPL-MCNC: 2.1 MG/DL — LOW (ref 2.5–4.5)
PLATELET # BLD AUTO: 155 K/UL — SIGNIFICANT CHANGE UP (ref 150–400)
PLATELET # BLD AUTO: 181 K/UL — SIGNIFICANT CHANGE UP (ref 150–400)
PO2 BLDV: 101 MMHG — HIGH (ref 25–45)
PO2 BLDV: 60 MMHG — HIGH (ref 25–45)
POTASSIUM BLDV-SCNC: 4.1 MMOL/L — SIGNIFICANT CHANGE UP (ref 3.5–5.1)
POTASSIUM BLDV-SCNC: 4.3 MMOL/L — SIGNIFICANT CHANGE UP (ref 3.5–5.1)
POTASSIUM SERPL-MCNC: 4.1 MMOL/L — SIGNIFICANT CHANGE UP (ref 3.5–5.3)
POTASSIUM SERPL-SCNC: 4.1 MMOL/L — SIGNIFICANT CHANGE UP (ref 3.5–5.3)
PROTHROM AB SERPL-ACNC: 11.6 SEC — SIGNIFICANT CHANGE UP (ref 9.5–13)
RBC # BLD: 3.43 M/UL — LOW (ref 4.2–5.8)
RBC # BLD: 3.89 M/UL — LOW (ref 4.2–5.8)
RBC # FLD: 20.7 % — HIGH (ref 10.3–14.5)
RBC # FLD: 21.1 % — HIGH (ref 10.3–14.5)
SAO2 % BLDV: 94.2 % — HIGH (ref 67–88)
SAO2 % BLDV: 99.1 % — HIGH (ref 67–88)
SODIUM SERPL-SCNC: 140 MMOL/L — SIGNIFICANT CHANGE UP (ref 135–145)
WBC # BLD: 4.88 K/UL — SIGNIFICANT CHANGE UP (ref 3.8–10.5)
WBC # BLD: 7.17 K/UL — SIGNIFICANT CHANGE UP (ref 3.8–10.5)
WBC # FLD AUTO: 4.88 K/UL — SIGNIFICANT CHANGE UP (ref 3.8–10.5)
WBC # FLD AUTO: 7.17 K/UL — SIGNIFICANT CHANGE UP (ref 3.8–10.5)

## 2024-05-25 PROCEDURE — 99233 SBSQ HOSP IP/OBS HIGH 50: CPT | Mod: GC

## 2024-05-25 RX ORDER — POLYETHYLENE GLYCOL 3350 17 G/17G
17 POWDER, FOR SOLUTION ORAL DAILY
Refills: 0 | Status: DISCONTINUED | OUTPATIENT
Start: 2024-05-25 | End: 2024-05-31

## 2024-05-25 RX ORDER — INSULIN LISPRO 100/ML
VIAL (ML) SUBCUTANEOUS AT BEDTIME
Refills: 0 | Status: DISCONTINUED | OUTPATIENT
Start: 2024-05-25 | End: 2024-05-26

## 2024-05-25 RX ORDER — INSULIN LISPRO 100/ML
VIAL (ML) SUBCUTANEOUS
Refills: 0 | Status: DISCONTINUED | OUTPATIENT
Start: 2024-05-25 | End: 2024-05-26

## 2024-05-25 RX ORDER — SODIUM CHLORIDE 9 MG/ML
1000 INJECTION, SOLUTION INTRAVENOUS
Refills: 0 | Status: DISCONTINUED | OUTPATIENT
Start: 2024-05-25 | End: 2024-05-26

## 2024-05-25 RX ORDER — LOSARTAN POTASSIUM 100 MG/1
100 TABLET, FILM COATED ORAL DAILY
Refills: 0 | Status: DISCONTINUED | OUTPATIENT
Start: 2024-05-25 | End: 2024-05-31

## 2024-05-25 RX ORDER — FAMOTIDINE 10 MG/ML
10 INJECTION INTRAVENOUS DAILY
Refills: 0 | Status: DISCONTINUED | OUTPATIENT
Start: 2024-05-25 | End: 2024-05-25

## 2024-05-25 RX ORDER — SENNA PLUS 8.6 MG/1
2 TABLET ORAL AT BEDTIME
Refills: 0 | Status: DISCONTINUED | OUTPATIENT
Start: 2024-05-25 | End: 2024-05-31

## 2024-05-25 RX ORDER — PANTOPRAZOLE SODIUM 20 MG/1
40 TABLET, DELAYED RELEASE ORAL EVERY 12 HOURS
Refills: 0 | Status: DISCONTINUED | OUTPATIENT
Start: 2024-05-25 | End: 2024-05-31

## 2024-05-25 RX ORDER — FAMOTIDINE 10 MG/ML
10 INJECTION INTRAVENOUS DAILY
Refills: 0 | Status: DISCONTINUED | OUTPATIENT
Start: 2024-05-25 | End: 2024-05-31

## 2024-05-25 RX ADMIN — Medication 1000 MILLIGRAM(S): at 14:15

## 2024-05-25 RX ADMIN — PANTOPRAZOLE SODIUM 40 MILLIGRAM(S): 20 TABLET, DELAYED RELEASE ORAL at 18:21

## 2024-05-25 RX ADMIN — ATORVASTATIN CALCIUM 20 MILLIGRAM(S): 80 TABLET, FILM COATED ORAL at 21:10

## 2024-05-25 RX ADMIN — LOSARTAN POTASSIUM 100 MILLIGRAM(S): 100 TABLET, FILM COATED ORAL at 11:46

## 2024-05-25 RX ADMIN — FINASTERIDE 5 MILLIGRAM(S): 5 TABLET, FILM COATED ORAL at 11:46

## 2024-05-25 RX ADMIN — Medication 4: at 11:47

## 2024-05-25 RX ADMIN — Medication 1000 MILLIGRAM(S): at 21:10

## 2024-05-25 RX ADMIN — Medication 1000 MILLIGRAM(S): at 00:00

## 2024-05-25 RX ADMIN — Medication 1000 MILLIGRAM(S): at 06:00

## 2024-05-25 RX ADMIN — Medication 4: at 18:32

## 2024-05-25 RX ADMIN — TAMSULOSIN HYDROCHLORIDE 0.4 MILLIGRAM(S): 0.4 CAPSULE ORAL at 21:09

## 2024-05-25 RX ADMIN — SENNA PLUS 2 TABLET(S): 8.6 TABLET ORAL at 21:10

## 2024-05-25 RX ADMIN — Medication 1000 MILLIGRAM(S): at 14:45

## 2024-05-25 RX ADMIN — Medication 1000 MILLIGRAM(S): at 06:45

## 2024-05-25 RX ADMIN — Medication 2: at 06:00

## 2024-05-25 RX ADMIN — CHLORHEXIDINE GLUCONATE 1 APPLICATION(S): 213 SOLUTION TOPICAL at 06:00

## 2024-05-25 RX ADMIN — Medication 255 MILLIMOLE(S): at 08:45

## 2024-05-25 RX ADMIN — SODIUM CHLORIDE 20 MILLILITER(S): 9 INJECTION, SOLUTION INTRAVENOUS at 19:00

## 2024-05-25 RX ADMIN — FAMOTIDINE 10 MILLIGRAM(S): 10 INJECTION INTRAVENOUS at 18:21

## 2024-05-25 NOTE — PROVIDER CONTACT NOTE (CHANGE IN STATUS NOTIFICATION) - ASSESSMENT
patient had small episode of melena, blood pressure stable. patient has not had bloody output per rectum since colonscopy on 5/22 overnight

## 2024-05-25 NOTE — PROGRESS NOTE ADULT - SUBJECTIVE AND OBJECTIVE BOX
General Surgery Daily Resident Progress Note    OVERNIGHT: MAHAD.     SUBJECTIVE: Pt seen and examined at bedside.     OBJECTIVE:  Vital Signs Last 24 Hrs  T(C): 36.9 (24 May 2024 23:00), Max: 36.9 (24 May 2024 19:00)  T(F): 98.5 (24 May 2024 23:00), Max: 98.5 (24 May 2024 23:00)  HR: 73 (25 May 2024 00:00) (60 - 74)  BP: 144/64 (25 May 2024 00:00) (118/58 - 185/76)  BP(mean): 92 (25 May 2024 00:00) (80 - 109)  RR: 18 (25 May 2024 00:00) (13 - 26)  SpO2: 93% (25 May 2024 00:00) (93% - 99%)    Parameters below as of 24 May 2024 19:00  Patient On (Oxygen Delivery Method): room air    PHYSICAL EXAM:  Gen: NAD, A&Ox3  Pulm: No respiratory distress, no subcostal retractions  CV: RRR, no JVD  Abd: Soft, NT, mild distension, incision with mild strikethrough   Extremities: Grossly symmetric     LABS:                        9.1    5.55  )-----------( 145      ( 24 May 2024 21:04 )             27.0     05-24    142  |  108  |  18  ----------------------------<  220<H>  4.1   |  23  |  1.13    Ca    8.3<L>      24 May 2024 02:07  Phos  3.4     05-24  Mg     2.3     05-24      PT/INR - ( 24 May 2024 02:07 )   PT: 11.6 sec;   INR: 1.06 ratio         PTT - ( 24 May 2024 02:07 )  PTT:35.5 sec  Urinalysis Basic - ( 24 May 2024 02:07 )    Color: x / Appearance: x / SG: x / pH: x  Gluc: 220 mg/dL / Ketone: x  / Bili: x / Urobili: x   Blood: x / Protein: x / Nitrite: x   Leuk Esterase: x / RBC: x / WBC x   Sq Epi: x / Non Sq Epi: x / Bacteria: x

## 2024-05-25 NOTE — PROGRESS NOTE ADULT - ASSESSMENT
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    Now s/p Lap R hemicolectomy. Tolerated procedure well. Course complicated by lower GI bleed, found to have bleeding at staple line on CTA. Transferred to SICU for hemodynamic monitoring.     Plan  - Monitor H&H   - f/u TOV   - s/p GI scope at bedside > active bleeding at anastomosis > application Nextpowder, resolution of bleeding   - NPO/IVF  - Appreciate excellent SICU care   - PT no needs     Green Team   [id number].   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    Now s/p Lap R hemicolectomy. Tolerated procedure well. Course complicated by lower GI bleed, found to have bleeding at staple line on CTA. Transferred to SICU for hemodynamic monitoring.     Plan  - Monitor H&H   - s/p GI scope at bedside > active bleeding at anastomosis > application Nextpowder, resolution of bleeding   - NPO/IVF  - Appreciate excellent SICU care   - PT no needs     Green Team   [id number].   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    Now s/p Lap R hemicolectomy. Tolerated procedure well. Course complicated by lower GI bleed, found to have bleeding at staple line on CTA. Transferred to SICU for hemodynamic monitoring.     Plan  - Monitor H&H   - s/p GI scope at bedside > active bleeding at anastomosis > application Nextpowder, resolution of bleeding   - CLD  - Appreciate excellent SICU care   - PT no needs     Green Team   [id number].

## 2024-05-25 NOTE — PROGRESS NOTE ADULT - ASSESSMENT
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     5/22: Lap R hemicolectomy  5/24: GI scope w/Nextpowder application    PLAN  NEURO  - Pain: Tylenol, Oxy  - Sedation:     RESP  - Maintain O2 saturation >92%  - AM CXRs    CV  - MAP goal >65  - Pressors: None  - Home Rx: Atorvastatin    GI/NUTRITION  - Diet: NPO  - Protonix IV daily  - GI recs: plan to scope 5/23    RENAL/  - Saenz  - Home Rx: Finasteride, Tamsulosin  - LR@100    HEME  - DVT ppx: None    ID  - ABX: None    ENDO  - Monitor blood glucose     LINES  - PIVs    CODE: FULL  DISPO: SICU

## 2024-05-25 NOTE — PROGRESS NOTE ADULT - SUBJECTIVE AND OBJECTIVE BOX
SICU Daily Progress Note  =====================================================  Interval events  - 1xpRBC, 1xPlasma  - GI consulted - scope bedside  - 2x fleet enemas  - Bedside colonoscopy, hemostatic spray        Allergies: No Known Drug Allergies  CT Dye (Vomiting; Diarrhea; Flushing)      MEDICATIONS:   --------------------------------------------------------------------------------------  Neurologic Medications  acetaminophen     Tablet .. 1000 milliGRAM(s) Oral every 6 hours  oxyCODONE    IR 5 milliGRAM(s) Oral every 4 hours PRN Severe Pain (7 - 10)  oxyCODONE    IR 2.5 milliGRAM(s) Oral every 4 hours PRN Moderate Pain (4 - 6)    Respiratory Medications    Cardiovascular Medications    Gastrointestinal Medications  lactated ringers. 1000 milliLiter(s) IV Continuous <Continuous>  pantoprazole  Injectable 40 milliGRAM(s) IV Push daily    Genitourinary Medications  tamsulosin 0.4 milliGRAM(s) Oral at bedtime    Hematologic/Oncologic Medications    Antimicrobial/Immunologic Medications    Endocrine/Metabolic Medications  atorvastatin 20 milliGRAM(s) Oral at bedtime  finasteride 5 milliGRAM(s) Oral daily  insulin lispro (ADMELOG) corrective regimen sliding scale   SubCutaneous every 6 hours    Topical/Other Medications  chlorhexidine 2% Cloths 1 Application(s) Topical <User Schedule>    --------------------------------------------------------------------------------------    VITAL SIGNS, INS/OUTS (last 24 hours):  --------------------------------------------------------------------------------------  T(C): 36.9 (05-24-24 @ 19:00), Max: 36.9 (05-24-24 @ 19:00)  HR: 72 (05-24-24 @ 22:00) (60 - 75)  BP: 141/64 (05-24-24 @ 22:00) (109/54 - 185/76)  BP(mean): 92 (05-24-24 @ 22:00) (78 - 111)  ABP: --  ABP(mean): --  RR: 20 (05-24-24 @ 22:00) (12 - 26)  SpO2: 93% (05-24-24 @ 22:00) (93% - 100%)  CVP(mm Hg): --  CI: --  CAPILLARY BLOOD GLUCOSE      POCT Blood Glucose.: 157 mg/dL (24 May 2024 23:20)  POCT Blood Glucose.: 148 mg/dL (24 May 2024 17:16)  POCT Blood Glucose.: 178 mg/dL (24 May 2024 06:30)  POCT Blood Glucose.: 227 mg/dL (24 May 2024 01:15)   N/A    05-23 @ 07:01  -  05-24 @ 07:00  --------------------------------------------------------  IN:    IV PiggyBack: 200 mL    Lactated Ringers: 1200 mL    Plasma: 600 mL    Platelets - Single Donor: 225 mL    PRBCs (Packed Red Blood Cells): 900 mL  Total IN: 3125 mL    OUT:    Indwelling Catheter - Urethral (mL): 2795 mL    Stool (mL): 800 mL  Total OUT: 3595 mL    Total NET: -470 mL      05-24 @ 07:01  -  05-25 @ 00:06  --------------------------------------------------------  IN:    Lactated Ringers: 1500 mL    Oral Fluid: 60 mL    Plasma: 200 mL    Platelets - Single Donor: 225 mL    PRBCs (Packed Red Blood Cells): 600 mL  Total IN: 2585 mL    OUT:    Indwelling Catheter - Urethral (mL): 1075 mL  Total OUT: 1075 mL    Total NET: 1510 mL        --------------------------------------------------------------------------------------    EXAM  NEUROLOGY: Normal, NAD, alert, oriented x3, no focal deficits.  HEENT: Normocephalic, atraumatic, EOMI.   RESPIRATORY: Lungs clear to auscultation, Normal expansion/effort.   -- Mechanical Ventilation:   CARDIOVASCULAR: S1, S2.  Regular rate and rhythm.   GI/NUTRITION: Abdomen soft, Non-tender, Non-distended.   VASCULAR: Extremities warm, pink, well-perfused.  MSK: All extremities moving spontaneously without limitations.  SKIN: Good skin turgor, no skin breakdown.   LINES:      LABS  --------------------------------------------------------------------------------------  CBC (05-24 @ 21:04)                              9.1<L>                         5.55    )----------------(  145<L>     --    % Neutrophils, --    % Lymphocytes, ANC: --                                  27.0<L>              CBC (05-24 @ 14:42)                              7.5<L>                         6.15    )----------------(  163        --    % Neutrophils, --    % Lymphocytes, ANC: --                                  23.1<L>                BMP (05-24 @ 02:07)             142     |  108     |  18    		Ca++ --      Ca 8.3<L>             ---------------------------------( 220<H>		Mg 2.3                4.1     |  23      |  1.13  			Ph 3.4         Coags (05-24 @ 02:07)  aPTT 35.5 / INR 1.06 / PT 11.6  Coags (05-23 @ 19:31)  aPTT 28.7 / INR 1.03 / PT 11.3    ABG (05-24 @ 20:48)      /  /  /  /  / %     Lactate:   0.9  ABG (05-24 @ 12:00)      /  /  /  /  / %     Lactate:   1.2    VBG (05-24 @ 20:48)     7.40 / 40<L> / 61<H> / 25 / 0.0 / 94.8<H>%  VBG (05-24 @ 12:00)     7.40 / 44 / 40 / 27 / 2.3 / 74.7%      --------------------------------------------------------------------------------------    IMAGING STUDIES: SICU Daily Progress Note  =====================================================  Interval events  - 1xpRBC, 1xPlasma  - GI consulted - scope bedside  - 2x fleet enemas  - Bedside colonoscopy, hemostatic spray  - DC juarez @ [facility], f/u TO      Allergies: No Known Drug Allergies  CT Dye (Vomiting; Diarrhea; Flushing)      MEDICATIONS:   --------------------------------------------------------------------------------------  Neurologic Medications  acetaminophen     Tablet .. 1000 milliGRAM(s) Oral every 6 hours  oxyCODONE    IR 5 milliGRAM(s) Oral every 4 hours PRN Severe Pain (7 - 10)  oxyCODONE    IR 2.5 milliGRAM(s) Oral every 4 hours PRN Moderate Pain (4 - 6)    Respiratory Medications    Cardiovascular Medications    Gastrointestinal Medications  lactated ringers. 1000 milliLiter(s) IV Continuous <Continuous>  pantoprazole  Injectable 40 milliGRAM(s) IV Push daily    Genitourinary Medications  tamsulosin 0.4 milliGRAM(s) Oral at bedtime    Hematologic/Oncologic Medications    Antimicrobial/Immunologic Medications    Endocrine/Metabolic Medications  atorvastatin 20 milliGRAM(s) Oral at bedtime  finasteride 5 milliGRAM(s) Oral daily  insulin lispro (ADMELOG) corrective regimen sliding scale   SubCutaneous every 6 hours    Topical/Other Medications  chlorhexidine 2% Cloths 1 Application(s) Topical <User Schedule>    --------------------------------------------------------------------------------------    VITAL SIGNS, INS/OUTS (last 24 hours):  --------------------------------------------------------------------------------------  T(C): 36.9 (05-24-24 @ 19:00), Max: 36.9 (05-24-24 @ 19:00)  HR: 72 (05-24-24 @ 22:00) (60 - 75)  BP: 141/64 (05-24-24 @ 22:00) (109/54 - 185/76)  BP(mean): 92 (05-24-24 @ 22:00) (78 - 111)  ABP: --  ABP(mean): --  RR: 20 (05-24-24 @ 22:00) (12 - 26)  SpO2: 93% (05-24-24 @ 22:00) (93% - 100%)  CVP(mm Hg): --  CI: --  CAPILLARY BLOOD GLUCOSE      POCT Blood Glucose.: 157 mg/dL (24 May 2024 23:20)  POCT Blood Glucose.: 148 mg/dL (24 May 2024 17:16)  POCT Blood Glucose.: 178 mg/dL (24 May 2024 06:30)  POCT Blood Glucose.: 227 mg/dL (24 May 2024 01:15)   N/A    05-23 @ 07:01  -  05-24 @ 07:00  --------------------------------------------------------  IN:    IV PiggyBack: 200 mL    Lactated Ringers: 1200 mL    Plasma: 600 mL    Platelets - Single Donor: 225 mL    PRBCs (Packed Red Blood Cells): 900 mL  Total IN: 3125 mL    OUT:    Indwelling Catheter - Urethral (mL): 2795 mL    Stool (mL): 800 mL  Total OUT: 3595 mL    Total NET: -470 mL      05-24 @ 07:01  -  05-25 @ 00:06  --------------------------------------------------------  IN:    Lactated Ringers: 1500 mL    Oral Fluid: 60 mL    Plasma: 200 mL    Platelets - Single Donor: 225 mL    PRBCs (Packed Red Blood Cells): 600 mL  Total IN: 2585 mL    OUT:    Indwelling Catheter - Urethral (mL): 1075 mL  Total OUT: 1075 mL    Total NET: 1510 mL        --------------------------------------------------------------------------------------    EXAM  NEUROLOGY: Normal, NAD, alert, oriented x3, no focal deficits.  HEENT: Normocephalic, atraumatic, EOMI.   RESPIRATORY: Lungs clear to auscultation, Normal expansion/effort.   -- Mechanical Ventilation:   CARDIOVASCULAR: S1, S2.  Regular rate and rhythm.   GI/NUTRITION: Abdomen soft, Non-tender, Non-distended.   VASCULAR: Extremities warm, pink, well-perfused.  MSK: All extremities moving spontaneously without limitations.  SKIN: Good skin turgor, no skin breakdown.   LINES:      LABS  --------------------------------------------------------------------------------------  CBC (05-24 @ 21:04)                              9.1<L>                         5.55    )----------------(  145<L>     --    % Neutrophils, --    % Lymphocytes, ANC: --                                  27.0<L>              CBC (05-24 @ 14:42)                              7.5<L>                         6.15    )----------------(  163        --    % Neutrophils, --    % Lymphocytes, ANC: --                                  23.1<L>                BMP (05-24 @ 02:07)             142     |  108     |  18    		Ca++ --      Ca 8.3<L>             ---------------------------------( 220<H>		Mg 2.3                4.1     |  23      |  1.13  			Ph 3.4         Coags (05-24 @ 02:07)  aPTT 35.5 / INR 1.06 / PT 11.6  Coags (05-23 @ 19:31)  aPTT 28.7 / INR 1.03 / PT 11.3    ABG (05-24 @ 20:48)      /  /  /  /  / %     Lactate:   0.9  ABG (05-24 @ 12:00)      /  /  /  /  / %     Lactate:   1.2    VBG (05-24 @ 20:48)     7.40 / 40<L> / 61<H> / 25 / 0.0 / 94.8<H>%  VBG (05-24 @ 12:00)     7.40 / 44 / 40 / 27 / 2.3 / 74.7%      --------------------------------------------------------------------------------------    IMAGING STUDIES:

## 2024-05-26 LAB
ANION GAP SERPL CALC-SCNC: 9 MMOL/L — SIGNIFICANT CHANGE UP (ref 5–17)
APTT BLD: 25.8 SEC — SIGNIFICANT CHANGE UP (ref 24.5–35.6)
BASE EXCESS BLDV CALC-SCNC: 0.2 MMOL/L — SIGNIFICANT CHANGE UP (ref -2–3)
BUN SERPL-MCNC: 14 MG/DL — SIGNIFICANT CHANGE UP (ref 7–23)
CA-I SERPL-SCNC: 1.23 MMOL/L — SIGNIFICANT CHANGE UP (ref 1.15–1.33)
CALCIUM SERPL-MCNC: 8.3 MG/DL — LOW (ref 8.4–10.5)
CHLORIDE BLDV-SCNC: 102 MMOL/L — SIGNIFICANT CHANGE UP (ref 96–108)
CHLORIDE SERPL-SCNC: 102 MMOL/L — SIGNIFICANT CHANGE UP (ref 96–108)
CO2 BLDV-SCNC: 26 MMOL/L — SIGNIFICANT CHANGE UP (ref 22–26)
CO2 SERPL-SCNC: 23 MMOL/L — SIGNIFICANT CHANGE UP (ref 22–31)
CREAT SERPL-MCNC: 0.95 MG/DL — SIGNIFICANT CHANGE UP (ref 0.5–1.3)
EGFR: 80 ML/MIN/1.73M2 — SIGNIFICANT CHANGE UP
GAS PNL BLDV: 133 MMOL/L — LOW (ref 136–145)
GAS PNL BLDV: SIGNIFICANT CHANGE UP
GAS PNL BLDV: SIGNIFICANT CHANGE UP
GLUCOSE BLDC GLUCOMTR-MCNC: 148 MG/DL — HIGH (ref 70–99)
GLUCOSE BLDC GLUCOMTR-MCNC: 180 MG/DL — HIGH (ref 70–99)
GLUCOSE BLDC GLUCOMTR-MCNC: 185 MG/DL — HIGH (ref 70–99)
GLUCOSE BLDC GLUCOMTR-MCNC: 196 MG/DL — HIGH (ref 70–99)
GLUCOSE BLDV-MCNC: 179 MG/DL — HIGH (ref 70–99)
GLUCOSE SERPL-MCNC: 190 MG/DL — HIGH (ref 70–99)
HCO3 BLDV-SCNC: 25 MMOL/L — SIGNIFICANT CHANGE UP (ref 22–29)
HCT VFR BLD CALC: 29.4 % — LOW (ref 39–50)
HCT VFR BLD CALC: 30 % — LOW (ref 39–50)
HCT VFR BLD CALC: 32 % — LOW (ref 39–50)
HCT VFR BLDA CALC: 30 % — LOW (ref 39–51)
HGB BLD CALC-MCNC: 10.1 G/DL — LOW (ref 12.6–17.4)
HGB BLD-MCNC: 10.4 G/DL — LOW (ref 13–17)
HGB BLD-MCNC: 9.6 G/DL — LOW (ref 13–17)
HGB BLD-MCNC: 9.8 G/DL — LOW (ref 13–17)
HOROWITZ INDEX BLDV+IHG-RTO: 21 — SIGNIFICANT CHANGE UP
INR BLD: 1.06 RATIO — SIGNIFICANT CHANGE UP (ref 0.85–1.18)
LACTATE BLDV-MCNC: 0.8 MMOL/L — SIGNIFICANT CHANGE UP (ref 0.5–2)
MAGNESIUM SERPL-MCNC: 2 MG/DL — SIGNIFICANT CHANGE UP (ref 1.6–2.6)
MCHC RBC-ENTMCNC: 25.8 PG — LOW (ref 27–34)
MCHC RBC-ENTMCNC: 25.9 PG — LOW (ref 27–34)
MCHC RBC-ENTMCNC: 26.1 PG — LOW (ref 27–34)
MCHC RBC-ENTMCNC: 32.5 GM/DL — SIGNIFICANT CHANGE UP (ref 32–36)
MCHC RBC-ENTMCNC: 32.7 GM/DL — SIGNIFICANT CHANGE UP (ref 32–36)
MCHC RBC-ENTMCNC: 32.7 GM/DL — SIGNIFICANT CHANGE UP (ref 32–36)
MCV RBC AUTO: 79 FL — LOW (ref 80–100)
MCV RBC AUTO: 79.8 FL — LOW (ref 80–100)
MCV RBC AUTO: 79.8 FL — LOW (ref 80–100)
NRBC # BLD: 0 /100 WBCS — SIGNIFICANT CHANGE UP (ref 0–0)
PCO2 BLDV: 38 MMHG — LOW (ref 42–55)
PH BLDV: 7.42 — SIGNIFICANT CHANGE UP (ref 7.32–7.43)
PHOSPHATE SERPL-MCNC: 2.6 MG/DL — SIGNIFICANT CHANGE UP (ref 2.5–4.5)
PLATELET # BLD AUTO: 188 K/UL — SIGNIFICANT CHANGE UP (ref 150–400)
PLATELET # BLD AUTO: 201 K/UL — SIGNIFICANT CHANGE UP (ref 150–400)
PLATELET # BLD AUTO: 208 K/UL — SIGNIFICANT CHANGE UP (ref 150–400)
PO2 BLDV: 64 MMHG — HIGH (ref 25–45)
POTASSIUM BLDV-SCNC: 4.3 MMOL/L — SIGNIFICANT CHANGE UP (ref 3.5–5.1)
POTASSIUM SERPL-MCNC: 4.1 MMOL/L — SIGNIFICANT CHANGE UP (ref 3.5–5.3)
POTASSIUM SERPL-SCNC: 4.1 MMOL/L — SIGNIFICANT CHANGE UP (ref 3.5–5.3)
PROTHROM AB SERPL-ACNC: 11.6 SEC — SIGNIFICANT CHANGE UP (ref 9.5–13)
RBC # BLD: 3.72 M/UL — LOW (ref 4.2–5.8)
RBC # BLD: 3.76 M/UL — LOW (ref 4.2–5.8)
RBC # BLD: 4.01 M/UL — LOW (ref 4.2–5.8)
RBC # FLD: 21 % — HIGH (ref 10.3–14.5)
RBC # FLD: 21.3 % — HIGH (ref 10.3–14.5)
RBC # FLD: 21.6 % — HIGH (ref 10.3–14.5)
SAO2 % BLDV: 94.1 % — HIGH (ref 67–88)
SODIUM SERPL-SCNC: 134 MMOL/L — LOW (ref 135–145)
WBC # BLD: 5.46 K/UL — SIGNIFICANT CHANGE UP (ref 3.8–10.5)
WBC # BLD: 6.26 K/UL — SIGNIFICANT CHANGE UP (ref 3.8–10.5)
WBC # BLD: 7.68 K/UL — SIGNIFICANT CHANGE UP (ref 3.8–10.5)
WBC # FLD AUTO: 5.46 K/UL — SIGNIFICANT CHANGE UP (ref 3.8–10.5)
WBC # FLD AUTO: 6.26 K/UL — SIGNIFICANT CHANGE UP (ref 3.8–10.5)
WBC # FLD AUTO: 7.68 K/UL — SIGNIFICANT CHANGE UP (ref 3.8–10.5)

## 2024-05-26 PROCEDURE — 99233 SBSQ HOSP IP/OBS HIGH 50: CPT | Mod: GC

## 2024-05-26 RX ORDER — METOPROLOL TARTRATE 50 MG
12.5 TABLET ORAL EVERY 12 HOURS
Refills: 0 | Status: DISCONTINUED | OUTPATIENT
Start: 2024-05-26 | End: 2024-05-31

## 2024-05-26 RX ORDER — SODIUM CHLORIDE 9 MG/ML
1000 INJECTION, SOLUTION INTRAVENOUS
Refills: 0 | Status: DISCONTINUED | OUTPATIENT
Start: 2024-05-26 | End: 2024-05-28

## 2024-05-26 RX ORDER — INSULIN LISPRO 100/ML
VIAL (ML) SUBCUTANEOUS EVERY 6 HOURS
Refills: 0 | Status: DISCONTINUED | OUTPATIENT
Start: 2024-05-26 | End: 2024-05-27

## 2024-05-26 RX ORDER — HYDRALAZINE HCL 50 MG
10 TABLET ORAL ONCE
Refills: 0 | Status: COMPLETED | OUTPATIENT
Start: 2024-05-26 | End: 2024-05-26

## 2024-05-26 RX ORDER — METOPROLOL TARTRATE 50 MG
12.5 TABLET ORAL ONCE
Refills: 0 | Status: COMPLETED | OUTPATIENT
Start: 2024-05-26 | End: 2024-05-26

## 2024-05-26 RX ADMIN — Medication 10 MILLIGRAM(S): at 21:22

## 2024-05-26 RX ADMIN — Medication 2: at 08:18

## 2024-05-26 RX ADMIN — PANTOPRAZOLE SODIUM 40 MILLIGRAM(S): 20 TABLET, DELAYED RELEASE ORAL at 06:19

## 2024-05-26 RX ADMIN — Medication 12.5 MILLIGRAM(S): at 18:02

## 2024-05-26 RX ADMIN — Medication 1000 MILLIGRAM(S): at 08:35

## 2024-05-26 RX ADMIN — FAMOTIDINE 10 MILLIGRAM(S): 10 INJECTION INTRAVENOUS at 13:08

## 2024-05-26 RX ADMIN — Medication 12.5 MILLIGRAM(S): at 12:57

## 2024-05-26 RX ADMIN — CHLORHEXIDINE GLUCONATE 1 APPLICATION(S): 213 SOLUTION TOPICAL at 06:20

## 2024-05-26 RX ADMIN — TAMSULOSIN HYDROCHLORIDE 0.4 MILLIGRAM(S): 0.4 CAPSULE ORAL at 21:05

## 2024-05-26 RX ADMIN — Medication 1000 MILLIGRAM(S): at 02:54

## 2024-05-26 RX ADMIN — ATORVASTATIN CALCIUM 20 MILLIGRAM(S): 80 TABLET, FILM COATED ORAL at 21:05

## 2024-05-26 RX ADMIN — Medication 1000 MILLIGRAM(S): at 22:05

## 2024-05-26 RX ADMIN — PANTOPRAZOLE SODIUM 40 MILLIGRAM(S): 20 TABLET, DELAYED RELEASE ORAL at 18:03

## 2024-05-26 RX ADMIN — Medication 2: at 13:03

## 2024-05-26 RX ADMIN — Medication 2: at 23:41

## 2024-05-26 RX ADMIN — POLYETHYLENE GLYCOL 3350 17 GRAM(S): 17 POWDER, FOR SOLUTION ORAL at 12:58

## 2024-05-26 RX ADMIN — Medication 1000 MILLIGRAM(S): at 21:05

## 2024-05-26 RX ADMIN — Medication 1000 MILLIGRAM(S): at 15:55

## 2024-05-26 RX ADMIN — OXYCODONE HYDROCHLORIDE 5 MILLIGRAM(S): 5 TABLET ORAL at 23:46

## 2024-05-26 RX ADMIN — SODIUM CHLORIDE 75 MILLILITER(S): 9 INJECTION, SOLUTION INTRAVENOUS at 19:50

## 2024-05-26 RX ADMIN — LOSARTAN POTASSIUM 100 MILLIGRAM(S): 100 TABLET, FILM COATED ORAL at 06:19

## 2024-05-26 RX ADMIN — SENNA PLUS 2 TABLET(S): 8.6 TABLET ORAL at 21:05

## 2024-05-26 RX ADMIN — Medication 1000 MILLIGRAM(S): at 09:35

## 2024-05-26 RX ADMIN — SODIUM CHLORIDE 75 MILLILITER(S): 9 INJECTION, SOLUTION INTRAVENOUS at 14:55

## 2024-05-26 RX ADMIN — FINASTERIDE 5 MILLIGRAM(S): 5 TABLET, FILM COATED ORAL at 12:58

## 2024-05-26 RX ADMIN — Medication 1000 MILLIGRAM(S): at 16:55

## 2024-05-26 NOTE — PROGRESS NOTE ADULT - ASSESSMENT
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     5/22: Lap R hemicolectomy  5/24: GI scope w/Nextpowder application    Interval events  -   PLAN  NEURO  - Pain: Tylenol, Oxy  - Sedation:     RESP  - Maintain O2 saturation >92%  - AM CXRs    CV  - MAP goal >65  - Pressors: None  - Home Rx: Atorvastatin, lostartan 100qd    GI/NUTRITION  - Diet: CLD  - Pepcid 10qd   - Senna/ Miralax  - GI recs: plan to scope 5/23    RENAL/  - Saenz  - Home Rx: Finasteride, Tamsulosin    HEME  - DVT ppx: None    ID  - ABX: None    ENDO  - Monitor blood glucose     LINES  - PIVs    CODE: FULL  DISPO: SICU    84M PMH HTN, HLD, heart murmur, CAD s/p PCI intervention x 1 (25 years ago) and CABG x 3 (20 years ago - on plavix), GERD, BPH, 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       PLAN  NEURO  - Tylenol and Oxycodone prn pain     RESP: no acute issues   - Maintain O2 saturation >92%  - Encourage IS    CV: h/o HTN, HLD, CAD s/p PCI, CABG on Plavix   - Hemodynamic monitoring, currently stable without vasopressor support   - Continue home dose Losartan and Statin  - Resume Metoprolol 12.5mg BID     GI/NUTRITION: h/o GERD, s/p R hemicolectomy 5/22, complicated by GI bleed s/p scope 5/24 with nextpowder application   - More distended on exam this morning, plan to downgrade diet to NPO with meds from [facility] and monitor closely   - Pepcid 10qd   - Continue Miralax and senna     RENAL/  - Start LR @ 75mL/hr while NPO   - Continue home Finasteride, Tamsulosin  - Replete electrolytes as needed     HEME  - Continue to hold chemical VTE prophylaxis and Plavix as per primary team  - SCDs  - CBC q 12hrs     ID: no acute issues   - ABX: None  - Monitor WBC     ENDO  - ISS q 6hrs     LINES  - PIVs    CODE: FULL  DISPO: SICU

## 2024-05-26 NOTE — PROGRESS NOTE ADULT - SUBJECTIVE AND OBJECTIVE BOX
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     5/22: Lap R hemicolectomy  5/24: GI scope w/Nextpowder application    24 HR Events:  - CLD  - Pepcid 10mg QD  - Bowel regimen   - Labs q12     MEDICATIONS:   --------------------------------------------------------------------------------------  Neurologic Medications  acetaminophen     Tablet .. 1000 milliGRAM(s) Oral every 6 hours  oxyCODONE    IR 2.5 milliGRAM(s) Oral every 4 hours PRN Moderate Pain (4 - 6)  oxyCODONE    IR 5 milliGRAM(s) Oral every 4 hours PRN Severe Pain (7 - 10)    Respiratory Medications    Cardiovascular Medications  losartan 100 milliGRAM(s) Oral daily    Gastrointestinal Medications  famotidine   Suspension 10 milliGRAM(s) Oral daily  lactated ringers. 1000 milliLiter(s) IV Continuous <Continuous>  pantoprazole  Injectable 40 milliGRAM(s) IV Push every 12 hours  polyethylene glycol 3350 17 Gram(s) Oral daily  senna 2 Tablet(s) Oral at bedtime    Genitourinary Medications  tamsulosin 0.4 milliGRAM(s) Oral at bedtime    Hematologic/Oncologic Medications    Antimicrobial/Immunologic Medications    Endocrine/Metabolic Medications  atorvastatin 20 milliGRAM(s) Oral at bedtime  finasteride 5 milliGRAM(s) Oral daily  insulin lispro (ADMELOG) corrective regimen sliding scale   SubCutaneous at bedtime  insulin lispro (ADMELOG) corrective regimen sliding scale   SubCutaneous three times a day before meals    Topical/Other Medications  chlorhexidine 2% Cloths 1 Application(s) Topical <User Schedule>    --------------------------------------------------------------------------------------    VITAL SIGNS, INS/OUTS (last 24 hours):  ICU Vital Signs Last 24 Hrs  T(C): 36.7 (25 May 2024 23:00), Max: 37 (25 May 2024 03:00)  T(F): 98 (25 May 2024 23:00), Max: 98.6 (25 May 2024 03:00)  HR: 77 (25 May 2024 23:00) (64 - 83)  BP: 144/68 (25 May 2024 23:00) (101/57 - 179/79)  BP(mean): 98 (25 May 2024 23:00) (74 - 114)  ABP: --  ABP(mean): --  RR: 20 (25 May 2024 23:00) (12 - 29)  SpO2: 95% (25 May 2024 23:00) (92% - 99%)    O2 Parameters below as of 25 May 2024 19:00  Patient On (Oxygen Delivery Method): room air          --------------------------------------------------------------------------------------    05-24-24 @ 07:01  -  05-25-24 @ 07:00  --------------------------------------------------------  IN: 3605 mL / OUT: 1805 mL / NET: 1800 mL    05-25-24 @ 07:01  -  05-26-24 @ 00:11  --------------------------------------------------------  IN: 1380 mL / OUT: 655 mL / NET: 725 mL      --------------------------------------------------------------------------------------    EXAM  EXAM  NEUROLOGY: Normal, NAD, alert, oriented x3, no focal deficits.  HEENT: Normocephalic, atraumatic, EOMI.   RESPIRATORY: Lungs clear to auscultation, Normal expansion/effort.   -- Mechanical Ventilation:   CARDIOVASCULAR: S1, S2.  Regular rate and rhythm.   GI/NUTRITION: Abdomen soft, Non-tender, Non-distended.   VASCULAR: Extremities warm, pink, well-perfused.  MSK: All extremities moving spontaneously without limitations.  SKIN: Good skin turgor, no skin breakdown.       LABS  --------------------------------------------------------------------------------------    T(C): 36.7 (05-25-24 @ 23:00), Max: 37 (05-25-24 @ 03:00)  HR: 77 (05-25-24 @ 23:00) (64 - 83)  BP: 144/68 (05-25-24 @ 23:00) (101/57 - 179/79)  RR: 20 (05-25-24 @ 23:00) (12 - 29)  SpO2: 95% (05-25-24 @ 23:00) (92% - 99%)    --------------------------------------------------------------------------------------   SICU Daily Progress Note  =====================================================  HPI:   84M PMH HTN, HLD, heart murmur, CAD s/p PCI intervention x 1 (25 years ago) and CABG x 3 (20 years ago - on plavix), GERD, BPH, 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     5/22: Lap R hemicolectomy  5/24: GI scope w/Nextpowder application    24 HR Events:  - CLD  - Pepcid 10mg QD  - Bowel regimen   - Labs q12     MEDICATIONS:   --------------------------------------------------------------------------------------  Neurologic Medications  acetaminophen     Tablet .. 1000 milliGRAM(s) Oral every 6 hours  oxyCODONE    IR 2.5 milliGRAM(s) Oral every 4 hours PRN Moderate Pain (4 - 6)  oxyCODONE    IR 5 milliGRAM(s) Oral every 4 hours PRN Severe Pain (7 - 10)    Respiratory Medications    Cardiovascular Medications  losartan 100 milliGRAM(s) Oral daily    Gastrointestinal Medications  famotidine   Suspension 10 milliGRAM(s) Oral daily  lactated ringers. 1000 milliLiter(s) IV Continuous <Continuous>  pantoprazole  Injectable 40 milliGRAM(s) IV Push every 12 hours  polyethylene glycol 3350 17 Gram(s) Oral daily  senna 2 Tablet(s) Oral at bedtime    Genitourinary Medications  tamsulosin 0.4 milliGRAM(s) Oral at bedtime    Hematologic/Oncologic Medications    Antimicrobial/Immunologic Medications    Endocrine/Metabolic Medications  atorvastatin 20 milliGRAM(s) Oral at bedtime  finasteride 5 milliGRAM(s) Oral daily  insulin lispro (ADMELOG) corrective regimen sliding scale   SubCutaneous at bedtime  insulin lispro (ADMELOG) corrective regimen sliding scale   SubCutaneous three times a day before meals    Topical/Other Medications  chlorhexidine 2% Cloths 1 Application(s) Topical <User Schedule>    --------------------------------------------------------------------------------------    VITAL SIGNS, INS/OUTS (last 24 hours):  ICU Vital Signs Last 24 Hrs  T(C): 36.7 (25 May 2024 23:00), Max: 37 (25 May 2024 03:00)  T(F): 98 (25 May 2024 23:00), Max: 98.6 (25 May 2024 03:00)  HR: 77 (25 May 2024 23:00) (64 - 83)  BP: 144/68 (25 May 2024 23:00) (101/57 - 179/79)  BP(mean): 98 (25 May 2024 23:00) (74 - 114)  RR: 20 (25 May 2024 23:00) (12 - 29)  SpO2: 95% (25 May 2024 23:00) (92% - 99%)    O2 Parameters below as of 25 May 2024 19:00  Patient On (Oxygen Delivery Method): room air      --------------------------------------------------------------------------------------    05-24-24 @ 07:01  -  05-25-24 @ 07:00  --------------------------------------------------------  IN: 3605 mL / OUT: 1805 mL / NET: 1800 mL    05-25-24 @ 07:01  -  05-26-24 @ 00:11  --------------------------------------------------------  IN: 1380 mL / OUT: 655 mL / NET: 725 mL      --------------------------------------------------------------------------------------  EXAM  NEUROLOGY: Normal, NAD, alert, oriented x3, no focal deficits.  HEENT: Normocephalic, atraumatic, EOMI.   RESPIRATORY: Lungs clear to auscultation, Normal expansion/effort.   -- Mechanical Ventilation: N/A  CARDIOVASCULAR: S1, S2.  Regular rate and rhythm.   GI/NUTRITION: Abdomen soft, Non-tender, Non-distended.   VASCULAR: Extremities warm, pink, well-perfused.  MSK: All extremities moving spontaneously without limitations.  SKIN: Good skin turgor, no skin breakdown.       LABS  --------------------------------------------------------------------------------------    T(C): 36.7 (05-25-24 @ 23:00), Max: 37 (05-25-24 @ 03:00)  HR: 77 (05-25-24 @ 23:00) (64 - 83)  BP: 144/68 (05-25-24 @ 23:00) (101/57 - 179/79)  RR: 20 (05-25-24 @ 23:00) (12 - 29)  SpO2: 95% (05-25-24 @ 23:00) (92% - 99%)    --------------------------------------------------------------------------------------

## 2024-05-27 LAB
ANION GAP SERPL CALC-SCNC: 10 MMOL/L — SIGNIFICANT CHANGE UP (ref 5–17)
ANION GAP SERPL CALC-SCNC: 10 MMOL/L — SIGNIFICANT CHANGE UP (ref 5–17)
APTT BLD: 25.9 SEC — SIGNIFICANT CHANGE UP (ref 24.5–35.6)
APTT BLD: 30.2 SEC — SIGNIFICANT CHANGE UP (ref 24.5–35.6)
BASE EXCESS BLDV CALC-SCNC: 1.6 MMOL/L — SIGNIFICANT CHANGE UP (ref -2–3)
BUN SERPL-MCNC: 18 MG/DL — SIGNIFICANT CHANGE UP (ref 7–23)
BUN SERPL-MCNC: 18 MG/DL — SIGNIFICANT CHANGE UP (ref 7–23)
CA-I SERPL-SCNC: 1.22 MMOL/L — SIGNIFICANT CHANGE UP (ref 1.15–1.33)
CALCIUM SERPL-MCNC: 8.2 MG/DL — LOW (ref 8.4–10.5)
CALCIUM SERPL-MCNC: 8.3 MG/DL — LOW (ref 8.4–10.5)
CHLORIDE BLDV-SCNC: 102 MMOL/L — SIGNIFICANT CHANGE UP (ref 96–108)
CHLORIDE SERPL-SCNC: 101 MMOL/L — SIGNIFICANT CHANGE UP (ref 96–108)
CHLORIDE SERPL-SCNC: 99 MMOL/L — SIGNIFICANT CHANGE UP (ref 96–108)
CO2 BLDV-SCNC: 27 MMOL/L — HIGH (ref 22–26)
CO2 SERPL-SCNC: 23 MMOL/L — SIGNIFICANT CHANGE UP (ref 22–31)
CO2 SERPL-SCNC: 23 MMOL/L — SIGNIFICANT CHANGE UP (ref 22–31)
CREAT SERPL-MCNC: 0.99 MG/DL — SIGNIFICANT CHANGE UP (ref 0.5–1.3)
CREAT SERPL-MCNC: 1.14 MG/DL — SIGNIFICANT CHANGE UP (ref 0.5–1.3)
EGFR: 64 ML/MIN/1.73M2 — SIGNIFICANT CHANGE UP
EGFR: 76 ML/MIN/1.73M2 — SIGNIFICANT CHANGE UP
GAS PNL BLDV: 131 MMOL/L — LOW (ref 136–145)
GAS PNL BLDV: SIGNIFICANT CHANGE UP
GLUCOSE BLDC GLUCOMTR-MCNC: 158 MG/DL — HIGH (ref 70–99)
GLUCOSE BLDC GLUCOMTR-MCNC: 159 MG/DL — HIGH (ref 70–99)
GLUCOSE BLDC GLUCOMTR-MCNC: 189 MG/DL — HIGH (ref 70–99)
GLUCOSE BLDC GLUCOMTR-MCNC: 206 MG/DL — HIGH (ref 70–99)
GLUCOSE BLDV-MCNC: 180 MG/DL — HIGH (ref 70–99)
GLUCOSE SERPL-MCNC: 172 MG/DL — HIGH (ref 70–99)
GLUCOSE SERPL-MCNC: 176 MG/DL — HIGH (ref 70–99)
HCO3 BLDV-SCNC: 26 MMOL/L — SIGNIFICANT CHANGE UP (ref 22–29)
HCT VFR BLD CALC: 28.2 % — LOW (ref 39–50)
HCT VFR BLD CALC: 31.1 % — LOW (ref 39–50)
HCT VFR BLDA CALC: 29 % — LOW (ref 39–51)
HGB BLD CALC-MCNC: 9.6 G/DL — LOW (ref 12.6–17.4)
HGB BLD-MCNC: 9.1 G/DL — LOW (ref 13–17)
HGB BLD-MCNC: 9.9 G/DL — LOW (ref 13–17)
HOROWITZ INDEX BLDV+IHG-RTO: 21 — SIGNIFICANT CHANGE UP
INR BLD: 1.08 RATIO — SIGNIFICANT CHANGE UP (ref 0.85–1.18)
INR BLD: 1.09 RATIO — SIGNIFICANT CHANGE UP (ref 0.85–1.18)
LACTATE BLDV-MCNC: 1.1 MMOL/L — SIGNIFICANT CHANGE UP (ref 0.5–2)
MAGNESIUM SERPL-MCNC: 2 MG/DL — SIGNIFICANT CHANGE UP (ref 1.6–2.6)
MAGNESIUM SERPL-MCNC: 2.1 MG/DL — SIGNIFICANT CHANGE UP (ref 1.6–2.6)
MCHC RBC-ENTMCNC: 25.4 PG — LOW (ref 27–34)
MCHC RBC-ENTMCNC: 26.1 PG — LOW (ref 27–34)
MCHC RBC-ENTMCNC: 31.8 GM/DL — LOW (ref 32–36)
MCHC RBC-ENTMCNC: 32.3 GM/DL — SIGNIFICANT CHANGE UP (ref 32–36)
MCV RBC AUTO: 79.9 FL — LOW (ref 80–100)
MCV RBC AUTO: 81 FL — SIGNIFICANT CHANGE UP (ref 80–100)
NRBC # BLD: 0 /100 WBCS — SIGNIFICANT CHANGE UP (ref 0–0)
NRBC # BLD: 0 /100 WBCS — SIGNIFICANT CHANGE UP (ref 0–0)
PCO2 BLDV: 38 MMHG — LOW (ref 42–55)
PH BLDV: 7.44 — HIGH (ref 7.32–7.43)
PHOSPHATE SERPL-MCNC: 2.9 MG/DL — SIGNIFICANT CHANGE UP (ref 2.5–4.5)
PHOSPHATE SERPL-MCNC: 2.9 MG/DL — SIGNIFICANT CHANGE UP (ref 2.5–4.5)
PLATELET # BLD AUTO: 211 K/UL — SIGNIFICANT CHANGE UP (ref 150–400)
PLATELET # BLD AUTO: 218 K/UL — SIGNIFICANT CHANGE UP (ref 150–400)
PO2 BLDV: 67 MMHG — HIGH (ref 25–45)
POTASSIUM BLDV-SCNC: 4.1 MMOL/L — SIGNIFICANT CHANGE UP (ref 3.5–5.1)
POTASSIUM SERPL-MCNC: 4 MMOL/L — SIGNIFICANT CHANGE UP (ref 3.5–5.3)
POTASSIUM SERPL-MCNC: 4.2 MMOL/L — SIGNIFICANT CHANGE UP (ref 3.5–5.3)
POTASSIUM SERPL-SCNC: 4 MMOL/L — SIGNIFICANT CHANGE UP (ref 3.5–5.3)
POTASSIUM SERPL-SCNC: 4.2 MMOL/L — SIGNIFICANT CHANGE UP (ref 3.5–5.3)
PROTHROM AB SERPL-ACNC: 11.4 SEC — SIGNIFICANT CHANGE UP (ref 9.5–13)
PROTHROM AB SERPL-ACNC: 11.9 SEC — SIGNIFICANT CHANGE UP (ref 9.5–13)
RBC # BLD: 3.48 M/UL — LOW (ref 4.2–5.8)
RBC # BLD: 3.89 M/UL — LOW (ref 4.2–5.8)
RBC # FLD: 22 % — HIGH (ref 10.3–14.5)
RBC # FLD: 22.4 % — HIGH (ref 10.3–14.5)
SAO2 % BLDV: 95.6 % — HIGH (ref 67–88)
SODIUM SERPL-SCNC: 132 MMOL/L — LOW (ref 135–145)
SODIUM SERPL-SCNC: 134 MMOL/L — LOW (ref 135–145)
WBC # BLD: 4.84 K/UL — SIGNIFICANT CHANGE UP (ref 3.8–10.5)
WBC # BLD: 5.01 K/UL — SIGNIFICANT CHANGE UP (ref 3.8–10.5)
WBC # FLD AUTO: 4.84 K/UL — SIGNIFICANT CHANGE UP (ref 3.8–10.5)
WBC # FLD AUTO: 5.01 K/UL — SIGNIFICANT CHANGE UP (ref 3.8–10.5)

## 2024-05-27 PROCEDURE — 99024 POSTOP FOLLOW-UP VISIT: CPT

## 2024-05-27 RX ORDER — INSULIN LISPRO 100/ML
VIAL (ML) SUBCUTANEOUS
Refills: 0 | Status: DISCONTINUED | OUTPATIENT
Start: 2024-05-27 | End: 2024-05-31

## 2024-05-27 RX ORDER — SODIUM,POTASSIUM PHOSPHATES 278-250MG
1 POWDER IN PACKET (EA) ORAL ONCE
Refills: 0 | Status: COMPLETED | OUTPATIENT
Start: 2024-05-27 | End: 2024-05-27

## 2024-05-27 RX ORDER — ENOXAPARIN SODIUM 100 MG/ML
40 INJECTION SUBCUTANEOUS EVERY 24 HOURS
Refills: 0 | Status: DISCONTINUED | OUTPATIENT
Start: 2024-05-27 | End: 2024-05-31

## 2024-05-27 RX ADMIN — Medication 2: at 11:40

## 2024-05-27 RX ADMIN — TAMSULOSIN HYDROCHLORIDE 0.4 MILLIGRAM(S): 0.4 CAPSULE ORAL at 21:00

## 2024-05-27 RX ADMIN — SODIUM CHLORIDE 30 MILLILITER(S): 9 INJECTION, SOLUTION INTRAVENOUS at 18:58

## 2024-05-27 RX ADMIN — CHLORHEXIDINE GLUCONATE 1 APPLICATION(S): 213 SOLUTION TOPICAL at 05:46

## 2024-05-27 RX ADMIN — PANTOPRAZOLE SODIUM 40 MILLIGRAM(S): 20 TABLET, DELAYED RELEASE ORAL at 17:06

## 2024-05-27 RX ADMIN — Medication 1 PACKET(S): at 03:51

## 2024-05-27 RX ADMIN — Medication 4: at 16:53

## 2024-05-27 RX ADMIN — Medication 2: at 06:02

## 2024-05-27 RX ADMIN — Medication 1000 MILLIGRAM(S): at 10:50

## 2024-05-27 RX ADMIN — Medication 12.5 MILLIGRAM(S): at 17:07

## 2024-05-27 RX ADMIN — Medication 1000 MILLIGRAM(S): at 15:45

## 2024-05-27 RX ADMIN — FAMOTIDINE 10 MILLIGRAM(S): 10 INJECTION INTRAVENOUS at 15:05

## 2024-05-27 RX ADMIN — OXYCODONE HYDROCHLORIDE 5 MILLIGRAM(S): 5 TABLET ORAL at 00:46

## 2024-05-27 RX ADMIN — ENOXAPARIN SODIUM 40 MILLIGRAM(S): 100 INJECTION SUBCUTANEOUS at 16:52

## 2024-05-27 RX ADMIN — Medication 1000 MILLIGRAM(S): at 10:02

## 2024-05-27 RX ADMIN — Medication 1000 MILLIGRAM(S): at 03:51

## 2024-05-27 RX ADMIN — Medication 1000 MILLIGRAM(S): at 22:00

## 2024-05-27 RX ADMIN — Medication 1000 MILLIGRAM(S): at 04:21

## 2024-05-27 RX ADMIN — Medication 2: at 22:00

## 2024-05-27 RX ADMIN — OXYCODONE HYDROCHLORIDE 5 MILLIGRAM(S): 5 TABLET ORAL at 23:05

## 2024-05-27 RX ADMIN — Medication 1000 MILLIGRAM(S): at 21:00

## 2024-05-27 RX ADMIN — PANTOPRAZOLE SODIUM 40 MILLIGRAM(S): 20 TABLET, DELAYED RELEASE ORAL at 05:46

## 2024-05-27 RX ADMIN — Medication 12.5 MILLIGRAM(S): at 05:46

## 2024-05-27 RX ADMIN — OXYCODONE HYDROCHLORIDE 5 MILLIGRAM(S): 5 TABLET ORAL at 22:05

## 2024-05-27 RX ADMIN — FINASTERIDE 5 MILLIGRAM(S): 5 TABLET, FILM COATED ORAL at 11:39

## 2024-05-27 RX ADMIN — LOSARTAN POTASSIUM 100 MILLIGRAM(S): 100 TABLET, FILM COATED ORAL at 05:47

## 2024-05-27 RX ADMIN — ATORVASTATIN CALCIUM 20 MILLIGRAM(S): 80 TABLET, FILM COATED ORAL at 21:00

## 2024-05-27 RX ADMIN — Medication 1000 MILLIGRAM(S): at 15:04

## 2024-05-27 NOTE — PROGRESS NOTE ADULT - SUBJECTIVE AND OBJECTIVE BOX
Interval events  - NPO  - LR 75  - resumed metoprolol  - f/u primary regarding plavix  - episodes of melena with stable H/H  - given 10 hydralazine IVP for hypertension    SUBJECTIVE/ROS:  [ ] A ten-point review of systems was otherwise negative except as noted.  [ ] Due to altered mental status/intubation, subjective information were not able to be obtained from the patient. History was obtained, to the extent possible, from review of the chart and collateral sources of information.      NEURO  Exam: awake, alert, oriented  Meds: acetaminophen     Tablet .. 1000 milliGRAM(s) Oral every 6 hours  oxyCODONE    IR 2.5 milliGRAM(s) Oral every 4 hours PRN Moderate Pain (4 - 6)  oxyCODONE    IR 5 milliGRAM(s) Oral every 4 hours PRN Severe Pain (7 - 10)  [x] Adequacy of sedation and pain control has been assessed and adjusted      RESPIRATORY  RR: 15 (05-27-24 @ 01:00) (14 - 26)  SpO2: 94% (05-27-24 @ 01:00) (91% - 100%)  Exam: unlabored, clear to auscultation bilaterally      CARDIOVASCULAR  HR: 63 (05-27-24 @ 01:00) (63 - 93)  BP: 110/57 (05-27-24 @ 01:00) (110/57 - 191/87)  BP(mean): 80 (05-27-24 @ 01:00) (80 - 125)  VBG - ( 26 May 2024 05:40 )  pH: 7.42  /  pCO2: 38    /  pO2: 64    / HCO3: 25    / Base Excess: 0.2   /  SaO2: 94.1   Lactate: 0.8      Exam: regular rate and rhythm  Cardiac Rhythm: sinus  Perfusion     [x]Adequate   [ ]Inadequate  Mentation   [x]Normal       [ ]Reduced  Extremities  [x]Warm         [ ]Cool  Volume Status [ ]Hypervolemic [x]Euvolemic [ ]Hypovolemic  Meds: losartan 100 milliGRAM(s) Oral daily  metoprolol tartrate 12.5 milliGRAM(s) Oral every 12 hours      GI/NUTRITION  Exam: soft, nontender, nondistended  Diet: npo  Meds: famotidine   Suspension 10 milliGRAM(s) Oral daily  pantoprazole  Injectable 40 milliGRAM(s) IV Push every 12 hours  polyethylene glycol 3350 17 Gram(s) Oral daily  senna 2 Tablet(s) Oral at bedtime      GENITOURINARY  I&O's Detail    05-25 @ 07:01 - 05-26 @ 07:00  --------------------------------------------------------  IN:    IV PiggyBack: 500 mL    Lactated Ringers: 300 mL    Lactated Ringers: 220 mL    Oral Fluid: 600 mL  Total IN: 1620 mL    OUT:    Indwelling Catheter - Urethral (mL): 255 mL    Voided (mL): 600 mL  Total OUT: 855 mL    Total NET: 765 mL      05-26 @ 07:01 - 05-27 @ 01:51  --------------------------------------------------------  IN:    Lactated Ringers: 1200 mL  Total IN: 1200 mL    OUT:    Voided (mL): 655 mL  Total OUT: 655 mL  Total NET: 545 mL      05-26    134<L>  |  101  |  18  ----------------------------<  176<H>  4.2   |  23  |  0.99    Ca    8.3<L>      26 May 2024 23:43  Phos  2.9     05-26  Mg     2.0     05-26      [ ] Saenz catheter, indication: N/A  Meds: lactated ringers. 1000 milliLiter(s) IV Continuous <Continuous>  potassium phosphate / sodium phosphate Powder (PHOS-NaK) 1 Packet(s) Oral once  tamsulosin 0.4 milliGRAM(s) Oral at bedtime      HEMATOLOGIC  Meds:   [x] VTE Prophylaxis                        9.6    5.46  )-----------( 201      ( 26 May 2024 23:43 )             29.4     PT/INR - ( 26 May 2024 23:43 )   PT: 11.4 sec;   INR: 1.09 ratio         PTT - ( 26 May 2024 23:43 )  PTT:25.9 sec  Transfusion     [ ] PRBC   [ ] Platelets   [ ] FFP   [ ] Cryoprecipitate      INFECTIOUS DISEASES  WBC Count: 5.46 K/uL (05-26 @ 23:43)  WBC Count: 7.68 K/uL (05-26 @ 13:27)  WBC Count: 6.26 K/uL (05-26 @ 05:54)      ENDOCRINE  CAPILLARY BLOOD GLUCOSE  POCT Blood Glucose.: 180 mg/dL (26 May 2024 23:38)  POCT Blood Glucose.: 148 mg/dL (26 May 2024 17:54)  POCT Blood Glucose.: 185 mg/dL (26 May 2024 13:00)  POCT Blood Glucose.: 196 mg/dL (26 May 2024 08:12)    Meds: atorvastatin 20 milliGRAM(s) Oral at bedtime  finasteride 5 milliGRAM(s) Oral daily  insulin lispro (ADMELOG) corrective regimen sliding scale   SubCutaneous every 6 hours      OTHER MEDICATIONS:  chlorhexidine 2% Cloths 1 Application(s) Topical <User Schedule>      CODE STATUS: full code Spine appears normal, range of motion is not limited, no r joint tenderness, right upper thigh generalized tenderness to the muscles, no edema,

## 2024-05-27 NOTE — PROGRESS NOTE ADULT - ASSESSMENT
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     5/22: Lap R hemicolectomy  5/24: GI scope w/Nextpowder application    PLAN  NEURO  - Pain: Tylenol, Oxy  - Sedation: None    RESP  - AM CXRs  - Incentive spirometry    CV  - Pressors: None  - Atorvastatin, lostartan 100 QD, metoprolol 12.5 BID    GI/NUTRITION  - Diet: NPO, except meds  - Pepcid 10qd   - Protonix BID   - Senna/ Miralax  - GI recs: plan to scope 5/23    RENAL/  - LR@75  - Saenz  - Home Rx: Finasteride, Tamsulosin    HEME  - DVT ppx: Holding  - Home Rx: Plavix - Holding    ID  - ABX: None    ENDO  - Monitor blood glucose   - mISS    LINES  - PIVs  - Saenz    CODE: FULL  DISPO: SICU

## 2024-05-27 NOTE — PROGRESS NOTE ADULT - ASSESSMENT
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    Now s/p Lap R hemicolectomy. Tolerated procedure well. Course complicated by lower GI bleed, found to have bleeding at staple line on CTA. Transferred to SICU for hemodynamic monitoring.     Plan  - Monitor H&H > stable   - s/p GI scope at bedside > active bleeding at anastomosis > application Nextpowder, resolution of bleeding   - NPO > possible advance if distension improves   - Appreciate excellent SICU care   - PT no needs     Green Team   [id number].     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    Now s/p Lap R hemicolectomy. Tolerated procedure well. Course complicated by lower GI bleed, found to have bleeding at staple line on CTA. Transferred to SICU for hemodynamic monitoring.     Plan  - can start CLD with ensures today   - melanotic stools noted, likely residual old blood from previous bleeding, h/h has been stable with no evidence of acute bleeding or BRBPR  - needs aggressive PT  - consider hold IVF or reducing as patient appears to be third spaciing fluid  - continue with SICU care     Green Team   [id number].

## 2024-05-27 NOTE — PROGRESS NOTE ADULT - SUBJECTIVE AND OBJECTIVE BOX
General Surgery Daily Resident Progress Note    OVERNIGHT: NAEON.     24 HR Events:  - CLD  - Pepcid 10mg QD  - Bowel regimen   - Labs q12     SUBJECTIVE: Pt seen and examined at bedside.     OBJECTIVE:  Vital Signs Last 24 Hrs  T(C): 36.3 (26 May 2024 22:00), Max: 37.2 (26 May 2024 07:00)  T(F): 97.3 (26 May 2024 22:00), Max: 99 (26 May 2024 07:00)  HR: 63 (27 May 2024 01:00) (63 - 93)  BP: 110/57 (27 May 2024 01:00) (110/57 - 191/87)  BP(mean): 80 (27 May 2024 01:00) (80 - 125)  RR: 15 (27 May 2024 01:00) (14 - 26)  SpO2: 94% (27 May 2024 01:00) (91% - 100%)    Parameters below as of 26 May 2024 19:00  Patient On (Oxygen Delivery Method): room air    PHYSICAL EXAM:  Gen: NAD, A&Ox3  Pulm: No respiratory distress, no subcostal retractions  CV: RRR, no JVD  Abd: Soft, NT, mild distension, incision with mild strikethrough   Extremities: Grossly symmetric       LABS:                        9.6    5.46  )-----------( 201      ( 26 May 2024 23:43 )             29.4     05-26    134<L>  |  101  |  18  ----------------------------<  176<H>  4.2   |  23  |  0.99    Ca    8.3<L>      26 May 2024 23:43  Phos  2.9     05-26  Mg     2.0     05-26      PT/INR - ( 26 May 2024 23:43 )   PT: 11.4 sec;   INR: 1.09 ratio         PTT - ( 26 May 2024 23:43 )  PTT:25.9 sec  Urinalysis Basic - ( 26 May 2024 23:43 )    Color: x / Appearance: x / SG: x / pH: x  Gluc: 176 mg/dL / Ketone: x  / Bili: x / Urobili: x   Blood: x / Protein: x / Nitrite: x   Leuk Esterase: x / RBC: x / WBC x   Sq Epi: x / Non Sq Epi: x / Bacteria: x     General Surgery Daily Resident Progress Note    OVERNIGHT: MAHAD.     24 HR Events:  - back down to NPO for abd distention  - multiple melanotic stools    SUBJECTIVE: Pt seen and examined at bedside. reports to having multiple melanotic bowel movements. Feels better today, less bloated. No n/v/f/c.    OBJECTIVE:  Vital Signs Last 24 Hrs  T(C): 36.3 (26 May 2024 22:00), Max: 37.2 (26 May 2024 07:00)  T(F): 97.3 (26 May 2024 22:00), Max: 99 (26 May 2024 07:00)  HR: 63 (27 May 2024 01:00) (63 - 93)  BP: 110/57 (27 May 2024 01:00) (110/57 - 191/87)  BP(mean): 80 (27 May 2024 01:00) (80 - 125)  RR: 15 (27 May 2024 01:00) (14 - 26)  SpO2: 94% (27 May 2024 01:00) (91% - 100%)    Parameters below as of 26 May 2024 19:00  Patient On (Oxygen Delivery Method): room air    PHYSICAL EXAM:  Gen: NAD, A&Ox3  Pulm: No respiratory distress, no subcostal retractions  CV: RRR, no JVD  Abd: Soft, NT, mild distension, incision with mild strikethrough   Extremities: Grossly symmetric, bilateral pedal edema      LABS:                        9.6    5.46  )-----------( 201      ( 26 May 2024 23:43 )             29.4     05-26    134<L>  |  101  |  18  ----------------------------<  176<H>  4.2   |  23  |  0.99    Ca    8.3<L>      26 May 2024 23:43  Phos  2.9     05-26  Mg     2.0     05-26      PT/INR - ( 26 May 2024 23:43 )   PT: 11.4 sec;   INR: 1.09 ratio         PTT - ( 26 May 2024 23:43 )  PTT:25.9 sec  Urinalysis Basic - ( 26 May 2024 23:43 )    Color: x / Appearance: x / SG: x / pH: x  Gluc: 176 mg/dL / Ketone: x  / Bili: x / Urobili: x   Blood: x / Protein: x / Nitrite: x   Leuk Esterase: x / RBC: x / WBC x   Sq Epi: x / Non Sq Epi: x / Bacteria: x

## 2024-05-28 LAB
ANION GAP SERPL CALC-SCNC: 11 MMOL/L — SIGNIFICANT CHANGE UP (ref 5–17)
APTT BLD: 32 SEC — SIGNIFICANT CHANGE UP (ref 24.5–35.6)
BUN SERPL-MCNC: 17 MG/DL — SIGNIFICANT CHANGE UP (ref 7–23)
CALCIUM SERPL-MCNC: 8.2 MG/DL — LOW (ref 8.4–10.5)
CHLORIDE SERPL-SCNC: 103 MMOL/L — SIGNIFICANT CHANGE UP (ref 96–108)
CO2 SERPL-SCNC: 22 MMOL/L — SIGNIFICANT CHANGE UP (ref 22–31)
CREAT SERPL-MCNC: 1.19 MG/DL — SIGNIFICANT CHANGE UP (ref 0.5–1.3)
EGFR: 61 ML/MIN/1.73M2 — SIGNIFICANT CHANGE UP
GLUCOSE BLDC GLUCOMTR-MCNC: 150 MG/DL — HIGH (ref 70–99)
GLUCOSE BLDC GLUCOMTR-MCNC: 154 MG/DL — HIGH (ref 70–99)
GLUCOSE BLDC GLUCOMTR-MCNC: 159 MG/DL — HIGH (ref 70–99)
GLUCOSE BLDC GLUCOMTR-MCNC: 239 MG/DL — HIGH (ref 70–99)
GLUCOSE SERPL-MCNC: 154 MG/DL — HIGH (ref 70–99)
HCT VFR BLD CALC: 27.4 % — LOW (ref 39–50)
HCT VFR BLD CALC: 27.4 % — LOW (ref 39–50)
HGB BLD-MCNC: 9 G/DL — LOW (ref 13–17)
HGB BLD-MCNC: 9.1 G/DL — LOW (ref 13–17)
INR BLD: 1.07 RATIO — SIGNIFICANT CHANGE UP (ref 0.85–1.18)
MAGNESIUM SERPL-MCNC: 2.1 MG/DL — SIGNIFICANT CHANGE UP (ref 1.6–2.6)
MCHC RBC-ENTMCNC: 26.2 PG — LOW (ref 27–34)
MCHC RBC-ENTMCNC: 26.8 PG — LOW (ref 27–34)
MCHC RBC-ENTMCNC: 32.8 GM/DL — SIGNIFICANT CHANGE UP (ref 32–36)
MCHC RBC-ENTMCNC: 33.2 GM/DL — SIGNIFICANT CHANGE UP (ref 32–36)
MCV RBC AUTO: 79.9 FL — LOW (ref 80–100)
MCV RBC AUTO: 80.8 FL — SIGNIFICANT CHANGE UP (ref 80–100)
NRBC # BLD: 0 /100 WBCS — SIGNIFICANT CHANGE UP (ref 0–0)
NRBC # BLD: 0 /100 WBCS — SIGNIFICANT CHANGE UP (ref 0–0)
PHOSPHATE SERPL-MCNC: 3.7 MG/DL — SIGNIFICANT CHANGE UP (ref 2.5–4.5)
PLATELET # BLD AUTO: 215 K/UL — SIGNIFICANT CHANGE UP (ref 150–400)
PLATELET # BLD AUTO: 226 K/UL — SIGNIFICANT CHANGE UP (ref 150–400)
POTASSIUM SERPL-MCNC: 4 MMOL/L — SIGNIFICANT CHANGE UP (ref 3.5–5.3)
POTASSIUM SERPL-SCNC: 4 MMOL/L — SIGNIFICANT CHANGE UP (ref 3.5–5.3)
PROTHROM AB SERPL-ACNC: 11.2 SEC — SIGNIFICANT CHANGE UP (ref 9.5–13)
RBC # BLD: 3.39 M/UL — LOW (ref 4.2–5.8)
RBC # BLD: 3.43 M/UL — LOW (ref 4.2–5.8)
RBC # FLD: 22.2 % — HIGH (ref 10.3–14.5)
RBC # FLD: 22.2 % — HIGH (ref 10.3–14.5)
SODIUM SERPL-SCNC: 136 MMOL/L — SIGNIFICANT CHANGE UP (ref 135–145)
WBC # BLD: 3.73 K/UL — LOW (ref 3.8–10.5)
WBC # BLD: 4.1 K/UL — SIGNIFICANT CHANGE UP (ref 3.8–10.5)
WBC # FLD AUTO: 3.73 K/UL — LOW (ref 3.8–10.5)
WBC # FLD AUTO: 4.1 K/UL — SIGNIFICANT CHANGE UP (ref 3.8–10.5)

## 2024-05-28 RX ADMIN — Medication 1000 MILLIGRAM(S): at 15:40

## 2024-05-28 RX ADMIN — FAMOTIDINE 10 MILLIGRAM(S): 10 INJECTION INTRAVENOUS at 15:40

## 2024-05-28 RX ADMIN — Medication 1000 MILLIGRAM(S): at 08:48

## 2024-05-28 RX ADMIN — ENOXAPARIN SODIUM 40 MILLIGRAM(S): 100 INJECTION SUBCUTANEOUS at 17:36

## 2024-05-28 RX ADMIN — FINASTERIDE 5 MILLIGRAM(S): 5 TABLET, FILM COATED ORAL at 11:45

## 2024-05-28 RX ADMIN — ATORVASTATIN CALCIUM 20 MILLIGRAM(S): 80 TABLET, FILM COATED ORAL at 21:06

## 2024-05-28 RX ADMIN — SENNA PLUS 2 TABLET(S): 8.6 TABLET ORAL at 21:06

## 2024-05-28 RX ADMIN — Medication 4: at 11:43

## 2024-05-28 RX ADMIN — Medication 12.5 MILLIGRAM(S): at 17:36

## 2024-05-28 RX ADMIN — Medication 255 MILLIMOLE(S): at 05:20

## 2024-05-28 RX ADMIN — Medication 1000 MILLIGRAM(S): at 09:30

## 2024-05-28 RX ADMIN — Medication 1000 MILLIGRAM(S): at 21:06

## 2024-05-28 RX ADMIN — CHLORHEXIDINE GLUCONATE 1 APPLICATION(S): 213 SOLUTION TOPICAL at 05:21

## 2024-05-28 RX ADMIN — Medication 1000 MILLIGRAM(S): at 22:06

## 2024-05-28 RX ADMIN — Medication 1000 MILLIGRAM(S): at 16:30

## 2024-05-28 RX ADMIN — POLYETHYLENE GLYCOL 3350 17 GRAM(S): 17 POWDER, FOR SOLUTION ORAL at 11:46

## 2024-05-28 RX ADMIN — TAMSULOSIN HYDROCHLORIDE 0.4 MILLIGRAM(S): 0.4 CAPSULE ORAL at 21:06

## 2024-05-28 RX ADMIN — Medication 2: at 21:06

## 2024-05-28 RX ADMIN — Medication 12.5 MILLIGRAM(S): at 05:20

## 2024-05-28 RX ADMIN — Medication 2: at 15:43

## 2024-05-28 RX ADMIN — LOSARTAN POTASSIUM 100 MILLIGRAM(S): 100 TABLET, FILM COATED ORAL at 05:20

## 2024-05-28 RX ADMIN — PANTOPRAZOLE SODIUM 40 MILLIGRAM(S): 20 TABLET, DELAYED RELEASE ORAL at 05:21

## 2024-05-28 RX ADMIN — PANTOPRAZOLE SODIUM 40 MILLIGRAM(S): 20 TABLET, DELAYED RELEASE ORAL at 17:37

## 2024-05-28 NOTE — PROGRESS NOTE ADULT - SUBJECTIVE AND OBJECTIVE BOX
General Surgery Daily Resident Progress Note    OVERNIGHT: MAHAD.     SUBJECTIVE: Pt seen and examined at bedside.     OBJECTIVE:  Vital Signs Last 24 Hrs  T(C): 36.7 (27 May 2024 22:00), Max: 36.8 (27 May 2024 08:00)  T(F): 98.1 (27 May 2024 22:00), Max: 98.2 (27 May 2024 08:00)  HR: 69 (28 May 2024 00:00) (57 - 81)  BP: 120/59 (28 May 2024 00:00) (112/55 - 171/73)  BP(mean): 85 (28 May 2024 00:00) (78 - 105)  RR: 18 (28 May 2024 00:00) (11 - 26)  SpO2: 96% (28 May 2024 00:00) (94% - 99%)    Parameters below as of 27 May 2024 19:00  Patient On (Oxygen Delivery Method): room air      PHYSICAL EXAM:  Gen: NAD, A&Ox3  Pulm: No respiratory distress, no subcostal retractions  CV: RRR, no JVD  Abd: Soft, NT, mild distension, incision with mild strikethrough   Extremities: Grossly symmetric, bilateral pedal edema    LABS:                        9.1    4.84  )-----------( 211      ( 27 May 2024 22:04 )             28.2     05-27    132<L>  |  99  |  18  ----------------------------<  172<H>  4.0   |  23  |  1.14    Ca    8.2<L>      27 May 2024 22:04  Phos  2.9     05-27  Mg     2.1     05-27      PT/INR - ( 27 May 2024 22:04 )   PT: 11.9 sec;   INR: 1.08 ratio         PTT - ( 27 May 2024 22:04 )  PTT:30.2 sec  Urinalysis Basic - ( 27 May 2024 22:04 )    Color: x / Appearance: x / SG: x / pH: x  Gluc: 172 mg/dL / Ketone: x  / Bili: x / Urobili: x   Blood: x / Protein: x / Nitrite: x   Leuk Esterase: x / RBC: x / WBC x   Sq Epi: x / Non Sq Epi: x / Bacteria: x

## 2024-05-28 NOTE — PROGRESS NOTE ADULT - ASSESSMENT
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     5/22: Lap R hemicolectomy  5/24: GI scope w/Nextpowder application    PLAN  NEURO  - Pain: Tylenol, Oxy  - Sedation: None    RESP  - AM CXRs  - Incentive spirometry    CV  - Pressors: None  - Atorvastatin, lostartan 100 QD, metoprolol 12.5 BID    GI/NUTRITION  - Diet: CLD  - Pepcid 10qd   - Protonix BID   - Senna/ Miralax    RENAL/  - LR@75  - Saenz  - Home Rx: Finasteride, Tamsulosin    HEME  - DVT ppx: lvx  - Home Rx: Plavix - Holding    ID  - ABX: None    ENDO  - Monitor blood glucose   - mISS    LINES  - PIVs  - Saenz    CODE: FULL  DISPO: SICU

## 2024-05-28 NOTE — PROGRESS NOTE ADULT - ASSESSMENT
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    Now s/p Lap R hemicolectomy. Tolerated procedure well. Course complicated by lower GI bleed, found to have bleeding at staple line on CTA. Transferred to SICU for hemodynamic monitoring.     Plan  - Restarted DVT prophylaxis > f/u AM H&H  - CLD w ensures   - melanotic stools noted, likely residual old blood from previous bleeding, h/h has been stable with no evidence of acute bleeding or BRBPR  - needs aggressive PT  - consider hold IVF or reducing as patient appears to be third spaciing fluid  - Appreciate SICU care      Green Team   [id number].   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    Now s/p Lap R hemicolectomy. Tolerated procedure well. Course complicated by lower GI bleed, found to have bleeding at staple line on CTA. Transferred to SICU for hemodynamic monitoring.     Plan  - Restarted DVT prophylaxis > f/u AM H&H  - CLD w ensures   - melanotic stools noted, likely residual old blood from previous bleeding, h/h has been stable with no evidence of acute bleeding or BRBPR  - needs aggressive PT  - consider hold IVF or reducing as patient appears to be third spacing fluid  - Appreciate SICU care      Green Team   [id number].

## 2024-05-28 NOTE — PROGRESS NOTE ADULT - SUBJECTIVE AND OBJECTIVE BOX
24 HOUR EVENTS:  H/h stable, started on LVX for dvt ppx    SUBJECTIVE/ROS:  [x] A ten-point review of systems was otherwise negative except as noted.  [ ] Due to altered mental status/intubation, subjective information were not able to be obtained from the patient. History was obtained, to the extent possible, from review of the chart and collateral sources of information.      NEURO  RASS:     GCS:     CAM ICU:  Exam: awake, alert, oriented  Meds: acetaminophen     Tablet .. 1000 milliGRAM(s) Oral every 6 hours  oxyCODONE    IR 2.5 milliGRAM(s) Oral every 4 hours PRN Moderate Pain (4 - 6)  oxyCODONE    IR 5 milliGRAM(s) Oral every 4 hours PRN Severe Pain (7 - 10)    [x] Adequacy of sedation and pain control has been assessed and adjusted      RESPIRATORY  RR: 23 (05-27-24 @ 22:00) (11 - 26)  SpO2: 96% (05-27-24 @ 22:00) (94% - 99%)  Wt(kg): --  Exam: unlabored, clear to auscultation bilaterally  Mechanical Ventilation:     [N/A] Extubation Readiness Assessed  Meds:       CARDIOVASCULAR  HR: 74 (05-27-24 @ 22:00) (57 - 81)  BP: 167/71 (05-27-24 @ 22:00) (110/57 - 171/73)  BP(mean): 102 (05-27-24 @ 22:00) (78 - 105)  ABP: --  ABP(mean): --  Wt(kg): --  CVP(cm H2O): --  VBG - ( 27 May 2024 21:55 )  pH: 7.44  /  pCO2: 38    /  pO2: 67    / HCO3: 26    / Base Excess: 1.6   /  SaO2: 95.6   Lactate: 1.1                Exam: regular rate and rhythm  Cardiac Rhythm: sinus  Perfusion     [x]Adequate   [ ]Inadequate  Mentation   [x]Normal       [ ]Reduced  Extremities  [x]Warm         [ ]Cool  Volume Status [ ]Hypervolemic [x]Euvolemic [ ]Hypovolemic  Meds: losartan 100 milliGRAM(s) Oral daily  metoprolol tartrate 12.5 milliGRAM(s) Oral every 12 hours        GI/NUTRITION  Exam: soft, nontender, nondistended, incision C/D/I  Diet:  Meds: famotidine   Suspension 10 milliGRAM(s) Oral daily  pantoprazole  Injectable 40 milliGRAM(s) IV Push every 12 hours  polyethylene glycol 3350 17 Gram(s) Oral daily  senna 2 Tablet(s) Oral at bedtime      GENITOURINARY  I&O's Detail    05-26 @ 07:01  -  05-27 @ 07:00  --------------------------------------------------------  IN:    Lactated Ringers: 1650 mL  Total IN: 1650 mL    OUT:    Voided (mL): 855 mL  Total OUT: 855 mL    Total NET: 795 mL      05-27 @ 07:01 - 05-28 @ 00:08  --------------------------------------------------------  IN:    Lactated Ringers: 555 mL    Oral Fluid: 540 mL  Total IN: 1095 mL    OUT:    Voided (mL): 580 mL  Total OUT: 580 mL    Total NET: 515 mL          05-27    132<L>  |  99  |  18  ----------------------------<  172<H>  4.0   |  23  |  1.14    Ca    8.2<L>      27 May 2024 22:04  Phos  2.9     05-27  Mg     2.1     05-27      [ ] Saenz catheter, indication: N/A  Meds: lactated ringers. 1000 milliLiter(s) IV Continuous <Continuous>  tamsulosin 0.4 milliGRAM(s) Oral at bedtime        HEMATOLOGIC  Meds: enoxaparin Injectable 40 milliGRAM(s) SubCutaneous every 24 hours    [x] VTE Prophylaxis                        9.1    4.84  )-----------( 211      ( 27 May 2024 22:04 )             28.2     PT/INR - ( 27 May 2024 22:04 )   PT: 11.9 sec;   INR: 1.08 ratio         PTT - ( 27 May 2024 22:04 )  PTT:30.2 sec  Transfusion     [ ] PRBC   [ ] Platelets   [ ] FFP   [ ] Cryoprecipitate      INFECTIOUS DISEASES  WBC Count: 4.84 K/uL (05-27 @ 22:04)  WBC Count: 5.01 K/uL (05-27 @ 12:17)    RECENT CULTURES:    Meds:       ENDOCRINE  CAPILLARY BLOOD GLUCOSE      POCT Blood Glucose.: 159 mg/dL (27 May 2024 21:58)  POCT Blood Glucose.: 206 mg/dL (27 May 2024 16:31)  POCT Blood Glucose.: 189 mg/dL (27 May 2024 11:37)  POCT Blood Glucose.: 158 mg/dL (27 May 2024 06:01)    Meds: atorvastatin 20 milliGRAM(s) Oral at bedtime  finasteride 5 milliGRAM(s) Oral daily  insulin lispro (ADMELOG) corrective regimen sliding scale   SubCutaneous Before meals and at bedtime        ACCESS DEVICES:  [ ] Peripheral IV  [ ] Central Venous Line	[ ] R	[ ] L	[ ] IJ	[ ] Fem	[ ] SC	Placed:   [ ] Arterial Line		[ ] R	[ ] L	[ ] Fem	[ ] Rad	[ ] Ax	Placed:   [ ] PICC:					[ ] Mediport  [ ] Urinary Catheter, Date Placed:   [x] Necessity of urinary, arterial, and venous catheters discussed    OTHER MEDICATIONS:  chlorhexidine 2% Cloths 1 Application(s) Topical <User Schedule>      CODE STATUS:      IMAGING:

## 2024-05-29 ENCOUNTER — TRANSCRIPTION ENCOUNTER (OUTPATIENT)
Age: 84
End: 2024-05-29

## 2024-05-29 LAB
ANION GAP SERPL CALC-SCNC: 13 MMOL/L — SIGNIFICANT CHANGE UP (ref 5–17)
APTT BLD: 33.2 SEC — SIGNIFICANT CHANGE UP (ref 24.5–35.6)
BUN SERPL-MCNC: 15 MG/DL — SIGNIFICANT CHANGE UP (ref 7–23)
CALCIUM SERPL-MCNC: 8 MG/DL — LOW (ref 8.4–10.5)
CHLORIDE SERPL-SCNC: 102 MMOL/L — SIGNIFICANT CHANGE UP (ref 96–108)
CO2 SERPL-SCNC: 23 MMOL/L — SIGNIFICANT CHANGE UP (ref 22–31)
CREAT SERPL-MCNC: 1.13 MG/DL — SIGNIFICANT CHANGE UP (ref 0.5–1.3)
EGFR: 65 ML/MIN/1.73M2 — SIGNIFICANT CHANGE UP
GLUCOSE BLDC GLUCOMTR-MCNC: 125 MG/DL — HIGH (ref 70–99)
GLUCOSE BLDC GLUCOMTR-MCNC: 163 MG/DL — HIGH (ref 70–99)
GLUCOSE BLDC GLUCOMTR-MCNC: 180 MG/DL — HIGH (ref 70–99)
GLUCOSE BLDC GLUCOMTR-MCNC: 193 MG/DL — HIGH (ref 70–99)
GLUCOSE BLDC GLUCOMTR-MCNC: 195 MG/DL — HIGH (ref 70–99)
GLUCOSE SERPL-MCNC: 124 MG/DL — HIGH (ref 70–99)
HCT VFR BLD CALC: 27.4 % — LOW (ref 39–50)
HGB BLD-MCNC: 8.8 G/DL — LOW (ref 13–17)
INR BLD: 1.04 RATIO — SIGNIFICANT CHANGE UP (ref 0.85–1.18)
MAGNESIUM SERPL-MCNC: 2.1 MG/DL — SIGNIFICANT CHANGE UP (ref 1.6–2.6)
MCHC RBC-ENTMCNC: 26.3 PG — LOW (ref 27–34)
MCHC RBC-ENTMCNC: 32.1 GM/DL — SIGNIFICANT CHANGE UP (ref 32–36)
MCV RBC AUTO: 82 FL — SIGNIFICANT CHANGE UP (ref 80–100)
NRBC # BLD: 0 /100 WBCS — SIGNIFICANT CHANGE UP (ref 0–0)
PHOSPHATE SERPL-MCNC: 3 MG/DL — SIGNIFICANT CHANGE UP (ref 2.5–4.5)
PLATELET # BLD AUTO: 218 K/UL — SIGNIFICANT CHANGE UP (ref 150–400)
POTASSIUM SERPL-MCNC: 3.9 MMOL/L — SIGNIFICANT CHANGE UP (ref 3.5–5.3)
POTASSIUM SERPL-SCNC: 3.9 MMOL/L — SIGNIFICANT CHANGE UP (ref 3.5–5.3)
PROTHROM AB SERPL-ACNC: 11.4 SEC — SIGNIFICANT CHANGE UP (ref 9.5–13)
RBC # BLD: 3.34 M/UL — LOW (ref 4.2–5.8)
RBC # FLD: 22.6 % — HIGH (ref 10.3–14.5)
SODIUM SERPL-SCNC: 138 MMOL/L — SIGNIFICANT CHANGE UP (ref 135–145)
WBC # BLD: 2.45 K/UL — LOW (ref 3.8–10.5)
WBC # FLD AUTO: 2.45 K/UL — LOW (ref 3.8–10.5)

## 2024-05-29 RX ADMIN — ENOXAPARIN SODIUM 40 MILLIGRAM(S): 100 INJECTION SUBCUTANEOUS at 17:59

## 2024-05-29 RX ADMIN — Medication 1000 MILLIGRAM(S): at 03:43

## 2024-05-29 RX ADMIN — FINASTERIDE 5 MILLIGRAM(S): 5 TABLET, FILM COATED ORAL at 12:29

## 2024-05-29 RX ADMIN — LOSARTAN POTASSIUM 100 MILLIGRAM(S): 100 TABLET, FILM COATED ORAL at 05:34

## 2024-05-29 RX ADMIN — CHLORHEXIDINE GLUCONATE 1 APPLICATION(S): 213 SOLUTION TOPICAL at 05:37

## 2024-05-29 RX ADMIN — Medication 1000 MILLIGRAM(S): at 22:24

## 2024-05-29 RX ADMIN — Medication 12.5 MILLIGRAM(S): at 17:59

## 2024-05-29 RX ADMIN — Medication 1000 MILLIGRAM(S): at 03:13

## 2024-05-29 RX ADMIN — FAMOTIDINE 10 MILLIGRAM(S): 10 INJECTION INTRAVENOUS at 16:10

## 2024-05-29 RX ADMIN — Medication 2: at 13:44

## 2024-05-29 RX ADMIN — TAMSULOSIN HYDROCHLORIDE 0.4 MILLIGRAM(S): 0.4 CAPSULE ORAL at 21:53

## 2024-05-29 RX ADMIN — ATORVASTATIN CALCIUM 20 MILLIGRAM(S): 80 TABLET, FILM COATED ORAL at 21:53

## 2024-05-29 RX ADMIN — Medication 1000 MILLIGRAM(S): at 21:54

## 2024-05-29 RX ADMIN — PANTOPRAZOLE SODIUM 40 MILLIGRAM(S): 20 TABLET, DELAYED RELEASE ORAL at 05:35

## 2024-05-29 RX ADMIN — Medication 2: at 18:00

## 2024-05-29 RX ADMIN — PANTOPRAZOLE SODIUM 40 MILLIGRAM(S): 20 TABLET, DELAYED RELEASE ORAL at 17:59

## 2024-05-29 RX ADMIN — Medication 12.5 MILLIGRAM(S): at 05:34

## 2024-05-29 RX ADMIN — Medication 2: at 23:09

## 2024-05-29 NOTE — DISCHARGE NOTE PROVIDER - HOSPITAL COURSE
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 Presents to [facility] prior to scheduled right hemicolectomy on 5/22/24 with Dr. Das. Patient reports anemia and unintentional weight loss of 10 lbs over the past year. Endorses fatigability. Denies chest pain, palpitations, sob/blankenship, dizziness, syncope.  On 5/22 pt underwent Lap right hemicolectomy. He tolerated the procedure well, was extubated and sent to PACU in stable condition. The patient was hemodynamically stable and was transferred to a surgical floor.  POD 1 he had hematochezia, GI and SICU were consulted.  Colonoscopy performed 5/23 which showed Blood and clot in the entire examined colon. Active bleeding noted at the anastamosis. NexPowder applied into actively pooling fresh red blood.  He received PRBC's for acute blood loss anemia.  He remained clinically stable and was transferred out of SICU.  His pain was controlled by IV pain medications and then by PO pain medications. The patient was advanced from clears to regular diet and tolerated it well.  The patient is ambulating, voiding, tolerating a diet, and pain is controlled with oral pain medications.  Patient is stable for discharge home. 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 Presents to [facility] prior to scheduled right hemicolectomy on 5/22/24 with Dr. Das. Patient reports anemia and unintentional weight loss of 10 lbs over the past year. Endorses fatigability. Denies chest pain, palpitations, sob/blankenship, dizziness, syncope.  On 5/22 pt underwent Lap right hemicolectomy. He tolerated the procedure well, was extubated and sent to PACU in stable condition. The patient was hemodynamically stable and was transferred to a surgical floor.  POD 1 he had hematochezia, GI and SICU were consulted.  Colonoscopy performed 5/23 which showed Blood and clot in the entire examined colon. Active bleeding noted at the anastamosis. NexPowder applied into actively pooling fresh red blood.  He received PRBC's for acute blood loss anemia.  He remained clinically stable and was transferred out of SICU.  His pain was controlled by IV pain medications and then by PO pain medications. The patient was advanced from clears to regular diet and tolerated it well.  Bloody BM's resolved, H/H remained stable.  Pt's WBC decreased but differential showed no cause for concern. The patient is ambulating, voiding, tolerating a diet, and pain is controlled with oral pain medications.  Patient is stable for discharge home. 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 Presents to [facility] prior to scheduled right hemicolectomy on 5/22/24 with Dr. Das. Patient reports anemia and unintentional weight loss of 10 lbs over the past year. Endorses fatigability. Denies chest pain, palpitations, sob/blankenship, dizziness, syncope.  On 5/22 pt underwent Lap right hemicolectomy. He tolerated the procedure well, was extubated and sent to PACU in stable condition. The patient was hemodynamically stable and was transferred to a surgical floor.  POD 1 he had hematochezia, GI and SICU were consulted.  Colonoscopy performed 5/23 which showed Blood and clot in the entire examined colon. Active bleeding noted at the anastamosis. NexPowder applied into actively pooling fresh red blood.  He received PRBC's for acute blood loss anemia.  He remained clinically stable and was transferred out of SICU.  His pain was controlled by IV pain medications and then by PO pain medications. The patient was advanced from clears to regular diet and tolerated it well.  Bloody BM's resolved, H/H remained stable.  Pt's WBC decreased but differential showed no cause for concern. The patient is ambulating, voiding, tolerating a diet, and pain is controlled with oral pain medications.  Patient is stable for discharge home and will follow-up with Dr. Das within 1-2 weeks.

## 2024-05-29 NOTE — CHART NOTE - NSCHARTNOTEFT_GEN_A_CORE
2 episodes or BRBPR this afternoon  vss  abd benign  h/h stable  - tx prbcs  -f/u h/h  -replace ujarez  -sicu eval  -if cont to bleed- check CTA to eval for anastamotic bleed vs other source
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    Now s/p Lap R hemicolectomy. Tolerated procedure well. Course complicated by lower GI bleed, found to have bleeding at staple line on CTA. Transferred to SICU for hemodynamic monitoring. H&H stable. Tolerating diet. Restarted on DVT prophylaxis.       Plan   Had episode of Megha earlier yesterday > H&H stable > f/u AM H&H  Regular diet > tolerating   f/u plan for resuming plavix       [facility]   [id number]
Brief GI update note:  84 year old male with HTN, HLD, heart murmur, CAD s/p PCI (25 years ago) and CABG x 3 (20 years ago - on plavix), GERD, BPH, hospitalized 8/2023 for babesiosis infection which was treated, colon polyps, most recent colonoscopy on 5/7/24 with adenocarcinoma at the ascending colon s/p R hemicolectomy 5/22 s/p colonoscopy 5/24 w/ large clotted blood and bleed at anastomotic site. Clot obscured the bleed. Nexpowder was sprayed. Hb has remained stable. GI will sign off at this time. Please call with questions.    Dilia Schmid  GI/Hep fellow
SURGERY POST OP CHECK    STATUS POST PROCEDURE:    SUBJECTIVE: Pt seen and examined without complaints. Pain is controlled. Tolerating CLD. Ambulating well. Denies CP/SOB/N/V.         OBJECTIVE:  Vital Signs Last 24 Hrs  T(C): 36.2 (22 May 2024 18:30), Max: 36.8 (22 May 2024 08:21)  T(F): 97.2 (22 May 2024 18:30), Max: 98.2 (22 May 2024 08:21)  HR: 72 (22 May 2024 19:02) (48 - 72)  BP: 135/72 (22 May 2024 19:02) (117/87 - 149/73)  BP(mean): 98 (22 May 2024 13:45) (98 - 98)  RR: 18 (22 May 2024 19:02) (14 - 18)  SpO2: 99% (22 May 2024 19:02) (99% - 100%)    Parameters below as of 22 May 2024 19:02  Patient On (Oxygen Delivery Method): room air      I&O's Summary    22 May 2024 07:01  -  22 May 2024 19:10  --------------------------------------------------------  IN: 475 mL / OUT: 115 mL / NET: 360 mL        PHYSICAL EXAM:  Gen: NAD, A&Ox3  Pulm: No respiratory distress, no subcostal retractions  CV: RRR, no JVD  Abd: Soft, NT, mild distension, incision with mild strikethrough   Extremities: Grossly symmetric     ASSESSMENT/PLAN: HPI:  84 year old male with PMH HTN, HLD, heart murmur, CAD s/p PCI intervention x 1 (25 years ago) and CABG x 3 (20 years ago - on plavix), GERD, BPH, 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    Now s/p Lap R hemicolectomy. Tolerated procedure well.     Plan    - ERP Protocol  - Juarez removed POD #1 unless contraindicated then POD #2 ; TOV at after juarez removal at 6 hrs   - Diet: CLD  - Monitor bowel function   - OOB as tolerated  - Incentive spirometry  - DVT prophylaxis: SubQ Heparin    Green Team   [id number]
no pain  vss  abd distended, nt  no further rectal bleeding, h/h stable  npo til less distended

## 2024-05-29 NOTE — DISCHARGE NOTE PROVIDER - CARE PROVIDER_API CALL
Malcolm Das  Colon/Rectal Surgery  [address], [address]  [place] [address]  Phone: [phone]  Fax: [phone]  Follow Up Time: 1 week

## 2024-05-29 NOTE — DISCHARGE NOTE PROVIDER - NSDCMRMEDTOKEN_GEN_ALL_CORE_FT
Bacid oral capsule: 1 cap(s) orally once a day  Calcium w/ Vit D3: 1 tab daily  clopidogrel 75 mg oral tablet: 1 tab(s) orally once a day  ferrous sulfate (as elemental iron) 45 mg oral tablet, extended release: 1 tab(s) orally once a day  finasteride 5 mg oral tablet: 1 tab(s) orally once a day  Fish Oil: 1 tablet by mouth daily  glipiZIDE 5 mg oral tablet: 1 tab(s) orally 2 times a day  losartan 100 mg oral tablet: 1 tab(s) orally once a day  metFORMIN 1000 mg oral tablet: 1 tab(s) orally 2 times a day  metoprolol succinate 25 mg oral tablet, extended release: 1 tab(s) orally once a day (at bedtime)  Multiple Vitamins oral capsule: 1 cap(s) orally once a day  Pepcid AC 10 mg oral tablet: 1 tab(s) orally once a day  rosuvastatin 5 mg oral tablet: 1 tab(s) orally once a day (at bedtime)  tamsulosin 0.4 mg oral capsule: 1 cap(s) orally once a day (at bedtime)  Vitamin B12: 1 tablet  by mouth daily   acetaminophen 500 mg oral tablet: 2 tab(s) orally every 6 hours  Bacid oral capsule: 1 cap(s) orally once a day  Calcium w/ Vit D3: 1 tab daily  clopidogrel 75 mg oral tablet: 1 tab(s) orally once a day  finasteride 5 mg oral tablet: 1 tab(s) orally once a day  Fish Oil: 1 tablet by mouth daily  glipiZIDE 5 mg oral tablet: 1 tab(s) orally 2 times a day  losartan 100 mg oral tablet: 1 tab(s) orally once a day  metFORMIN 1000 mg oral tablet: 1 tab(s) orally 2 times a day  metoprolol succinate 25 mg oral tablet, extended release: 1 tab(s) orally once a day (at bedtime)  Multiple Vitamins oral capsule: 1 cap(s) orally once a day  Pepcid AC 10 mg oral tablet: 1 tab(s) orally once a day  polyethylene glycol 3350 oral powder for reconstitution: 17 gram(s) orally once a day  rosuvastatin 5 mg oral tablet: 1 tab(s) orally once a day (at bedtime)  senna leaf extract oral tablet: 2 tab(s) orally once a day (at bedtime)  tamsulosin 0.4 mg oral capsule: 1 cap(s) orally once a day (at bedtime)  Vitamin B12: 1 tablet  by mouth daily   acetaminophen 500 mg oral tablet: 2 tab(s) orally every 6 hours  Bacid oral capsule: 1 cap(s) orally once a day  Calcium w/ Vit D3: 1 tab daily  finasteride 5 mg oral tablet: 1 tab(s) orally once a day  Fish Oil: 1 tablet by mouth daily  glipiZIDE 5 mg oral tablet: 1 tab(s) orally 2 times a day  losartan 100 mg oral tablet: 1 tab(s) orally once a day  metFORMIN 1000 mg oral tablet: 1 tab(s) orally 2 times a day  metoprolol succinate 25 mg oral tablet, extended release: 1 tab(s) orally once a day (at bedtime)  Multiple Vitamins oral capsule: 1 cap(s) orally once a day  Pepcid AC 10 mg oral tablet: 1 tab(s) orally once a day  polyethylene glycol 3350 oral powder for reconstitution: 17 gram(s) orally once a day  rosuvastatin 5 mg oral tablet: 1 tab(s) orally once a day (at bedtime)  senna leaf extract oral tablet: 2 tab(s) orally once a day (at bedtime)  tamsulosin 0.4 mg oral capsule: 1 cap(s) orally once a day (at bedtime)  Vitamin B12: 1 tablet  by mouth daily

## 2024-05-29 NOTE — PROGRESS NOTE ADULT - ASSESSMENT
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    Now s/p Lap R hemicolectomy. Tolerated procedure well. Course complicated by lower GI bleed, found to have bleeding at staple line on CTA. Transferred to SICU for hemodynamic monitoring.     Plan  - f/u AM H&H  - Regular   - melanotic stools noted yesterday, likely residual old blood from previous bleeding, h/h has been stable with no evidence of acute bleeding or BRBPR  - needs aggressive PT  - Appreciate SICU care      Green Team   [id number]. 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    Now s/p Lap R hemicolectomy. Tolerated procedure well. Course complicated by lower GI bleed, found to have bleeding at staple line on CTA. Transferred to SICU for hemodynamic monitoring.   5/29- Melena     Plan  - AM labs reviewed hgb 8.8 from 9.1 o/n  - Monitor for s/s anemia  - Regular   - melanotic stools noted yesterday, likely residual old blood from previous bleeding, h/h has been stable with no evidence of acute bleeding or BRBPR  - needs aggressive PT  - Appreciate SICU care      Green Team   [id number].

## 2024-05-29 NOTE — DISCHARGE NOTE PROVIDER - NSDCCPCAREPLAN_GEN_ALL_CORE_FT
PRINCIPAL DISCHARGE DIAGNOSIS  Diagnosis: Colon adenocarcinoma  Assessment and Plan of Treatment: WOUND CARE: Keep incisions clean and dry.  BATHING: Please do not submerge wound underwater. You may shower and/or sponge bathe.  ACTIVITY: No heavy lifting or straining. Otherwise, you may return to your usual level of physical activity. If you are taking narcotic pain medication (such as Percocet), do NOT drive a car, operate machinery or make important decisions.  DIET: Low fiber diet  NOTIFY YOUR SURGEON IF: You have any bleeding that does not stop, any pus draining from your wound, any fever (over 100.4 F) or chills, persistent nausea/vomiting, persistent diarrhea, or if your pain is not controlled on your discharge pain medications.  FOLLOW-UP:  1. Follow-up with Dr. Das within 1-2 weeks of discharge.  Please call office for appointment  2. Please follow up with your primary care physician in one week regarding your hospitalization.

## 2024-05-29 NOTE — PROGRESS NOTE ADULT - SUBJECTIVE AND OBJECTIVE BOX
General Surgery Daily Resident Progress Note    OVERNIGHT: MAHAD.     SUBJECTIVE: Pt seen and examined at bedside. Pain well controlled. Tolerating diet.     OBJECTIVE:  Vital Signs Last 24 Hrs  T(C): 37.3 (29 May 2024 00:00), Max: 37.3 (29 May 2024 00:00)  T(F): 99.2 (29 May 2024 00:00), Max: 99.2 (29 May 2024 00:00)  HR: 76 (29 May 2024 00:00) (63 - 80)  BP: 123/67 (29 May 2024 00:00) (119/58 - 162/57)  BP(mean): 81 (28 May 2024 23:30) (81 - 108)  RR: 18 (29 May 2024 00:00) (16 - 32)  SpO2: 97% (29 May 2024 00:00) (94% - 99%)    Parameters below as of 29 May 2024 00:00  Patient On (Oxygen Delivery Method): room air    PHYSICAL EXAM:  Gen: NAD, A&Ox3  Pulm: No respiratory distress, no subcostal retractions  CV: RRR, no JVD  Abd: Soft, NT, mild distension, incision with mild strikethrough   Extremities: Grossly symmetric  LABS:                        9.1    3.73  )-----------( 226      ( 28 May 2024 21:17 )             27.4     05-28    136  |  103  |  17  ----------------------------<  154<H>  4.0   |  22  |  1.19    Ca    8.2<L>      28 May 2024 21:17  Phos  3.7     05-28  Mg     2.1     05-28      PT/INR - ( 28 May 2024 21:17 )   PT: 11.2 sec;   INR: 1.07 ratio         PTT - ( 28 May 2024 21:17 )  PTT:32.0 sec  Urinalysis Basic - ( 28 May 2024 21:17 )    Color: x / Appearance: x / SG: x / pH: x  Gluc: 154 mg/dL / Ketone: x  / Bili: x / Urobili: x   Blood: x / Protein: x / Nitrite: x   Leuk Esterase: x / RBC: x / WBC x   Sq Epi: x / Non Sq Epi: x / Bacteria: x     General Surgery Daily Resident Progress Note    OVERNIGHT: o/n pt w/ melena    SUBJECTIVE: Pt seen and examined at bedside. Pain well controlled. Tolerating diet.     OBJECTIVE:  Vital Signs Last 24 Hrs  T(C): 37.3 (29 May 2024 00:00), Max: 37.3 (29 May 2024 00:00)  T(F): 99.2 (29 May 2024 00:00), Max: 99.2 (29 May 2024 00:00)  HR: 76 (29 May 2024 00:00) (63 - 80)  BP: 123/67 (29 May 2024 00:00) (119/58 - 162/57)  BP(mean): 81 (28 May 2024 23:30) (81 - 108)  RR: 18 (29 May 2024 00:00) (16 - 32)  SpO2: 97% (29 May 2024 00:00) (94% - 99%)    Parameters below as of 29 May 2024 00:00  Patient On (Oxygen Delivery Method): room air    PHYSICAL EXAM:  Gen: NAD, A&Ox3  Pulm: No respiratory distress, no subcostal retractions  CV: RRR, no JVD  Abd: Soft, NT, mild distension, incision with mild strikethrough   Extremities: Grossly symmetric  LABS:                        9.1    3.73  )-----------( 226      ( 28 May 2024 21:17 )             27.4     05-28    136  |  103  |  17  ----------------------------<  154<H>  4.0   |  22  |  1.19    Ca    8.2<L>      28 May 2024 21:17  Phos  3.7     05-28  Mg     2.1     05-28      PT/INR - ( 28 May 2024 21:17 )   PT: 11.2 sec;   INR: 1.07 ratio         PTT - ( 28 May 2024 21:17 )  PTT:32.0 sec  Urinalysis Basic - ( 28 May 2024 21:17 )    Color: x / Appearance: x / SG: x / pH: x  Gluc: 154 mg/dL / Ketone: x  / Bili: x / Urobili: x   Blood: x / Protein: x / Nitrite: x   Leuk Esterase: x / RBC: x / WBC x   Sq Epi: x / Non Sq Epi: x / Bacteria: x

## 2024-05-30 LAB
ADD ON TEST-SPECIMEN IN LAB: SIGNIFICANT CHANGE UP
ANION GAP SERPL CALC-SCNC: 9 MMOL/L — SIGNIFICANT CHANGE UP (ref 5–17)
ANISOCYTOSIS BLD QL: SIGNIFICANT CHANGE UP
BASOPHILS # BLD AUTO: 0 K/UL — SIGNIFICANT CHANGE UP (ref 0–0.2)
BASOPHILS NFR BLD AUTO: 0 % — SIGNIFICANT CHANGE UP (ref 0–2)
BUN SERPL-MCNC: 14 MG/DL — SIGNIFICANT CHANGE UP (ref 7–23)
CALCIUM SERPL-MCNC: 8.4 MG/DL — SIGNIFICANT CHANGE UP (ref 8.4–10.5)
CHLORIDE SERPL-SCNC: 104 MMOL/L — SIGNIFICANT CHANGE UP (ref 96–108)
CO2 SERPL-SCNC: 26 MMOL/L — SIGNIFICANT CHANGE UP (ref 22–31)
CREAT SERPL-MCNC: 1.21 MG/DL — SIGNIFICANT CHANGE UP (ref 0.5–1.3)
EGFR: 60 ML/MIN/1.73M2 — SIGNIFICANT CHANGE UP
EOSINOPHIL # BLD AUTO: 0.11 K/UL — SIGNIFICANT CHANGE UP (ref 0–0.5)
EOSINOPHIL NFR BLD AUTO: 4 % — SIGNIFICANT CHANGE UP (ref 0–6)
GLUCOSE BLDC GLUCOMTR-MCNC: 165 MG/DL — HIGH (ref 70–99)
GLUCOSE BLDC GLUCOMTR-MCNC: 195 MG/DL — HIGH (ref 70–99)
GLUCOSE BLDC GLUCOMTR-MCNC: 209 MG/DL — HIGH (ref 70–99)
GLUCOSE BLDC GLUCOMTR-MCNC: 245 MG/DL — HIGH (ref 70–99)
GLUCOSE SERPL-MCNC: 147 MG/DL — HIGH (ref 70–99)
HCT VFR BLD CALC: 26.9 % — LOW (ref 39–50)
HGB BLD-MCNC: 8.7 G/DL — LOW (ref 13–17)
HYPOCHROMIA BLD QL: SLIGHT — SIGNIFICANT CHANGE UP
LYMPHOCYTES # BLD AUTO: 0.64 K/UL — LOW (ref 1–3.3)
LYMPHOCYTES # BLD AUTO: 23 % — SIGNIFICANT CHANGE UP (ref 13–44)
MAGNESIUM SERPL-MCNC: 2.2 MG/DL — SIGNIFICANT CHANGE UP (ref 1.6–2.6)
MANUAL SMEAR VERIFICATION: SIGNIFICANT CHANGE UP
MCHC RBC-ENTMCNC: 26.2 PG — LOW (ref 27–34)
MCHC RBC-ENTMCNC: 32.3 GM/DL — SIGNIFICANT CHANGE UP (ref 32–36)
MCV RBC AUTO: 81 FL — SIGNIFICANT CHANGE UP (ref 80–100)
MONOCYTES # BLD AUTO: 0.47 K/UL — SIGNIFICANT CHANGE UP (ref 0–0.9)
MONOCYTES NFR BLD AUTO: 17 % — HIGH (ref 2–14)
MYELOCYTES NFR BLD: 1 % — HIGH (ref 0–0)
NEUTROPHILS # BLD AUTO: 1.52 K/UL — LOW (ref 1.8–7.4)
NEUTROPHILS NFR BLD AUTO: 55 % — SIGNIFICANT CHANGE UP (ref 43–77)
NRBC # BLD: 0 /100 WBCS — SIGNIFICANT CHANGE UP (ref 0–0)
PHOSPHATE SERPL-MCNC: 2.8 MG/DL — SIGNIFICANT CHANGE UP (ref 2.5–4.5)
PLAT MORPH BLD: NORMAL — SIGNIFICANT CHANGE UP
PLATELET # BLD AUTO: 246 K/UL — SIGNIFICANT CHANGE UP (ref 150–400)
POIKILOCYTOSIS BLD QL AUTO: SLIGHT — SIGNIFICANT CHANGE UP
POTASSIUM SERPL-MCNC: 4.4 MMOL/L — SIGNIFICANT CHANGE UP (ref 3.5–5.3)
POTASSIUM SERPL-SCNC: 4.4 MMOL/L — SIGNIFICANT CHANGE UP (ref 3.5–5.3)
RBC # BLD: 3.32 M/UL — LOW (ref 4.2–5.8)
RBC # FLD: 22.4 % — HIGH (ref 10.3–14.5)
RBC BLD AUTO: ABNORMAL
SODIUM SERPL-SCNC: 139 MMOL/L — SIGNIFICANT CHANGE UP (ref 135–145)
WBC # BLD: 2.77 K/UL — LOW (ref 3.8–10.5)
WBC # FLD AUTO: 2.77 K/UL — LOW (ref 3.8–10.5)

## 2024-05-30 RX ADMIN — ATORVASTATIN CALCIUM 20 MILLIGRAM(S): 80 TABLET, FILM COATED ORAL at 21:41

## 2024-05-30 RX ADMIN — FINASTERIDE 5 MILLIGRAM(S): 5 TABLET, FILM COATED ORAL at 12:15

## 2024-05-30 RX ADMIN — SENNA PLUS 2 TABLET(S): 8.6 TABLET ORAL at 21:41

## 2024-05-30 RX ADMIN — PANTOPRAZOLE SODIUM 40 MILLIGRAM(S): 20 TABLET, DELAYED RELEASE ORAL at 17:30

## 2024-05-30 RX ADMIN — POLYETHYLENE GLYCOL 3350 17 GRAM(S): 17 POWDER, FOR SOLUTION ORAL at 12:14

## 2024-05-30 RX ADMIN — LOSARTAN POTASSIUM 100 MILLIGRAM(S): 100 TABLET, FILM COATED ORAL at 05:19

## 2024-05-30 RX ADMIN — Medication 2: at 08:26

## 2024-05-30 RX ADMIN — TAMSULOSIN HYDROCHLORIDE 0.4 MILLIGRAM(S): 0.4 CAPSULE ORAL at 21:43

## 2024-05-30 RX ADMIN — Medication 12.5 MILLIGRAM(S): at 05:20

## 2024-05-30 RX ADMIN — Medication 4: at 12:15

## 2024-05-30 RX ADMIN — Medication 2: at 17:30

## 2024-05-30 RX ADMIN — Medication 12.5 MILLIGRAM(S): at 17:30

## 2024-05-30 RX ADMIN — ENOXAPARIN SODIUM 40 MILLIGRAM(S): 100 INJECTION SUBCUTANEOUS at 17:30

## 2024-05-30 RX ADMIN — FAMOTIDINE 10 MILLIGRAM(S): 10 INJECTION INTRAVENOUS at 14:38

## 2024-05-30 RX ADMIN — Medication 1000 MILLIGRAM(S): at 21:41

## 2024-05-30 RX ADMIN — PANTOPRAZOLE SODIUM 40 MILLIGRAM(S): 20 TABLET, DELAYED RELEASE ORAL at 05:20

## 2024-05-30 RX ADMIN — Medication 4: at 22:04

## 2024-05-30 RX ADMIN — Medication 1000 MILLIGRAM(S): at 22:11

## 2024-05-30 NOTE — PROVIDER CONTACT NOTE (OTHER) - RECOMMENDATIONS
repeat CBC?
notify provider? EGD? PRBCS?  labs
MD notified. Pt educated in regards of medication importance.
MD notified.

## 2024-05-30 NOTE — PROVIDER CONTACT NOTE (OTHER) - REASON
pt refused tylenol. Pt denies having pain.
Multiple bright red, liquid stools
pt had multiple episodes of melena overnight
pt had small BM X3 Melena

## 2024-05-30 NOTE — PROVIDER CONTACT NOTE (OTHER) - ASSESSMENT
Patient axox4, denies CP chest pain, sob or distress. VSS. Multiple bright red, liquid stools, CBC stable.
pt AOX4. pt denies SOB or chest pain. VsS completed. pt resting comfortably. pt denies feeling light headed.
pt AOX4. pt denies SOB or chest pain. Vss completed. Pt denies having pain and refuses tylenol.
pt remains A&O-4. pt remains on room air. pt blood pressure remains within defined limits.   CBC drawn at midnight after first 2 episodes of melena on shift

## 2024-05-30 NOTE — PROVIDER CONTACT NOTE (OTHER) - SITUATION
pt had small BM X3 Melena at the beginning of the shift.
pt had multiple episodes of melena overnight.
Multiple bright red, liquid stools

## 2024-05-30 NOTE — PROGRESS NOTE ADULT - ASSESSMENT
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    Now s/p Lap R hemicolectomy. Tolerated procedure well. Course complicated by lower GI bleed, found to have bleeding at staple line on CTA. Transferred to SICU for hemodynamic monitoring.   5/29- Melena     Plan  - Melena BM, hemodynamically stable f/u AM labs  - Monitor for s/s anemia  - Regular   - melanotic stools noted yesterday, likely residual old blood from previous bleeding, h/h has been stable with no evidence of acute bleeding or BRBPR  - needs aggressive PT       Green Team   [id number]. 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    Now s/p Lap R hemicolectomy. Tolerated procedure well. Course complicated by lower GI bleed, found to have bleeding at staple line on CTA. Transferred to SICU for hemodynamic monitoring.   5/29- Melena, leukopenia  5/30- Continued melena overnight, pt stable and asymptomatic. Leukopenia contiues   Plan  - Melena BM, hemodynamically stable f/u AM labs  - WBC continue to downtrend, if pt remains leukopenic, will consult Heme & Onc  - Monitor for s/s anemia  - Regular   - melanotic stools noted yesterday, likely residual old blood from previous bleeding, h/h has been stable with no evidence of acute bleeding or BRBPR  - needs aggressive PT       Green Team   [id number].

## 2024-05-30 NOTE — PROVIDER CONTACT NOTE (OTHER) - ACTION/TREATMENT ORDERED:
MD made aware. Plan of care ongoing.
Md made aware. Plan of care ongoing. Pt educated in regards of medication importance.
PA aware, labs drawn and pending. W1unit PRBCs pending. Will continue to monitor for bleeding and s/s of distress
no interventions ordered at this time.

## 2024-05-30 NOTE — PROGRESS NOTE ADULT - SUBJECTIVE AND OBJECTIVE BOX
General Surgery Daily Resident Progress Note    OVERNIGHT: MAHAD.     SUBJECTIVE: Pt seen and examined at bedside. Pain well controlled.     OBJECTIVE:  Vital Signs Last 24 Hrs  T(C): 36.8 (30 May 2024 00:58), Max: 36.9 (29 May 2024 17:43)  T(F): 98.3 (30 May 2024 00:58), Max: 98.5 (29 May 2024 17:43)  HR: 58 (30 May 2024 00:58) (58 - 79)  BP: 150/62 (30 May 2024 00:58) (146/78 - 167/70)  BP(mean): --  RR: 18 (30 May 2024 00:58) (18 - 18)  SpO2: 94% (30 May 2024 00:58) (93% - 97%)    Parameters below as of 30 May 2024 00:58  Patient On (Oxygen Delivery Method): room air    PHYSICAL EXAM:  Gen: NAD, A&Ox3  Pulm: No respiratory distress, no subcostal retractions  CV: RRR, no JVD  Abd: Soft, NT, mild distension, incision with mild strikethrough   Extremities: Grossly symmetric    LABS:                        8.8    2.45  )-----------( 218      ( 29 May 2024 07:55 )             27.4     05-29    138  |  102  |  15  ----------------------------<  124<H>  3.9   |  23  |  1.13    Ca    8.0<L>      29 May 2024 07:55  Phos  3.0     05-29  Mg     2.1     05-29      PT/INR - ( 29 May 2024 07:55 )   PT: 11.4 sec;   INR: 1.04 ratio         PTT - ( 29 May 2024 07:55 )  PTT:33.2 sec  Urinalysis Basic - ( 29 May 2024 07:55 )    Color: x / Appearance: x / SG: x / pH: x  Gluc: 124 mg/dL / Ketone: x  / Bili: x / Urobili: x   Blood: x / Protein: x / Nitrite: x   Leuk Esterase: x / RBC: x / WBC x   Sq Epi: x / Non Sq Epi: x / Bacteria: x     General Surgery Daily Resident Progress Note    OVERNIGHT: Megha ALANIS     SUBJECTIVE: Pt seen and examined at bedside. Pain well controlled.     OBJECTIVE:  Vital Signs Last 24 Hrs  T(C): 36.8 (30 May 2024 00:58), Max: 36.9 (29 May 2024 17:43)  T(F): 98.3 (30 May 2024 00:58), Max: 98.5 (29 May 2024 17:43)  HR: 58 (30 May 2024 00:58) (58 - 79)  BP: 150/62 (30 May 2024 00:58) (146/78 - 167/70)  BP(mean): --  RR: 18 (30 May 2024 00:58) (18 - 18)  SpO2: 94% (30 May 2024 00:58) (93% - 97%)    Parameters below as of 30 May 2024 00:58  Patient On (Oxygen Delivery Method): room air    PHYSICAL EXAM:  Gen: NAD, A&Ox3  Pulm: No respiratory distress, no subcostal retractions  CV: RRR, no JVD  Abd: Soft, NT, mild distension, incision with mild strikethrough   Extremities: Grossly symmetric    LABS:                        8.8    2.45  )-----------( 218      ( 29 May 2024 07:55 )             27.4     05-29    138  |  102  |  15  ----------------------------<  124<H>  3.9   |  23  |  1.13    Ca    8.0<L>      29 May 2024 07:55  Phos  3.0     05-29  Mg     2.1     05-29      PT/INR - ( 29 May 2024 07:55 )   PT: 11.4 sec;   INR: 1.04 ratio         PTT - ( 29 May 2024 07:55 )  PTT:33.2 sec  Urinalysis Basic - ( 29 May 2024 07:55 )    Color: x / Appearance: x / SG: x / pH: x  Gluc: 124 mg/dL / Ketone: x  / Bili: x / Urobili: x   Blood: x / Protein: x / Nitrite: x   Leuk Esterase: x / RBC: x / WBC x   Sq Epi: x / Non Sq Epi: x / Bacteria: x

## 2024-05-30 NOTE — PROVIDER CONTACT NOTE (OTHER) - BACKGROUND
84 y male w/ PHM heart murmur, HLD,HTN, CAD s/p PCI, s/p ex lap w/ right hemicolectomy c/b bleed at anastomosis
Admitting Diagnosis: Malignant neoplasm of ascending colon.
Admitting Diagnosis:  Malignant Neoplasm of colon
pt admitted for colon adenocarcinoma. pt had bedside colonoscopy performed. pt has had recurrent episodes of melena.

## 2024-05-31 ENCOUNTER — TRANSCRIPTION ENCOUNTER (OUTPATIENT)
Age: 84
End: 2024-05-31

## 2024-05-31 VITALS
DIASTOLIC BLOOD PRESSURE: 65 MMHG | HEART RATE: 70 BPM | RESPIRATION RATE: 18 BRPM | TEMPERATURE: 98 F | SYSTOLIC BLOOD PRESSURE: 135 MMHG | OXYGEN SATURATION: 95 %

## 2024-05-31 LAB
ANION GAP SERPL CALC-SCNC: 13 MMOL/L — SIGNIFICANT CHANGE UP (ref 5–17)
ANISOCYTOSIS BLD QL: SLIGHT — SIGNIFICANT CHANGE UP
BASOPHILS # BLD AUTO: 0 K/UL — SIGNIFICANT CHANGE UP (ref 0–0.2)
BASOPHILS NFR BLD AUTO: 0 % — SIGNIFICANT CHANGE UP (ref 0–2)
BUN SERPL-MCNC: 15 MG/DL — SIGNIFICANT CHANGE UP (ref 7–23)
CALCIUM SERPL-MCNC: 8.6 MG/DL — SIGNIFICANT CHANGE UP (ref 8.4–10.5)
CHLORIDE SERPL-SCNC: 105 MMOL/L — SIGNIFICANT CHANGE UP (ref 96–108)
CO2 SERPL-SCNC: 22 MMOL/L — SIGNIFICANT CHANGE UP (ref 22–31)
CREAT SERPL-MCNC: 1.12 MG/DL — SIGNIFICANT CHANGE UP (ref 0.5–1.3)
DACRYOCYTES BLD QL SMEAR: SLIGHT — SIGNIFICANT CHANGE UP
EGFR: 66 ML/MIN/1.73M2 — SIGNIFICANT CHANGE UP
EOSINOPHIL # BLD AUTO: 0.13 K/UL — SIGNIFICANT CHANGE UP (ref 0–0.5)
EOSINOPHIL NFR BLD AUTO: 4.4 % — SIGNIFICANT CHANGE UP (ref 0–6)
GLUCOSE BLDC GLUCOMTR-MCNC: 169 MG/DL — HIGH (ref 70–99)
GLUCOSE SERPL-MCNC: 169 MG/DL — HIGH (ref 70–99)
HCT VFR BLD CALC: 27.6 % — LOW (ref 39–50)
HGB BLD-MCNC: 9 G/DL — LOW (ref 13–17)
LYMPHOCYTES # BLD AUTO: 0.42 K/UL — LOW (ref 1–3.3)
LYMPHOCYTES # BLD AUTO: 14.2 % — SIGNIFICANT CHANGE UP (ref 13–44)
MAGNESIUM SERPL-MCNC: 2.3 MG/DL — SIGNIFICANT CHANGE UP (ref 1.6–2.6)
MANUAL SMEAR VERIFICATION: SIGNIFICANT CHANGE UP
MCHC RBC-ENTMCNC: 26.5 PG — LOW (ref 27–34)
MCHC RBC-ENTMCNC: 32.6 GM/DL — SIGNIFICANT CHANGE UP (ref 32–36)
MCV RBC AUTO: 81.4 FL — SIGNIFICANT CHANGE UP (ref 80–100)
MONOCYTES # BLD AUTO: 0.55 K/UL — SIGNIFICANT CHANGE UP (ref 0–0.9)
MONOCYTES NFR BLD AUTO: 18.6 % — HIGH (ref 2–14)
NEUTROPHILS # BLD AUTO: 1.85 K/UL — SIGNIFICANT CHANGE UP (ref 1.8–7.4)
NEUTROPHILS NFR BLD AUTO: 62.8 % — SIGNIFICANT CHANGE UP (ref 43–77)
OVALOCYTES BLD QL SMEAR: SLIGHT — SIGNIFICANT CHANGE UP
PHOSPHATE SERPL-MCNC: 3.1 MG/DL — SIGNIFICANT CHANGE UP (ref 2.5–4.5)
PLAT MORPH BLD: ABNORMAL
PLATELET # BLD AUTO: 253 K/UL — SIGNIFICANT CHANGE UP (ref 150–400)
POIKILOCYTOSIS BLD QL AUTO: SLIGHT — SIGNIFICANT CHANGE UP
POLYCHROMASIA BLD QL SMEAR: SLIGHT — SIGNIFICANT CHANGE UP
POTASSIUM SERPL-MCNC: 4.1 MMOL/L — SIGNIFICANT CHANGE UP (ref 3.5–5.3)
POTASSIUM SERPL-SCNC: 4.1 MMOL/L — SIGNIFICANT CHANGE UP (ref 3.5–5.3)
RBC # BLD: 3.39 M/UL — LOW (ref 4.2–5.8)
RBC # FLD: 22 % — HIGH (ref 10.3–14.5)
RBC BLD AUTO: ABNORMAL
SCHISTOCYTES BLD QL AUTO: SLIGHT — SIGNIFICANT CHANGE UP
SODIUM SERPL-SCNC: 140 MMOL/L — SIGNIFICANT CHANGE UP (ref 135–145)
TARGETS BLD QL SMEAR: SLIGHT — SIGNIFICANT CHANGE UP
WBC # BLD: 2.95 K/UL — LOW (ref 3.8–10.5)
WBC # FLD AUTO: 2.95 K/UL — LOW (ref 3.8–10.5)

## 2024-05-31 PROCEDURE — P9100: CPT

## 2024-05-31 PROCEDURE — 82565 ASSAY OF CREATININE: CPT

## 2024-05-31 PROCEDURE — 85027 COMPLETE CBC AUTOMATED: CPT

## 2024-05-31 PROCEDURE — P9037: CPT

## 2024-05-31 PROCEDURE — 86901 BLOOD TYPING SEROLOGIC RH(D): CPT

## 2024-05-31 PROCEDURE — 85018 HEMOGLOBIN: CPT

## 2024-05-31 PROCEDURE — 86985 SPLIT BLOOD OR PRODUCTS: CPT

## 2024-05-31 PROCEDURE — 80048 BASIC METABOLIC PNL TOTAL CA: CPT

## 2024-05-31 PROCEDURE — 97530 THERAPEUTIC ACTIVITIES: CPT

## 2024-05-31 PROCEDURE — 85014 HEMATOCRIT: CPT

## 2024-05-31 PROCEDURE — 85396 CLOTTING ASSAY WHOLE BLOOD: CPT

## 2024-05-31 PROCEDURE — P9016: CPT

## 2024-05-31 PROCEDURE — 83735 ASSAY OF MAGNESIUM: CPT

## 2024-05-31 PROCEDURE — C1889: CPT

## 2024-05-31 PROCEDURE — 83605 ASSAY OF LACTIC ACID: CPT

## 2024-05-31 PROCEDURE — 88309 TISSUE EXAM BY PATHOLOGIST: CPT

## 2024-05-31 PROCEDURE — 86923 COMPATIBILITY TEST ELECTRIC: CPT

## 2024-05-31 PROCEDURE — P9011: CPT

## 2024-05-31 PROCEDURE — 84132 ASSAY OF SERUM POTASSIUM: CPT

## 2024-05-31 PROCEDURE — 88342 IMHCHEM/IMCYTCHM 1ST ANTB: CPT

## 2024-05-31 PROCEDURE — 97161 PT EVAL LOW COMPLEX 20 MIN: CPT

## 2024-05-31 PROCEDURE — 84295 ASSAY OF SERUM SODIUM: CPT

## 2024-05-31 PROCEDURE — 84100 ASSAY OF PHOSPHORUS: CPT

## 2024-05-31 PROCEDURE — 82962 GLUCOSE BLOOD TEST: CPT

## 2024-05-31 PROCEDURE — C9399: CPT

## 2024-05-31 PROCEDURE — 82435 ASSAY OF BLOOD CHLORIDE: CPT

## 2024-05-31 PROCEDURE — 36430 TRANSFUSION BLD/BLD COMPNT: CPT

## 2024-05-31 PROCEDURE — 82330 ASSAY OF CALCIUM: CPT

## 2024-05-31 PROCEDURE — 82947 ASSAY GLUCOSE BLOOD QUANT: CPT

## 2024-05-31 PROCEDURE — 85025 COMPLETE CBC W/AUTO DIFF WBC: CPT

## 2024-05-31 PROCEDURE — 74177 CT ABD & PELVIS W/CONTRAST: CPT | Mod: MC

## 2024-05-31 PROCEDURE — 97116 GAIT TRAINING THERAPY: CPT

## 2024-05-31 PROCEDURE — P9059: CPT

## 2024-05-31 PROCEDURE — 88341 IMHCHEM/IMCYTCHM EA ADD ANTB: CPT

## 2024-05-31 PROCEDURE — 82803 BLOOD GASES ANY COMBINATION: CPT

## 2024-05-31 PROCEDURE — 85610 PROTHROMBIN TIME: CPT

## 2024-05-31 PROCEDURE — 86900 BLOOD TYPING SEROLOGIC ABO: CPT

## 2024-05-31 PROCEDURE — 36415 COLL VENOUS BLD VENIPUNCTURE: CPT

## 2024-05-31 PROCEDURE — 85730 THROMBOPLASTIN TIME PARTIAL: CPT

## 2024-05-31 PROCEDURE — 86850 RBC ANTIBODY SCREEN: CPT

## 2024-05-31 RX ORDER — CLOPIDOGREL BISULFATE 75 MG/1
1 TABLET, FILM COATED ORAL
Refills: 0 | DISCHARGE

## 2024-05-31 RX ORDER — ACETAMINOPHEN 500 MG
2 TABLET ORAL
Qty: 0 | Refills: 0 | DISCHARGE
Start: 2024-05-31

## 2024-05-31 RX ORDER — SENNA PLUS 8.6 MG/1
2 TABLET ORAL
Qty: 0 | Refills: 0 | DISCHARGE
Start: 2024-05-31

## 2024-05-31 RX ORDER — FERROUS SULFATE 325(65) MG
1 TABLET ORAL
Refills: 0 | DISCHARGE

## 2024-05-31 RX ORDER — POLYETHYLENE GLYCOL 3350 17 G/17G
17 POWDER, FOR SOLUTION ORAL
Qty: 0 | Refills: 0 | DISCHARGE
Start: 2024-05-31

## 2024-05-31 RX ADMIN — POLYETHYLENE GLYCOL 3350 17 GRAM(S): 17 POWDER, FOR SOLUTION ORAL at 11:11

## 2024-05-31 RX ADMIN — Medication 1000 MILLIGRAM(S): at 04:00

## 2024-05-31 RX ADMIN — Medication 12.5 MILLIGRAM(S): at 06:22

## 2024-05-31 RX ADMIN — FINASTERIDE 5 MILLIGRAM(S): 5 TABLET, FILM COATED ORAL at 11:11

## 2024-05-31 RX ADMIN — LOSARTAN POTASSIUM 100 MILLIGRAM(S): 100 TABLET, FILM COATED ORAL at 06:22

## 2024-05-31 RX ADMIN — PANTOPRAZOLE SODIUM 40 MILLIGRAM(S): 20 TABLET, DELAYED RELEASE ORAL at 06:20

## 2024-05-31 RX ADMIN — Medication 2: at 08:57

## 2024-05-31 RX ADMIN — Medication 1000 MILLIGRAM(S): at 04:30

## 2024-05-31 NOTE — PROGRESS NOTE ADULT - ATTENDING SUPERVISION STATEMENT
Fellow
Resident
Resident/Fellow

## 2024-05-31 NOTE — PROGRESS NOTE ADULT - ASSESSMENT
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    Now s/p Lap R hemicolectomy. Tolerated procedure well. Course complicated by lower GI bleed, found to have bleeding at staple line on CTA. Transferred to SICU for hemodynamic monitoring.   5/29- Melena, leukopenia  5/30- Continued melena overnight, pt stable and asymptomatic. Leukopenia contiues   Plan  - f/u AM labs   - WBC continue to downtrend, if pt remains leukopenic, will consult Heme & Onc  - Monitor for s/s anemia  - Regular   - needs aggressive PT       Green Team   [id number].

## 2024-05-31 NOTE — PROGRESS NOTE ADULT - ATTENDING COMMENTS
84yr M hx htn CAD stents bph anastomitc bleed    ON: distention abd    aaox3, intact, sitting in chair  tylenol, oxy  on RA  no AM CXR  stable hemodynamics  losartan 100, start metop  lact clear  NPO out of concern for ileus  home pepcid  melena BMs yesterday  LR @ 75/hr  voiding, pos 700cc balance  finasteride and flomax home  resovlved NAKIA  no chem VTE PPX, on SCD  discuss with primary regarding restart of home plavix and/or lovenox  stable Hb  off antibiotics, afebrile  SSI, glycemic control  PT OT working with him, in chair    PIV
I have seen and examined the patient. I agree with the above surgery resident's note.  home today- doing well
Patient seen and examined and agree with above.   No acute events overnight.     Current management includes:  Neuro- pain currently controlled with current regimen  CV- Hemodynamically stable   Pulm - on room air with appropriate oxygenation      -goal SpO2 92%  GI-Ensure clears and CLD  ID- no antibiotics, normal WBC  Endocrine - hyperglycemia - will continue ISS   -goal BG < 180  Heme- will repeat CBC at noon and if stable Hb will resume DVT ppx     Will continue to monitor closely.
events overngiht noted- d/w dr hogan overnight  no bleeding since colo overnight  abd benign  brisk uop  vss  mionitor for further bleeding  tx prbcs  if ongoing bleeding will rtor  d/w pt and wife
84yr M hx htn CAD stents bph anastomitc bleed    ON: further blood transfusions, scope performed, hemostatic agent applied    aaox3, intact  tylenol, oxy  on RA  no AM CXR  stable hemodynamics  home BP meds on hold  lact clear  NPO except meds  protonix  BM 800cc bloody, no further since AM  LR @ 100/hr  juarez in place, 2.8 lit urine, balance 500cc neg   finasteride and flomax  improved Cr, NAKIA improving  no chem VTE PPX, no asa, on SCD  TEG   4 rbc, 4 plasma, will transfuse 1 plt, given risk of need for OR is higher than theoretical risk to well established stents  repeat TEG  off anitbiotics, afebrile  SSI, glycemic control  PT OT when able    PIV
I have seen and examined the patient. I agree with the above surgery resident's note.  doing well pod 1  cont erp
less distendd, feels better, h/h stable  LRD  tx to floor
no further bleeding  continue to monitor  case discussed with surgery
84yr M hx htn CAD stents bph anastomitc bleed    ON: no major events    aaox3, intact  tylenol, oxy  on RA  no AM CXR  stable hemodynamics  restart losartan 100, metop on hold for HR 60  lact clear  clear liquid diet  protonix dc, home pepcid restart  BM no further bloody m  dc IV fluids  juarez in place, 1.5 lit urine,balance 1.8 lit, dc juarez, TOV  finasteride and flomax restarted  resovlved NAKIA  no chem VTE PPX, no plavix, on SCD  stable Hb  off anitbiotics, afebrile  SSI, glycemic control  PT OT working with him, OOB now    PIV
I saw and examined the patient, and reviewed  the history with the patient and   Agree with note which was also reviewed and edited where appropriate.  D/W patient, RN, residents and Fellow
no further bleeding since colo, h/h stable  abd benign  clears
I have seen and examined the patient. I agree with the above surgery resident's note.  feels well  abd benign  brown bms  tolp o  home tomororw if hg stable
dong well, nosign of ongoing gi bleed, h/h stable, diet as chava  abd benign  f/u wbc

## 2024-05-31 NOTE — DISCHARGE NOTE NURSING/CASE MANAGEMENT/SOCIAL WORK - PATIENT PORTAL LINK FT
You can access the FollowMyHealth Patient Portal offered by [facility] by registering at the following website: [email or web address]. By joining [facility]’s FollowMyHealth portal, you will also be able to view your health information using other applications (apps) compatible with our system.

## 2024-05-31 NOTE — PROGRESS NOTE ADULT - PROVIDER SPECIALTY LIST ADULT
SICU
Surgery
Gastroenterology
SICU
Surgery

## 2024-05-31 NOTE — PROGRESS NOTE ADULT - SUBJECTIVE AND OBJECTIVE BOX
General Surgery Daily Resident Progress Note    OVERNIGHT: MAHAD.     SUBJECTIVE: Pt seen and examined at bedside. Pain well controlled. Tolerating diet. Having non bloody BMs.     OBJECTIVE:  Vital Signs Last 24 Hrs  T(C): 36.6 (30 May 2024 22:00), Max: 36.8 (30 May 2024 04:56)  T(F): 97.8 (30 May 2024 22:00), Max: 98.2 (30 May 2024 04:56)  HR: 64 (30 May 2024 22:00) (64 - 77)  BP: 138/68 (30 May 2024 22:00) (111/60 - 159/62)  BP(mean): --  RR: 18 (30 May 2024 22:00) (18 - 18)  SpO2: 97% (30 May 2024 22:00) (95% - 98%)    Parameters below as of 30 May 2024 22:00  Patient On (Oxygen Delivery Method): room air    PHYSICAL EXAM:  Gen: NAD, A&Ox3  Pulm: No respiratory distress, no subcostal retractions  CV: RRR, no JVD  Abd: Soft, NT, mild distension, incision c/d/i  Extremities: Grossly symmetric    LABS:                        8.7    2.77  )-----------( 246      ( 30 May 2024 06:48 )             26.9     05-30    139  |  104  |  14  ----------------------------<  147<H>  4.4   |  26  |  1.21    Ca    8.4      30 May 2024 06:42  Phos  2.8     05-30  Mg     2.2     05-30      PT/INR - ( 29 May 2024 07:55 )   PT: 11.4 sec;   INR: 1.04 ratio         PTT - ( 29 May 2024 07:55 )  PTT:33.2 sec  Urinalysis Basic - ( 30 May 2024 06:42 )    Color: x / Appearance: x / SG: x / pH: x  Gluc: 147 mg/dL / Ketone: x  / Bili: x / Urobili: x   Blood: x / Protein: x / Nitrite: x   Leuk Esterase: x / RBC: x / WBC x   Sq Epi: x / Non Sq Epi: x / Bacteria: x

## 2024-06-04 LAB — SURGICAL PATHOLOGY STUDY: SIGNIFICANT CHANGE UP

## 2024-06-05 ENCOUNTER — NON-APPOINTMENT (OUTPATIENT)
Age: 84
End: 2024-06-05

## 2024-06-07 ENCOUNTER — APPOINTMENT (OUTPATIENT)
Dept: SURGERY | Facility: CLINIC | Age: 84
End: 2024-06-07
Payer: MEDICARE

## 2024-06-07 VITALS
DIASTOLIC BLOOD PRESSURE: 66 MMHG | TEMPERATURE: 97.2 F | RESPIRATION RATE: 17 BRPM | OXYGEN SATURATION: 98 % | HEART RATE: 69 BPM | SYSTOLIC BLOOD PRESSURE: 117 MMHG

## 2024-06-07 DIAGNOSIS — Z09 ENCOUNTER FOR FOLLOW-UP EXAMINATION AFTER COMPLETED TREATMENT FOR CONDITIONS OTHER THAN MALIGNANT NEOPLASM: ICD-10-CM

## 2024-06-07 PROCEDURE — 99024 POSTOP FOLLOW-UP VISIT: CPT

## 2024-06-07 NOTE — HISTORY OF PRESENT ILLNESS
[FreeTextEntry1] : Eng is an 84 year old male here for a post operative visit.  Pt is S/p 05/23/24- Laparoscopic right hemicolectomy. for Right colon cancer plus right colon polyp. 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) -   Tubular adenoma (3.5 cm), flat configuration with high grade dysplasia, as well as submucosal acellular mucinous extrusion compatible with biopsy site -   Two additional small tubular adenomas -   Resection margins negative for tumor -   Sixteen lymph nodes negative for tumor (0/16) -   Pathological stage classification (pTNM, AJCC 8th Ed): pT2N0 -   See  Synoptic Summary -   MMR studies are in progress; an addendum to follow -   Appendix without significant pathologic alterations  Colonoscopy 05/7/24 (therapeutic procedure for colon polyps in ascending colon, possible FTRD) - One 30 mm polyp in the proximal ascending colon not resected given the concern for malignancy. Biopsied. One 25 mm polyp in the ascending colon distal to larger polyp not resected. Pathology: 1. Colon, ascending, polyp, biopsy- Adenocarcinoma, moderately differentiated, arising from background of tubulovillous adenoma with high grade dysplasia.  Today pt reports no pain. Denies drainage, bleeding, swelling, and redness of surgical incision. Denies nausea and vomiting. Denies fever and chills. Daily 1-2 times BM, becoming more formed (still a bit loose), takes MiraLAX and senna. Good appetite, low fiber diet.

## 2024-06-07 NOTE — ASSESSMENT
[FreeTextEntry1] : In summary the patient is doing well status post laparoscopic right hemicolectomy for a malignant mass and adjacent benign polyp.  Pathology revealed a T2 N0 colon cancer with a adjacent polyp with high-grade dysplasia.  All lymph nodes were negative for metastatic disease.  His postoperative course was complicated by an anastomotic bleed requiring colonoscopic intervention.  He subsequently recovered was discharged and now feels well.  His energy and appetite are improving he is moving his bowels daily.  His abdomen is benign.  I reassured him that there will be no further need for chemotherapy.  He should have a surveillance colonoscopy in 1 year.  I will see him in 1 month for a postoperative checkup.

## 2024-06-07 NOTE — CONSULT LETTER
[Dear  ___] : Dear  [unfilled], [Consult Letter:] : I had the pleasure of evaluating your patient, [unfilled]. [Please see my note below.] : Please see my note below. [Consult Closing:] : Thank you very much for allowing me to participate in the care of this patient.  If you have any questions, please do not hesitate to contact me. [Sincerely,] : Sincerely, [FreeTextEntry3] : Malcoml Das M.D., F.A.C.S, F.A.S.C.R.S [DrRomeo  ___] : Dr. DEGROOT [DrRomeo ___] : Dr. DEGROOT

## 2024-07-09 ENCOUNTER — APPOINTMENT (OUTPATIENT)
Dept: SURGERY | Facility: CLINIC | Age: 84
End: 2024-07-09
Payer: MEDICARE

## 2024-07-09 VITALS
HEART RATE: 74 BPM | RESPIRATION RATE: 17 BRPM | OXYGEN SATURATION: 98 % | SYSTOLIC BLOOD PRESSURE: 127 MMHG | DIASTOLIC BLOOD PRESSURE: 77 MMHG | TEMPERATURE: 97.1 F

## 2024-07-09 DIAGNOSIS — Z09 ENCOUNTER FOR FOLLOW-UP EXAMINATION AFTER COMPLETED TREATMENT FOR CONDITIONS OTHER THAN MALIGNANT NEOPLASM: ICD-10-CM

## 2024-07-09 PROCEDURE — 99024 POSTOP FOLLOW-UP VISIT: CPT

## 2024-08-28 ENCOUNTER — RX RENEWAL (OUTPATIENT)
Age: 84
End: 2024-08-28

## 2024-09-10 ENCOUNTER — APPOINTMENT (OUTPATIENT)
Dept: GASTROENTEROLOGY | Facility: CLINIC | Age: 84
End: 2024-09-10

## 2024-09-10 NOTE — H&P PST ADULT - RESPIRATORY RATE (BREATHS/MIN)
[Initial Evaluation] : an initial evaluation for [Mother] : mother [FreeTextEntry2] : unilateral hearing loss  16

## 2024-10-16 NOTE — PROVIDER CONTACT NOTE (CRITICAL VALUE NOTIFICATION) - SITUATION
- CTA chest bilateral PE  - continues on heparin gtt       Fatigue, weakness and fever at home yesterday, afebrile today

## 2024-10-31 ENCOUNTER — APPOINTMENT (OUTPATIENT)
Dept: CARDIOLOGY | Facility: CLINIC | Age: 84
End: 2024-10-31
Payer: MEDICARE

## 2024-10-31 ENCOUNTER — NON-APPOINTMENT (OUTPATIENT)
Age: 84
End: 2024-10-31

## 2024-10-31 VITALS
BODY MASS INDEX: 21.98 KG/M2 | RESPIRATION RATE: 15 BRPM | DIASTOLIC BLOOD PRESSURE: 60 MMHG | SYSTOLIC BLOOD PRESSURE: 114 MMHG | HEART RATE: 70 BPM | OXYGEN SATURATION: 99 % | TEMPERATURE: 97.1 F | WEIGHT: 145 LBS | HEIGHT: 68 IN

## 2024-10-31 VITALS
HEART RATE: 70 BPM | OXYGEN SATURATION: 99 % | HEIGHT: 68 IN | DIASTOLIC BLOOD PRESSURE: 60 MMHG | SYSTOLIC BLOOD PRESSURE: 114 MMHG | BODY MASS INDEX: 21.98 KG/M2 | WEIGHT: 145 LBS

## 2024-10-31 DIAGNOSIS — I25.10 ATHEROSCLEROTIC HEART DISEASE OF NATIVE CORONARY ARTERY W/OUT ANGINA PECTORIS: ICD-10-CM

## 2024-10-31 DIAGNOSIS — E78.2 MIXED HYPERLIPIDEMIA: ICD-10-CM

## 2024-10-31 DIAGNOSIS — R01.1 CARDIAC MURMUR, UNSPECIFIED: ICD-10-CM

## 2024-10-31 DIAGNOSIS — E11.9 TYPE 2 DIABETES MELLITUS W/OUT COMPLICATIONS: ICD-10-CM

## 2024-10-31 DIAGNOSIS — I10 ESSENTIAL (PRIMARY) HYPERTENSION: ICD-10-CM

## 2024-10-31 PROCEDURE — 99214 OFFICE O/P EST MOD 30 MIN: CPT

## 2024-10-31 PROCEDURE — 93000 ELECTROCARDIOGRAM COMPLETE: CPT

## 2024-10-31 PROCEDURE — G2211 COMPLEX E/M VISIT ADD ON: CPT

## 2024-11-01 LAB
25(OH)D3 SERPL-MCNC: 60.9 NG/ML
ALBUMIN SERPL ELPH-MCNC: 4.1 G/DL
ALP BLD-CCNC: 63 U/L
ALT SERPL-CCNC: 16 U/L
ANION GAP SERPL CALC-SCNC: 11 MMOL/L
AST SERPL-CCNC: 16 U/L
BASOPHILS # BLD AUTO: 0.06 K/UL
BASOPHILS NFR BLD AUTO: 1.5 %
BILIRUB SERPL-MCNC: 0.4 MG/DL
BUN SERPL-MCNC: 27 MG/DL
CALCIUM SERPL-MCNC: 9.2 MG/DL
CHLORIDE SERPL-SCNC: 105 MMOL/L
CHOLEST SERPL-MCNC: 147 MG/DL
CO2 SERPL-SCNC: 25 MMOL/L
CREAT SERPL-MCNC: 1.47 MG/DL
EGFR: 47 ML/MIN/1.73M2
EOSINOPHIL # BLD AUTO: 0.31 K/UL
EOSINOPHIL NFR BLD AUTO: 7.5 %
ESTIMATED AVERAGE GLUCOSE: 154 MG/DL
GLUCOSE SERPL-MCNC: 151 MG/DL
HBA1C MFR BLD HPLC: 7 %
HCT VFR BLD CALC: 36.2 %
HDLC SERPL-MCNC: 50 MG/DL
HGB BLD-MCNC: 10.8 G/DL
IMM GRANULOCYTES NFR BLD AUTO: 0.2 %
LDLC SERPL CALC-MCNC: 79 MG/DL
LYMPHOCYTES # BLD AUTO: 1.67 K/UL
LYMPHOCYTES NFR BLD AUTO: 40.6 %
MAN DIFF?: NORMAL
MCHC RBC-ENTMCNC: 21.8 PG
MCHC RBC-ENTMCNC: 29.8 G/DL
MCV RBC AUTO: 73 FL
MONOCYTES # BLD AUTO: 0.59 K/UL
MONOCYTES NFR BLD AUTO: 14.4 %
NEUTROPHILS # BLD AUTO: 1.47 K/UL
NEUTROPHILS NFR BLD AUTO: 35.8 %
NONHDLC SERPL-MCNC: 97 MG/DL
PLATELET # BLD AUTO: 214 K/UL
POTASSIUM SERPL-SCNC: 5.1 MMOL/L
PROT SERPL-MCNC: 6.5 G/DL
PSA FREE FLD-MCNC: 40 %
PSA FREE SERPL-MCNC: 0.34 NG/ML
PSA SERPL-MCNC: 0.85 NG/ML
RBC # BLD: 4.96 M/UL
RBC # FLD: 18.4 %
SODIUM SERPL-SCNC: 141 MMOL/L
TRIGL SERPL-MCNC: 100 MG/DL
TSH SERPL-ACNC: 2.75 UIU/ML
WBC # FLD AUTO: 4.11 K/UL

## 2024-12-29 NOTE — H&P PST ADULT - HEIGHT IN FEET
COVID Positive/Requesting COVID treatment    Patient is positive for COVID and requesting treatment options.    Date of positive COVID test (PCR or at home)? 12/27  Current COVID symptoms: cough, shortness of breath or difficulty breathing, fatigue, muscle or body aches, headache, sore throat, congestion or runny nose, and nausea or vomiting  Date COVID symptoms began: 12/26    Message should be routed to clinic RN pool. Best phone number to use for call back: [phone]     5

## 2025-02-18 ENCOUNTER — RX RENEWAL (OUTPATIENT)
Age: 85
End: 2025-02-18

## 2025-05-06 ENCOUNTER — APPOINTMENT (OUTPATIENT)
Dept: GASTROENTEROLOGY | Facility: CLINIC | Age: 85
End: 2025-05-06
Payer: MEDICARE

## 2025-05-06 ENCOUNTER — NON-APPOINTMENT (OUTPATIENT)
Age: 85
End: 2025-05-06

## 2025-05-06 VITALS
BODY MASS INDEX: 21.98 KG/M2 | RESPIRATION RATE: 16 BRPM | HEART RATE: 65 BPM | SYSTOLIC BLOOD PRESSURE: 116 MMHG | WEIGHT: 145 LBS | HEIGHT: 68 IN | DIASTOLIC BLOOD PRESSURE: 67 MMHG | OXYGEN SATURATION: 99 %

## 2025-05-06 DIAGNOSIS — C18.2 MALIGNANT NEOPLASM OF ASCENDING COLON: ICD-10-CM

## 2025-05-06 PROCEDURE — 99214 OFFICE O/P EST MOD 30 MIN: CPT

## 2025-05-06 RX ORDER — SODIUM SULFATE, POTASSIUM SULFATE AND MAGNESIUM SULFATE 1.6; 3.13; 17.5 G/177ML; G/177ML; G/177ML
17.5-3.13-1.6 SOLUTION ORAL
Qty: 2 | Refills: 0 | Status: ACTIVE | COMMUNITY
Start: 2025-05-06 | End: 1900-01-01

## 2025-05-20 ENCOUNTER — RX RENEWAL (OUTPATIENT)
Age: 85
End: 2025-05-20

## 2025-05-21 ENCOUNTER — RX RENEWAL (OUTPATIENT)
Age: 85
End: 2025-05-21

## 2025-05-29 ENCOUNTER — RX RENEWAL (OUTPATIENT)
Age: 85
End: 2025-05-29

## 2025-06-12 ENCOUNTER — APPOINTMENT (OUTPATIENT)
Dept: CARDIOLOGY | Facility: CLINIC | Age: 85
End: 2025-06-12
Payer: MEDICARE

## 2025-06-12 VITALS
WEIGHT: 149 LBS | HEIGHT: 68 IN | SYSTOLIC BLOOD PRESSURE: 124 MMHG | DIASTOLIC BLOOD PRESSURE: 72 MMHG | OXYGEN SATURATION: 98 % | HEART RATE: 58 BPM | BODY MASS INDEX: 22.58 KG/M2

## 2025-06-12 PROCEDURE — G2211 COMPLEX E/M VISIT ADD ON: CPT

## 2025-06-12 PROCEDURE — 99212 OFFICE O/P EST SF 10 MIN: CPT

## 2025-06-12 PROCEDURE — 93306 TTE W/DOPPLER COMPLETE: CPT

## 2025-06-12 PROCEDURE — 93000 ELECTROCARDIOGRAM COMPLETE: CPT | Mod: NC

## 2025-06-18 LAB
25(OH)D3 SERPL-MCNC: 54.8 NG/ML
ALBUMIN SERPL ELPH-MCNC: 3.9 G/DL
ALP BLD-CCNC: 67 U/L
ALT SERPL-CCNC: 22 U/L
ANION GAP SERPL CALC-SCNC: 14 MMOL/L
APPEARANCE: CLEAR
AST SERPL-CCNC: 15 U/L
BASOPHILS # BLD AUTO: 0.05 K/UL
BASOPHILS NFR BLD AUTO: 1.1 %
BILIRUB SERPL-MCNC: 0.5 MG/DL
BILIRUBIN URINE: NEGATIVE
BLOOD URINE: NEGATIVE
BUN SERPL-MCNC: 22 MG/DL
CALCIUM SERPL-MCNC: 9.5 MG/DL
CHLORIDE SERPL-SCNC: 102 MMOL/L
CHOLEST SERPL-MCNC: 136 MG/DL
CO2 SERPL-SCNC: 23 MMOL/L
COLOR: YELLOW
CREAT SERPL-MCNC: 1.3 MG/DL
EGFRCR SERPLBLD CKD-EPI 2021: 55 ML/MIN/1.73M2
EOSINOPHIL # BLD AUTO: 0.2 K/UL
EOSINOPHIL NFR BLD AUTO: 4.5 %
ESTIMATED AVERAGE GLUCOSE: 154 MG/DL
GLUCOSE QUALITATIVE U: NEGATIVE MG/DL
GLUCOSE SERPL-MCNC: 121 MG/DL
HBA1C MFR BLD HPLC: 7 %
HCT VFR BLD CALC: 42.6 %
HDLC SERPL-MCNC: 51 MG/DL
HGB BLD-MCNC: 13.8 G/DL
IMM GRANULOCYTES NFR BLD AUTO: 0.2 %
KETONES URINE: NEGATIVE MG/DL
LDLC SERPL-MCNC: 69 MG/DL
LEUKOCYTE ESTERASE URINE: NEGATIVE
LYMPHOCYTES # BLD AUTO: 1.29 K/UL
LYMPHOCYTES NFR BLD AUTO: 28.7 %
MAN DIFF?: NORMAL
MCHC RBC-ENTMCNC: 27.6 PG
MCHC RBC-ENTMCNC: 32.4 G/DL
MCV RBC AUTO: 85.2 FL
MONOCYTES # BLD AUTO: 0.53 K/UL
MONOCYTES NFR BLD AUTO: 11.8 %
NEUTROPHILS # BLD AUTO: 2.41 K/UL
NEUTROPHILS NFR BLD AUTO: 53.7 %
NITRITE URINE: NEGATIVE
NONHDLC SERPL-MCNC: 85 MG/DL
PH URINE: 6
PLATELET # BLD AUTO: 168 K/UL
POTASSIUM SERPL-SCNC: 4.2 MMOL/L
PROT SERPL-MCNC: 6.1 G/DL
PROTEIN URINE: NEGATIVE MG/DL
PSA FREE FLD-MCNC: 36 %
PSA FREE SERPL-MCNC: 0.32 NG/ML
PSA SERPL-MCNC: 0.87 NG/ML
RBC # BLD: 5 M/UL
RBC # FLD: 16.5 %
SODIUM SERPL-SCNC: 139 MMOL/L
SPECIFIC GRAVITY URINE: 1.01
TRIGL SERPL-MCNC: 83 MG/DL
TSH SERPL-ACNC: 2.78 UIU/ML
UROBILINOGEN URINE: 0.2 MG/DL
WBC # FLD AUTO: 4.49 K/UL

## 2025-08-21 ENCOUNTER — RX RENEWAL (OUTPATIENT)
Age: 85
End: 2025-08-21

## 2025-09-02 ENCOUNTER — OUTPATIENT (OUTPATIENT)
Dept: OUTPATIENT SERVICES | Facility: HOSPITAL | Age: 85
LOS: 1 days | End: 2025-09-02
Payer: MEDICARE

## 2025-09-02 ENCOUNTER — TRANSCRIPTION ENCOUNTER (OUTPATIENT)
Age: 85
End: 2025-09-02

## 2025-09-02 ENCOUNTER — RESULT REVIEW (OUTPATIENT)
Age: 85
End: 2025-09-02

## 2025-09-02 ENCOUNTER — APPOINTMENT (OUTPATIENT)
Dept: GASTROENTEROLOGY | Facility: HOSPITAL | Age: 85
End: 2025-09-02

## 2025-09-02 VITALS
OXYGEN SATURATION: 98 % | DIASTOLIC BLOOD PRESSURE: 60 MMHG | SYSTOLIC BLOOD PRESSURE: 122 MMHG | HEIGHT: 68 IN | HEART RATE: 55 BPM | WEIGHT: 149.91 LBS | RESPIRATION RATE: 16 BRPM | TEMPERATURE: 97 F

## 2025-09-02 VITALS
RESPIRATION RATE: 20 BRPM | HEART RATE: 68 BPM | SYSTOLIC BLOOD PRESSURE: 116 MMHG | OXYGEN SATURATION: 100 % | DIASTOLIC BLOOD PRESSURE: 57 MMHG

## 2025-09-02 DIAGNOSIS — Z98.890 OTHER SPECIFIED POSTPROCEDURAL STATES: Chronic | ICD-10-CM

## 2025-09-02 DIAGNOSIS — Z95.1 PRESENCE OF AORTOCORONARY BYPASS GRAFT: Chronic | ICD-10-CM

## 2025-09-02 DIAGNOSIS — C18.2 MALIGNANT NEOPLASM OF ASCENDING COLON: ICD-10-CM

## 2025-09-02 DIAGNOSIS — Z95.5 PRESENCE OF CORONARY ANGIOPLASTY IMPLANT AND GRAFT: Chronic | ICD-10-CM

## 2025-09-02 LAB — GLUCOSE BLDC GLUCOMTR-MCNC: 158 MG/DL — HIGH (ref 70–99)

## 2025-09-02 PROCEDURE — 88342 IMHCHEM/IMCYTCHM 1ST ANTB: CPT

## 2025-09-02 PROCEDURE — C1889: CPT

## 2025-09-02 PROCEDURE — 45385 COLONOSCOPY W/LESION REMOVAL: CPT

## 2025-09-02 PROCEDURE — 88305 TISSUE EXAM BY PATHOLOGIST: CPT

## 2025-09-02 PROCEDURE — 88341 IMHCHEM/IMCYTCHM EA ADD ANTB: CPT | Mod: 26

## 2025-09-02 PROCEDURE — 45381 COLONOSCOPY SUBMUCOUS NJX: CPT | Mod: PT

## 2025-09-02 PROCEDURE — 88341 IMHCHEM/IMCYTCHM EA ADD ANTB: CPT

## 2025-09-02 PROCEDURE — 88305 TISSUE EXAM BY PATHOLOGIST: CPT | Mod: 26

## 2025-09-02 PROCEDURE — 43239 EGD BIOPSY SINGLE/MULTIPLE: CPT | Mod: 59

## 2025-09-02 PROCEDURE — 88342 IMHCHEM/IMCYTCHM 1ST ANTB: CPT | Mod: 26

## 2025-09-02 PROCEDURE — 43239 EGD BIOPSY SINGLE/MULTIPLE: CPT | Mod: XS

## 2025-09-02 PROCEDURE — 43238 EGD US FINE NEEDLE BX/ASPIR: CPT

## 2025-09-02 PROCEDURE — 45390 COLONOSCOPY W/RESECTION: CPT | Mod: 59

## 2025-09-02 PROCEDURE — 82962 GLUCOSE BLOOD TEST: CPT

## 2025-09-02 PROCEDURE — 45385 COLONOSCOPY W/LESION REMOVAL: CPT | Mod: PT

## 2025-09-02 PROCEDURE — 0753T DGTZ GLS MCRSCP SLD LEVEL IV: CPT

## 2025-09-02 DEVICE — NET RETRV ROT ROTH 2.5MMX230CM: Type: IMPLANTABLE DEVICE | Status: FUNCTIONAL

## 2025-09-02 DEVICE — CLIP REPOSITIONABLE HEMOSTASIS 22MMX235CM: Type: IMPLANTABLE DEVICE | Status: FUNCTIONAL

## 2025-09-02 RX ORDER — CLOPIDOGREL BISULFATE 75 MG/1
1 TABLET, FILM COATED ORAL
Refills: 0 | DISCHARGE

## 2025-09-10 LAB — SURGICAL PATHOLOGY STUDY: SIGNIFICANT CHANGE UP

## 2025-09-24 PROBLEM — D21.4 GIST, NON-MALIGNANT: Status: ACTIVE | Noted: 2025-09-24

## (undated) DEVICE — Device

## (undated) DEVICE — TROCAR COVIDIEN VERSAONE FIXATION CANNULA 5MM

## (undated) DEVICE — SUT POLYSORB 3-0 30" V-20 UNDYED

## (undated) DEVICE — ENDOCUFF VISION SZ 2 LG GRN

## (undated) DEVICE — BIOPSY FORCEP RADIAL JAW 4 STANDARD WITH NEEDLE

## (undated) DEVICE — SPECIMEN CONTAINER 100ML

## (undated) DEVICE — RADIAL JAW 4 LG CAPACITY WITH NDL

## (undated) DEVICE — TUBING IV SET GRAVITY 3Y 100" MACRO

## (undated) DEVICE — POLY TRAP ETRAP

## (undated) DEVICE — ELCTR CORD FOOTSWITCH 1PLR LAPSCP 10FT

## (undated) DEVICE — POSITIONER FOAM EGG CRATE ULNAR 2PCS (PINK)

## (undated) DEVICE — DRSG OPSITE 13.75 X 4"

## (undated) DEVICE — SYR ALLIANCE II INFLATION 60ML

## (undated) DEVICE — CLAMP BX HOT RAD JAW 3

## (undated) DEVICE — FORCEP RADIAL JAW 4 JUMBO 2.8MM 3.2MM 240CM ORANGE DISP

## (undated) DEVICE — ENDOCUFF VISION SZ 3 SM PRPL

## (undated) DEVICE — CATH ELECHMSTAT  INJ 7FR 210CM

## (undated) DEVICE — SUCTION YANKAUER NO CONTROL VENT

## (undated) DEVICE — SHEARS COVIDIEN ENDO SHEAR 5MM X 31CM W UNIPOLAR CAUTERY

## (undated) DEVICE — TUBING SUCTION 20FT

## (undated) DEVICE — ATTACHMENT DISTAL 4X13.4MM

## (undated) DEVICE — DRSG STERISTRIPS 0.5 X 4"

## (undated) DEVICE — GLV 7.5 PROTEXIS (WHITE)

## (undated) DEVICE — SYR LUER SLIP TIP 30CC

## (undated) DEVICE — CATH IV SAFE BC 22G X 1" (BLUE)

## (undated) DEVICE — RETRIEVER ROTH NET PLATINUM-UNIVERSAL

## (undated) DEVICE — TRAP QUICK CATCH  SINGL CHAMBER

## (undated) DEVICE — BRUSH COLONOSCOPY CYTOLOGY

## (undated) DEVICE — D HELP - CLEARVIEW CLEARIFY SYSTEM

## (undated) DEVICE — SENSOR O2 FINGER ADULT

## (undated) DEVICE — DRAPE 1/2 SHEET 40X57"

## (undated) DEVICE — LIGASURE MARYLAND 37CM

## (undated) DEVICE — FOLEY HOLDER STATLOCK 2 WAY ADULT

## (undated) DEVICE — TROCAR APPLIED MEDICAL KII BALLOON BLUNT TIP 12MM X 100MM

## (undated) DEVICE — SUT POLYSORB 0 36" GU-46

## (undated) DEVICE — SOL IRR POUR H2O 500ML

## (undated) DEVICE — PACK IV START WITH CHG

## (undated) DEVICE — STAPLER SKIN VISI-STAT 35 WIDE

## (undated) DEVICE — MASK O2 NON REBREATH 3IN1 ADULT

## (undated) DEVICE — SUT POLYSORB 0 18" MP UNDYED

## (undated) DEVICE — TRAP SPECIMEN SPUTUM 40CC

## (undated) DEVICE — BALLOON US ENDO

## (undated) DEVICE — TROCAR COVIDIEN VERSASTEP PLUS 12MM STANDARD

## (undated) DEVICE — SET IV PUMP BLOOD 1VALVE 180FILTER NON-DEHP

## (undated) DEVICE — STAPLER COVIDIEN ENDO GIA SHORT HANDLE

## (undated) DEVICE — SUT HEWSON RETRIEVER

## (undated) DEVICE — BITE BLOCK MAXI RUBBER STAMP

## (undated) DEVICE — DRSG TELFA 4 X 14

## (undated) DEVICE — CATH IV SAFE BC 20G X 1.16" (PINK)

## (undated) DEVICE — DRAPE GENERAL ENDOSCOPY

## (undated) DEVICE — TUBING STRYKEFLOW II SUCTION / IRRIGATOR

## (undated) DEVICE — IRRIGATOR BIO SHIELD

## (undated) DEVICE — LIFTER SYR EVERLIFT AGENT SUBMUCOSAL 10ML BLU

## (undated) DEVICE — SOL IRR POUR H2O 250ML

## (undated) DEVICE — TROCAR COVIDIEN VERSAPORT BLADELESS OPTICAL 5MM STANDARD

## (undated) DEVICE — PREP CHLORAPREP HI-LITE ORANGE 26ML

## (undated) DEVICE — TUBE O2 SUPL CRUSH RESIS CONN SOUTHSIDE ONLY

## (undated) DEVICE — SOL INJ NS 0.9% 500ML 2 PORT

## (undated) DEVICE — TUBING CANNULA SALTER LABS NASAL ADULT 7FT

## (undated) DEVICE — FORCEP RADIAL JAW 4 W NDL 2.4MM 2.8MM 240CM ORANGE DISP

## (undated) DEVICE — SNARE POLYP SENS 27MM 240CM

## (undated) DEVICE — FORMALIN CUPS 10% BUFFERED

## (undated) DEVICE — ELCTR GROUNDING PAD ADULT COVIDIEN

## (undated) DEVICE — TUBING IV SET SECOND 34" W/O LOK-BLUNT

## (undated) DEVICE — LIGASURE IMPACT

## (undated) DEVICE — DRAPE IOBAN 23" X 23"

## (undated) DEVICE — PACK MAJOR ABDOMINAL SUPINE

## (undated) DEVICE — PLV/PSP-ESU 1111719159: Type: DURABLE MEDICAL EQUIPMENT

## (undated) DEVICE — DRSG TAPE UMBILICAL COTTON 2" X 30 X 1/8"

## (undated) DEVICE — DRSG TEGADERM 6"X8"

## (undated) DEVICE — SYR LUER SLIP TIP 50CC

## (undated) DEVICE — SYR LUER LOK 50CC

## (undated) DEVICE — TUBE RECTAL 24FR

## (undated) DEVICE — STERIS DEFENDO 3-PIECE KIT (AIR/WATER, SUCTION & BIOPSY VALVES)

## (undated) DEVICE — CANISTER SUCTION 1200CC 10/SL

## (undated) DEVICE — WARMING BLANKET LOWER ADULT

## (undated) DEVICE — NDL ACQUIRE EUS FNB 22GA STRL

## (undated) DEVICE — CATH ELCTR GLIDE PRB 7FR

## (undated) DEVICE — BITE BLOCK ADULT 20 X 27MM (GREEN)

## (undated) DEVICE — VENODYNE/SCD SLEEVE CALF MEDIUM

## (undated) DEVICE — DRSG CURITY GAUZE SPONGE 4 X 4" 12-PLY

## (undated) DEVICE — NDL INJ SCLERO INTERJECT 23G

## (undated) DEVICE — SUT SOFSILK 3-0 18" V-20 (POP-OFF)

## (undated) DEVICE — TUBING INSUFFLATION LAP FILTER 10FT

## (undated) DEVICE — VALVE BIOPSY

## (undated) DEVICE — SYR LUER LOK 30CC

## (undated) DEVICE — STAPLER COVIDIEN ENDO GIA STANDARD HANDLE

## (undated) DEVICE — SUT MAXON 1 60" T-60

## (undated) DEVICE — APPLICATOR VISTASEAL LAP DUAL 35CM RIGID

## (undated) DEVICE — TUBING SUCTION CONN 6FT STERILE

## (undated) DEVICE — MASK OXYGEN PANORAMIC

## (undated) DEVICE — DRAPE TOWEL BLUE 17" X 24"

## (undated) DEVICE — VENODYNE/SCD SLEEVE CALF BARIATRIC

## (undated) DEVICE — SUT POLYSORB 1 60" TIES

## (undated) DEVICE — SUT MAXON 1 36" GS-24

## (undated) DEVICE — GOWN TRIMAX LG

## (undated) DEVICE — SUT VICRYL 0 18" ENDOLOOP LIGATURE

## (undated) DEVICE — BRUSH CYTO ENDO

## (undated) DEVICE — CAP TRANSPARENT DISTAL ATTACHMENT DISP

## (undated) DEVICE — SNARE LRG

## (undated) DEVICE — MARKER ENDO SPOT EX

## (undated) DEVICE — SENSOR O2 FINGER XL ADULT 24/BX 6BX/CA

## (undated) DEVICE — TUBING IV SET SECONDARY 34"

## (undated) DEVICE — SYR IV POSIFLUSH NS 3ML 30/TY

## (undated) DEVICE — FORCEP RADIAL JAW 4 W NDL 2.2MM 2.8MM 160CM YELLOW DISP

## (undated) DEVICE — FOLEY TRAY 16FR 5CC LTX UMETER CLOSED

## (undated) DEVICE — SOL IRR POUR NS 0.9% 500ML

## (undated) DEVICE — BLADE SCALPEL SAFETYLOCK #15

## (undated) DEVICE — DRAIN PENROSE .25" X 18" LATEX

## (undated) DEVICE — VENODYNE/SCD SLEEVE CALF LARGE

## (undated) DEVICE — PACK COLON BUNDLE

## (undated) DEVICE — WARMING BLANKET UPPER ADULT

## (undated) DEVICE — ELCTR BOVIE PENCIL SMOKE EVACUATION

## (undated) DEVICE — SOL IRR NS 0.9% 250ML

## (undated) DEVICE — INSUFFLATION NDL COVIDIEN STEP 14G FOR STEP/VERSASTEP

## (undated) DEVICE — SUCTION YANKAUER TAPERED BULBOUS NO VENT